# Patient Record
Sex: FEMALE | Race: WHITE | Employment: OTHER | URBAN - METROPOLITAN AREA
[De-identification: names, ages, dates, MRNs, and addresses within clinical notes are randomized per-mention and may not be internally consistent; named-entity substitution may affect disease eponyms.]

---

## 2018-04-12 ENCOUNTER — HOSPITAL ENCOUNTER (EMERGENCY)
Facility: CLINIC | Age: 83
Discharge: HOME | End: 2018-04-12
Attending: EMERGENCY MEDICINE
Payer: COMMERCIAL

## 2018-04-12 VITALS
TEMPERATURE: 97 F | DIASTOLIC BLOOD PRESSURE: 70 MMHG | RESPIRATION RATE: 20 BRPM | HEART RATE: 104 BPM | WEIGHT: 120 LBS | BODY MASS INDEX: 24.19 KG/M2 | SYSTOLIC BLOOD PRESSURE: 110 MMHG | HEIGHT: 59 IN | OXYGEN SATURATION: 95 %

## 2018-04-12 DIAGNOSIS — N76.0 VAGINITIS AND VULVOVAGINITIS: Primary | ICD-10-CM

## 2018-04-12 PROCEDURE — 99282 EMERGENCY DEPT VISIT SF MDM: CPT

## 2018-04-12 RX ORDER — CLOTRIMAZOLE AND BETAMETHASONE DIPROPIONATE 10; .64 MG/G; MG/G
CREAM TOPICAL
Qty: 1 G | Refills: 0 | Status: ON HOLD | OUTPATIENT
Start: 2018-04-12 | End: 2021-12-20

## 2018-04-12 ASSESSMENT — ENCOUNTER SYMPTOMS
EYES NEGATIVE: 1
GASTROINTESTINAL NEGATIVE: 1
RESPIRATORY NEGATIVE: 1

## 2018-04-12 ASSESSMENT — PAIN DESCRIPTION - PAIN TYPE
TYPE: ACUTE PAIN
TYPE: ACUTE PAIN

## 2018-04-12 ASSESSMENT — PAIN DESCRIPTION - FREQUENCY: FREQUENCY: CONTINUOUS

## 2018-04-12 ASSESSMENT — PAIN - FUNCTIONAL ASSESSMENT: PAIN_FUNCTIONAL_ASSESSMENT: 0-10

## 2018-04-12 ASSESSMENT — PAIN DESCRIPTION - DESCRIPTORS
DESCRIPTORS: BURNING;ITCHING
DESCRIPTORS: BURNING

## 2018-04-12 ASSESSMENT — PAIN DESCRIPTION - LOCATION
LOCATION: VAGINA;LABIA
LOCATION: VAGINA;LABIA

## 2018-04-12 ASSESSMENT — PAIN SCALES - GENERAL
PAINLEVEL_OUTOF10: 10
PAINLEVEL_OUTOF10: 10

## 2018-04-16 ENCOUNTER — OFFICE VISIT (OUTPATIENT)
Dept: OBGYN CLINIC | Age: 83
End: 2018-04-16
Payer: COMMERCIAL

## 2018-04-16 VITALS
HEIGHT: 59 IN | SYSTOLIC BLOOD PRESSURE: 116 MMHG | HEART RATE: 88 BPM | DIASTOLIC BLOOD PRESSURE: 72 MMHG | BODY MASS INDEX: 23.59 KG/M2 | WEIGHT: 117 LBS

## 2018-04-16 DIAGNOSIS — N89.8 VAGINAL ITCHING: Primary | ICD-10-CM

## 2018-04-16 PROBLEM — Z90.710 HISTORY OF HYSTERECTOMY: Status: ACTIVE | Noted: 2018-04-16

## 2018-04-16 PROBLEM — Z95.0 CARDIAC RESYNCHRONIZATION THERAPY PACEMAKER (CRT-P) IN PLACE: Status: ACTIVE | Noted: 2018-04-16

## 2018-04-16 PROCEDURE — 99201 PR OFFICE OUTPATIENT NEW 10 MINUTES: CPT | Performed by: OBSTETRICS & GYNECOLOGY

## 2018-04-16 RX ORDER — APIXABAN 2.5 MG/1
2.5 TABLET, FILM COATED ORAL 2 TIMES DAILY
Status: ON HOLD | COMMUNITY
Start: 2018-03-17 | End: 2021-12-20

## 2018-04-16 RX ORDER — SPIRONOLACTONE 25 MG/1
25 TABLET ORAL DAILY
COMMUNITY

## 2019-07-22 ENCOUNTER — HOSPITAL ENCOUNTER (OUTPATIENT)
Age: 84
Setting detail: SPECIMEN
Discharge: HOME | End: 2019-07-22
Payer: COMMERCIAL

## 2019-07-22 LAB
ANION GAP SERPL CALCULATED.3IONS-SCNC: 12 MMOL/L (ref 9–17)
BUN BLDV-MCNC: 14 MG/DL (ref 8–23)
BUN/CREAT BLD: ABNORMAL (ref 9–20)
CALCIUM SERPL-MCNC: 8.5 MG/DL (ref 8.6–10.4)
CHLORIDE BLD-SCNC: 102 MMOL/L (ref 98–107)
CO2: 31 MMOL/L (ref 20–31)
CREAT SERPL-MCNC: 0.94 MG/DL (ref 0.5–0.9)
GFR AFRICAN AMERICAN: >60 ML/MIN
GFR NON-AFRICAN AMERICAN: 57 ML/MIN
GFR SERPL CREATININE-BSD FRML MDRD: ABNORMAL ML/MIN/{1.73_M2}
GFR SERPL CREATININE-BSD FRML MDRD: ABNORMAL ML/MIN/{1.73_M2}
GLUCOSE BLD-MCNC: 83 MG/DL (ref 70–99)
HCT VFR BLD CALC: 36.1 % (ref 36.3–47.1)
HEMOGLOBIN: 11.1 G/DL (ref 11.9–15.1)
MCH RBC QN AUTO: 30.4 PG (ref 25.2–33.5)
MCHC RBC AUTO-ENTMCNC: 30.7 G/DL (ref 28.4–34.8)
MCV RBC AUTO: 98.9 FL (ref 82.6–102.9)
NRBC AUTOMATED: 0 PER 100 WBC
PDW BLD-RTO: 14.1 % (ref 11.8–14.4)
PLATELET # BLD: 299 K/UL (ref 138–453)
PMV BLD AUTO: 10.4 FL (ref 8.1–13.5)
POTASSIUM SERPL-SCNC: 4.1 MMOL/L (ref 3.7–5.3)
RBC # BLD: 3.65 M/UL (ref 3.95–5.11)
SODIUM BLD-SCNC: 145 MMOL/L (ref 135–144)
WBC # BLD: 8.1 K/UL (ref 3.5–11.3)

## 2019-07-22 PROCEDURE — P9603 ONE-WAY ALLOW PRORATED MILES: HCPCS

## 2019-07-22 PROCEDURE — 80048 BASIC METABOLIC PNL TOTAL CA: CPT

## 2019-07-22 PROCEDURE — 85027 COMPLETE CBC AUTOMATED: CPT

## 2019-07-22 PROCEDURE — 36415 COLL VENOUS BLD VENIPUNCTURE: CPT

## 2019-07-29 ENCOUNTER — HOSPITAL ENCOUNTER (OUTPATIENT)
Age: 84
Setting detail: SPECIMEN
Discharge: HOME | End: 2019-07-29
Payer: COMMERCIAL

## 2019-07-29 LAB
ANION GAP SERPL CALCULATED.3IONS-SCNC: 11 MMOL/L (ref 9–17)
BUN BLDV-MCNC: 23 MG/DL (ref 8–23)
BUN/CREAT BLD: ABNORMAL (ref 9–20)
CALCIUM SERPL-MCNC: 8.9 MG/DL (ref 8.6–10.4)
CHLORIDE BLD-SCNC: 103 MMOL/L (ref 98–107)
CO2: 31 MMOL/L (ref 20–31)
CREAT SERPL-MCNC: 1.09 MG/DL (ref 0.5–0.9)
GFR AFRICAN AMERICAN: 58 ML/MIN
GFR NON-AFRICAN AMERICAN: 48 ML/MIN
GFR SERPL CREATININE-BSD FRML MDRD: ABNORMAL ML/MIN/{1.73_M2}
GFR SERPL CREATININE-BSD FRML MDRD: ABNORMAL ML/MIN/{1.73_M2}
GLUCOSE BLD-MCNC: 86 MG/DL (ref 70–99)
HCT VFR BLD CALC: 35 % (ref 36.3–47.1)
HEMOGLOBIN: 10.8 G/DL (ref 11.9–15.1)
MCH RBC QN AUTO: 30.7 PG (ref 25.2–33.5)
MCHC RBC AUTO-ENTMCNC: 30.9 G/DL (ref 28.4–34.8)
MCV RBC AUTO: 99.4 FL (ref 82.6–102.9)
NRBC AUTOMATED: 0 PER 100 WBC
PDW BLD-RTO: 14.1 % (ref 11.8–14.4)
PLATELET # BLD: 378 K/UL (ref 138–453)
PMV BLD AUTO: 10.1 FL (ref 8.1–13.5)
POTASSIUM SERPL-SCNC: 5.2 MMOL/L (ref 3.7–5.3)
RBC # BLD: 3.52 M/UL (ref 3.95–5.11)
SODIUM BLD-SCNC: 145 MMOL/L (ref 135–144)
WBC # BLD: 8.9 K/UL (ref 3.5–11.3)

## 2019-07-29 PROCEDURE — 36415 COLL VENOUS BLD VENIPUNCTURE: CPT

## 2019-07-29 PROCEDURE — P9603 ONE-WAY ALLOW PRORATED MILES: HCPCS

## 2019-07-29 PROCEDURE — 85027 COMPLETE CBC AUTOMATED: CPT

## 2019-07-29 PROCEDURE — 80048 BASIC METABOLIC PNL TOTAL CA: CPT

## 2019-08-05 ENCOUNTER — HOSPITAL ENCOUNTER (OUTPATIENT)
Age: 84
Setting detail: SPECIMEN
Discharge: HOME | End: 2019-08-05
Payer: COMMERCIAL

## 2019-08-05 LAB
ANION GAP SERPL CALCULATED.3IONS-SCNC: 14 MMOL/L (ref 9–17)
BUN BLDV-MCNC: 27 MG/DL (ref 8–23)
BUN/CREAT BLD: ABNORMAL (ref 9–20)
CALCIUM SERPL-MCNC: 9 MG/DL (ref 8.6–10.4)
CHLORIDE BLD-SCNC: 101 MMOL/L (ref 98–107)
CO2: 28 MMOL/L (ref 20–31)
CREAT SERPL-MCNC: 1.03 MG/DL (ref 0.5–0.9)
GFR AFRICAN AMERICAN: >60 ML/MIN
GFR NON-AFRICAN AMERICAN: 51 ML/MIN
GFR SERPL CREATININE-BSD FRML MDRD: ABNORMAL ML/MIN/{1.73_M2}
GFR SERPL CREATININE-BSD FRML MDRD: ABNORMAL ML/MIN/{1.73_M2}
GLUCOSE BLD-MCNC: 70 MG/DL (ref 70–99)
HCT VFR BLD CALC: 39.3 % (ref 36.3–47.1)
HEMOGLOBIN: 11.9 G/DL (ref 11.9–15.1)
MCH RBC QN AUTO: 30 PG (ref 25.2–33.5)
MCHC RBC AUTO-ENTMCNC: 30.3 G/DL (ref 28.4–34.8)
MCV RBC AUTO: 99 FL (ref 82.6–102.9)
NRBC AUTOMATED: 0 PER 100 WBC
PDW BLD-RTO: 13.2 % (ref 11.8–14.4)
PLATELET # BLD: 482 K/UL (ref 138–453)
PMV BLD AUTO: 10.4 FL (ref 8.1–13.5)
POTASSIUM SERPL-SCNC: 3.9 MMOL/L (ref 3.7–5.3)
RBC # BLD: 3.97 M/UL (ref 3.95–5.11)
SODIUM BLD-SCNC: 143 MMOL/L (ref 135–144)
WBC # BLD: 7.9 K/UL (ref 3.5–11.3)

## 2019-08-05 PROCEDURE — P9603 ONE-WAY ALLOW PRORATED MILES: HCPCS

## 2019-08-05 PROCEDURE — 36415 COLL VENOUS BLD VENIPUNCTURE: CPT

## 2019-08-05 PROCEDURE — 80048 BASIC METABOLIC PNL TOTAL CA: CPT

## 2019-08-05 PROCEDURE — 85027 COMPLETE CBC AUTOMATED: CPT

## 2019-08-07 ENCOUNTER — HOSPITAL ENCOUNTER (OUTPATIENT)
Age: 84
Setting detail: SPECIMEN
Discharge: HOME | End: 2019-08-07
Payer: COMMERCIAL

## 2019-08-07 PROCEDURE — 87493 C DIFF AMPLIFIED PROBE: CPT

## 2019-08-08 LAB
C DIFFICILE TOXINS, PCR: NORMAL
SPECIMEN DESCRIPTION: NORMAL

## 2019-08-20 ENCOUNTER — APPOINTMENT (OUTPATIENT)
Dept: GENERAL RADIOLOGY | Facility: CLINIC | Age: 84
End: 2019-08-20
Payer: COMMERCIAL

## 2019-08-20 ENCOUNTER — HOSPITAL ENCOUNTER (EMERGENCY)
Facility: CLINIC | Age: 84
Discharge: HOME | End: 2019-08-20
Attending: EMERGENCY MEDICINE
Payer: COMMERCIAL

## 2019-08-20 VITALS
WEIGHT: 116 LBS | BODY MASS INDEX: 19.81 KG/M2 | RESPIRATION RATE: 15 BRPM | DIASTOLIC BLOOD PRESSURE: 51 MMHG | TEMPERATURE: 97.7 F | SYSTOLIC BLOOD PRESSURE: 95 MMHG | OXYGEN SATURATION: 94 % | HEIGHT: 64 IN | HEART RATE: 75 BPM

## 2019-08-20 DIAGNOSIS — M25.551 RIGHT HIP PAIN: Primary | ICD-10-CM

## 2019-08-20 PROCEDURE — 73552 X-RAY EXAM OF FEMUR 2/>: CPT

## 2019-08-20 PROCEDURE — 73502 X-RAY EXAM HIP UNI 2-3 VIEWS: CPT

## 2019-08-20 PROCEDURE — 99283 EMERGENCY DEPT VISIT LOW MDM: CPT

## 2019-08-20 RX ORDER — OXYCODONE HYDROCHLORIDE AND ACETAMINOPHEN 5; 325 MG/1; MG/1
1 TABLET ORAL EVERY 8 HOURS PRN
COMMUNITY

## 2019-08-20 RX ORDER — FUROSEMIDE 20 MG/1
20 TABLET ORAL 2 TIMES DAILY
Status: ON HOLD | COMMUNITY
End: 2021-12-20

## 2019-08-20 ASSESSMENT — PAIN DESCRIPTION - PAIN TYPE
TYPE: CHRONIC PAIN
TYPE: CHRONIC PAIN

## 2019-08-20 ASSESSMENT — PAIN DESCRIPTION - LOCATION
LOCATION: HIP;LEG
LOCATION: BACK;PELVIS;HIP

## 2019-08-20 ASSESSMENT — PAIN DESCRIPTION - ORIENTATION
ORIENTATION: RIGHT
ORIENTATION: RIGHT

## 2019-08-20 ASSESSMENT — PAIN DESCRIPTION - DESCRIPTORS
DESCRIPTORS: ACHING
DESCRIPTORS: ACHING;BURNING

## 2019-08-20 ASSESSMENT — PAIN SCALES - GENERAL
PAINLEVEL_OUTOF10: 9
PAINLEVEL_OUTOF10: 10

## 2019-08-20 ASSESSMENT — PAIN DESCRIPTION - FREQUENCY: FREQUENCY: CONTINUOUS

## 2019-08-20 NOTE — ED TRIAGE NOTES
PT fell 1 month ago , pt was seen at [facility] pt states they did xray and ultrasound . Pt states it still hurts and she would like an x ray. Pt c/o right hip pain and right leg pain .

## 2019-08-21 NOTE — ED PROVIDER NOTES
likely posttraumatic in etiology. There is mild osteoarthritis at the right knee joint. There is a stable sclerotic focus within the distal aspect of the right femoral metaphysis, most likely an enchondroma or bone infarct. No soft tissue abnormality or radiopaque foreign body is evident. 1. No acute radiographic abnormality of the right hip or osseous pelvis. However, if there is strong suspicion for an acute right hip fracture, consider further characterization with a follow-up right hip MRI. 2. No acute abnormality of the right femur. There is stable periosteal thickening involving the midshaft of the right femur, which likely reflects a chronic posttraumatic deformity. There is a stable sclerotic focus within the distal right femoral metaphysis, likely a benign enchondroma or bone infarct. Xr Hip 2-3 Vw W Pelvis Right    Result Date: 8/20/2019  EXAMINATION: ONE XRAY VIEW OF THE PELVIS AND TWO XRAY VIEWS RIGHT HIP; 4 XRAY VIEWS OF THE RIGHT FEMUR 8/20/2019 7:10 pm COMPARISON: 02/09/2008 HISTORY: ORDERING SYSTEM PROVIDED HISTORY: pain TECHNOLOGIST PROVIDED HISTORY: pain Reason for Exam: Pt. States she fell 1 month ago and c/o of right hip pain since fall. Acuity: Acute Type of Exam: Initial; ORDERING SYSTEM PROVIDED HISTORY: right TECHNOLOGIST PROVIDED HISTORY: right Reason for Exam: Pt. States she fell 1 month ago and c/o of right hip pain since fall. Acuity: Acute Type of Exam: Initial FINDINGS: There is a single frontal view of the pelvis as well as frontal and frog-leg lateral views of the right hip. There is no radiographic evidence of an acute fracture or dislocation of the right hip or osseous pelvis. There is mild symmetric bilateral hip osteoarthritis. The pelvic ring appears intact. Sacrum and SI joints are unremarkable. No destructive osseous lesion is seen. Enthesopathic changes are evident. Scattered phleboliths overlie the pelvis. Four views of the right femur were performed.
Minutes per session: Not on file    Stress: Not on file   Relationships    Social connections:     Talks on phone: Not on file     Gets together: Not on file     Attends [employer] service: Not on file     Active member of club or organization: Not on file     Attends meetings of clubs or organizations: Not on file     Relationship status: Not on file    Intimate partner violence:     Fear of current or ex partner: Not on file     Emotionally abused: Not on file     Physically abused: Not on file     Forced sexual activity: Not on file   Other Topics Concern    Not on file   Social History Narrative    Not on file       SCREENINGS             PHYSICAL EXAM    (up to 7 for level 4, 8 or more for level 5)     ED Triage Vitals   BP Temp Temp Source Pulse Resp SpO2 Height Weight   08/20/19 1857 08/20/19 1857 08/20/19 1857 08/20/19 1857 08/20/19 1857 08/20/19 1901 08/20/19 1857 08/20/19 1857   (!) 95/51 97.7 °F (36.5 °C) Oral 75 15 94 % 5' 4\" (1.626 m) 116 lb (52.6 kg)       Physical Exam   Constitutional: She is oriented to person, place, and time. She appears well-developed and well-nourished. HENT:   Mouth/Throat: Oropharynx is clear and moist.   Eyes: Conjunctivae are normal. Right eye exhibits no discharge. Left eye exhibits no discharge. Neck: No tracheal deviation present. Cardiovascular: Normal rate, regular rhythm, normal heart sounds and intact distal pulses. No murmur heard. Pulmonary/Chest: Effort normal and breath sounds normal. No respiratory distress. She has no wheezes. Abdominal: Soft. She exhibits no distension. There is no tenderness. Musculoskeletal: Normal range of motion. She exhibits no edema, tenderness or deformity. There are no range of motion deficits in the lower extremities, there is no point tenderness over the right hip or greater trochanter, no tenderness at the right knee or right ankle. There is no leg length discrepancies or edema of the lower extremities.   There

## 2019-09-16 ENCOUNTER — HOSPITAL ENCOUNTER (EMERGENCY)
Facility: CLINIC | Age: 84
Discharge: HOME | End: 2019-09-16
Attending: EMERGENCY MEDICINE
Payer: COMMERCIAL

## 2019-09-16 VITALS
DIASTOLIC BLOOD PRESSURE: 68 MMHG | HEART RATE: 86 BPM | SYSTOLIC BLOOD PRESSURE: 118 MMHG | WEIGHT: 118 LBS | RESPIRATION RATE: 16 BRPM | HEIGHT: 59 IN | TEMPERATURE: 97.9 F | BODY MASS INDEX: 23.79 KG/M2 | OXYGEN SATURATION: 94 %

## 2019-09-16 DIAGNOSIS — B02.9 HERPES ZOSTER WITHOUT COMPLICATION: Primary | ICD-10-CM

## 2019-09-16 PROCEDURE — 99282 EMERGENCY DEPT VISIT SF MDM: CPT

## 2019-09-16 PROCEDURE — 6370000000 HC RX 637 (ALT 250 FOR IP): Performed by: EMERGENCY MEDICINE

## 2019-09-16 RX ORDER — ACYCLOVIR 800 MG/1
800 TABLET ORAL
Qty: 35 TABLET | Refills: 0 | Status: SHIPPED | OUTPATIENT
Start: 2019-09-16 | End: 2019-09-23

## 2019-09-16 RX ORDER — METOPROLOL SUCCINATE 25 MG/1
25 TABLET, EXTENDED RELEASE ORAL DAILY
Status: ON HOLD | COMMUNITY
End: 2021-12-20

## 2019-09-16 RX ORDER — HYDROCODONE BITARTRATE AND ACETAMINOPHEN 5; 325 MG/1; MG/1
1 TABLET ORAL ONCE
Status: COMPLETED | OUTPATIENT
Start: 2019-09-16 | End: 2019-09-16

## 2019-09-16 RX ORDER — GABAPENTIN 300 MG/1
300 CAPSULE ORAL 3 TIMES DAILY
Qty: 30 CAPSULE | Refills: 0 | Status: ON HOLD | OUTPATIENT
Start: 2019-09-16 | End: 2021-10-04

## 2019-09-16 RX ADMIN — HYDROCODONE BITARTRATE AND ACETAMINOPHEN 1 TABLET: 5; 325 TABLET ORAL at 10:37

## 2019-09-16 ASSESSMENT — PAIN DESCRIPTION - ORIENTATION: ORIENTATION: RIGHT

## 2019-09-16 ASSESSMENT — PAIN DESCRIPTION - PAIN TYPE: TYPE: ACUTE PAIN

## 2019-09-16 ASSESSMENT — PAIN SCALES - GENERAL
PAINLEVEL_OUTOF10: 10
PAINLEVEL_OUTOF10: 10

## 2019-09-16 ASSESSMENT — PAIN DESCRIPTION - LOCATION: LOCATION: RIB CAGE

## 2021-10-03 ENCOUNTER — HOSPITAL ENCOUNTER (OUTPATIENT)
Age: 86
Setting detail: OBSERVATION
Discharge: HOME | End: 2021-10-05
Attending: EMERGENCY MEDICINE | Admitting: STUDENT IN AN ORGANIZED HEALTH CARE EDUCATION/TRAINING PROGRAM
Payer: COMMERCIAL

## 2021-10-03 ENCOUNTER — APPOINTMENT (OUTPATIENT)
Dept: CT IMAGING | Facility: CLINIC | Age: 86
End: 2021-10-03
Payer: COMMERCIAL

## 2021-10-03 DIAGNOSIS — N81.6 RECTOCELE, FEMALE: ICD-10-CM

## 2021-10-03 DIAGNOSIS — K59.02 CONSTIPATION DUE TO OUTLET DYSFUNCTION: Primary | ICD-10-CM

## 2021-10-03 PROBLEM — K56.41 FECAL IMPACTION (HCC): Status: ACTIVE | Noted: 2021-10-03

## 2021-10-03 LAB
ABSOLUTE EOS #: 0.1 K/UL (ref 0–0.4)
ABSOLUTE IMMATURE GRANULOCYTE: ABNORMAL K/UL (ref 0–0.3)
ABSOLUTE LYMPH #: 1.3 K/UL (ref 1–4.8)
ABSOLUTE MONO #: 0.6 K/UL (ref 0.1–1.2)
ALBUMIN SERPL-MCNC: 4.3 G/DL (ref 3.5–5.2)
ALBUMIN/GLOBULIN RATIO: 1.5 (ref 1–2.5)
ALP BLD-CCNC: 124 U/L (ref 35–104)
ALT SERPL-CCNC: 7 U/L (ref 5–33)
ANION GAP SERPL CALCULATED.3IONS-SCNC: 7 MMOL/L (ref 9–17)
AST SERPL-CCNC: 15 U/L
BASOPHILS # BLD: 0 % (ref 0–2)
BASOPHILS ABSOLUTE: 0 K/UL (ref 0–0.2)
BILIRUB SERPL-MCNC: 0.4 MG/DL (ref 0.3–1.2)
BUN BLDV-MCNC: 28 MG/DL (ref 8–23)
BUN/CREAT BLD: ABNORMAL (ref 9–20)
CALCIUM SERPL-MCNC: 9.4 MG/DL (ref 8.6–10.4)
CHLORIDE BLD-SCNC: 105 MMOL/L (ref 98–107)
CO2: 29 MMOL/L (ref 20–31)
CREAT SERPL-MCNC: 1 MG/DL (ref 0.5–0.9)
DIFFERENTIAL TYPE: ABNORMAL
EOSINOPHILS RELATIVE PERCENT: 1 % (ref 1–4)
GFR AFRICAN AMERICAN: >60 ML/MIN
GFR NON-AFRICAN AMERICAN: 53 ML/MIN
GFR SERPL CREATININE-BSD FRML MDRD: ABNORMAL ML/MIN/{1.73_M2}
GFR SERPL CREATININE-BSD FRML MDRD: ABNORMAL ML/MIN/{1.73_M2}
GLUCOSE BLD-MCNC: 115 MG/DL (ref 70–99)
HCT VFR BLD CALC: 41.8 % (ref 36–46)
HEMOGLOBIN: 14 G/DL (ref 12–16)
IMMATURE GRANULOCYTES: ABNORMAL %
LIPASE: 22 U/L (ref 13–60)
LYMPHOCYTES # BLD: 16 % (ref 24–44)
MCH RBC QN AUTO: 32 PG (ref 26–34)
MCHC RBC AUTO-ENTMCNC: 33.4 G/DL (ref 31–37)
MCV RBC AUTO: 95.8 FL (ref 80–100)
MONOCYTES # BLD: 7 % (ref 2–11)
NRBC AUTOMATED: ABNORMAL PER 100 WBC
PDW BLD-RTO: 13.3 % (ref 12.5–15.4)
PLATELET # BLD: 311 K/UL (ref 140–450)
PLATELET ESTIMATE: ABNORMAL
PMV BLD AUTO: 7.9 FL (ref 6–12)
POTASSIUM SERPL-SCNC: 5 MMOL/L (ref 3.7–5.3)
RBC # BLD: 4.36 M/UL (ref 4–5.2)
RBC # BLD: ABNORMAL 10*6/UL
SARS-COV-2, RAPID: NOT DETECTED
SEG NEUTROPHILS: 76 % (ref 36–66)
SEGMENTED NEUTROPHILS ABSOLUTE COUNT: 6 K/UL (ref 1.8–7.7)
SODIUM BLD-SCNC: 141 MMOL/L (ref 135–144)
SPECIMEN DESCRIPTION: NORMAL
TOTAL PROTEIN: 7.2 G/DL (ref 6.4–8.3)
WBC # BLD: 7.9 K/UL (ref 3.5–11)
WBC # BLD: ABNORMAL 10*3/UL

## 2021-10-03 PROCEDURE — 96374 THER/PROPH/DIAG INJ IV PUSH: CPT

## 2021-10-03 PROCEDURE — 6360000002 HC RX W HCPCS: Performed by: NURSE PRACTITIONER

## 2021-10-03 PROCEDURE — 83690 ASSAY OF LIPASE: CPT

## 2021-10-03 PROCEDURE — 36415 COLL VENOUS BLD VENIPUNCTURE: CPT

## 2021-10-03 PROCEDURE — 6360000002 HC RX W HCPCS: Performed by: EMERGENCY MEDICINE

## 2021-10-03 PROCEDURE — 2580000003 HC RX 258: Performed by: CLINICAL NURSE SPECIALIST

## 2021-10-03 PROCEDURE — 2580000003 HC RX 258: Performed by: EMERGENCY MEDICINE

## 2021-10-03 PROCEDURE — 6360000002 HC RX W HCPCS

## 2021-10-03 PROCEDURE — 93005 ELECTROCARDIOGRAM TRACING: CPT | Performed by: EMERGENCY MEDICINE

## 2021-10-03 PROCEDURE — 87635 SARS-COV-2 COVID-19 AMP PRB: CPT

## 2021-10-03 PROCEDURE — 96375 TX/PRO/DX INJ NEW DRUG ADDON: CPT

## 2021-10-03 PROCEDURE — 99283 EMERGENCY DEPT VISIT LOW MDM: CPT

## 2021-10-03 PROCEDURE — 99219 PR INITIAL OBSERVATION CARE/DAY 50 MINUTES: CPT | Performed by: NURSE PRACTITIONER

## 2021-10-03 PROCEDURE — G0378 HOSPITAL OBSERVATION PER HR: HCPCS

## 2021-10-03 PROCEDURE — 6370000000 HC RX 637 (ALT 250 FOR IP): Performed by: CLINICAL NURSE SPECIALIST

## 2021-10-03 PROCEDURE — 6360000004 HC RX CONTRAST MEDICATION: Performed by: EMERGENCY MEDICINE

## 2021-10-03 PROCEDURE — 85025 COMPLETE CBC W/AUTO DIFF WBC: CPT

## 2021-10-03 PROCEDURE — 80053 COMPREHEN METABOLIC PANEL: CPT

## 2021-10-03 PROCEDURE — 96376 TX/PRO/DX INJ SAME DRUG ADON: CPT

## 2021-10-03 PROCEDURE — 74177 CT ABD & PELVIS W/CONTRAST: CPT

## 2021-10-03 RX ORDER — MAGNESIUM SULFATE 1 G/100ML
1000 INJECTION INTRAVENOUS PRN
Status: DISCONTINUED | OUTPATIENT
Start: 2021-10-03 | End: 2021-10-05 | Stop reason: HOSPADM

## 2021-10-03 RX ORDER — POTASSIUM CHLORIDE 20 MEQ/1
40 TABLET, EXTENDED RELEASE ORAL PRN
Status: DISCONTINUED | OUTPATIENT
Start: 2021-10-03 | End: 2021-10-05 | Stop reason: HOSPADM

## 2021-10-03 RX ORDER — ONDANSETRON 2 MG/ML
4 INJECTION INTRAMUSCULAR; INTRAVENOUS ONCE
Status: COMPLETED | OUTPATIENT
Start: 2021-10-03 | End: 2021-10-03

## 2021-10-03 RX ORDER — SPIRONOLACTONE 25 MG/1
25 TABLET ORAL DAILY
Status: DISCONTINUED | OUTPATIENT
Start: 2021-10-04 | End: 2021-10-05 | Stop reason: HOSPADM

## 2021-10-03 RX ORDER — SODIUM CHLORIDE 9 MG/ML
25 INJECTION, SOLUTION INTRAVENOUS PRN
Status: DISCONTINUED | OUTPATIENT
Start: 2021-10-03 | End: 2021-10-05 | Stop reason: HOSPADM

## 2021-10-03 RX ORDER — NALOXONE HYDROCHLORIDE 1 MG/ML
INJECTION INTRAMUSCULAR; INTRAVENOUS; SUBCUTANEOUS
Status: COMPLETED
Start: 2021-10-03 | End: 2021-10-03

## 2021-10-03 RX ORDER — 0.9 % SODIUM CHLORIDE 0.9 %
70 INTRAVENOUS SOLUTION INTRAVENOUS ONCE
Status: COMPLETED | OUTPATIENT
Start: 2021-10-03 | End: 2021-10-03

## 2021-10-03 RX ORDER — SODIUM PHOSPHATE, DIBASIC AND SODIUM PHOSPHATE, MONOBASIC 7; 19 G/133ML; G/133ML
1 ENEMA RECTAL ONCE
Status: COMPLETED | OUTPATIENT
Start: 2021-10-03 | End: 2021-10-03

## 2021-10-03 RX ORDER — SODIUM CHLORIDE 9 MG/ML
1000 INJECTION, SOLUTION INTRAVENOUS CONTINUOUS
Status: DISCONTINUED | OUTPATIENT
Start: 2021-10-03 | End: 2021-10-04

## 2021-10-03 RX ORDER — ONDANSETRON 4 MG/1
4 TABLET, ORALLY DISINTEGRATING ORAL EVERY 8 HOURS PRN
Status: DISCONTINUED | OUTPATIENT
Start: 2021-10-03 | End: 2021-10-05 | Stop reason: HOSPADM

## 2021-10-03 RX ORDER — GABAPENTIN 300 MG/1
300 CAPSULE ORAL 3 TIMES DAILY
Status: DISCONTINUED | OUTPATIENT
Start: 2021-10-03 | End: 2021-10-04

## 2021-10-03 RX ORDER — SODIUM CHLORIDE 0.9 % (FLUSH) 0.9 %
10 SYRINGE (ML) INJECTION PRN
Status: DISCONTINUED | OUTPATIENT
Start: 2021-10-03 | End: 2021-10-05 | Stop reason: HOSPADM

## 2021-10-03 RX ORDER — ONDANSETRON 2 MG/ML
4 INJECTION INTRAMUSCULAR; INTRAVENOUS EVERY 6 HOURS PRN
Status: DISCONTINUED | OUTPATIENT
Start: 2021-10-03 | End: 2021-10-05 | Stop reason: HOSPADM

## 2021-10-03 RX ORDER — SODIUM CHLORIDE 0.9 % (FLUSH) 0.9 %
10 SYRINGE (ML) INJECTION PRN
Status: DISCONTINUED | OUTPATIENT
Start: 2021-10-03 | End: 2021-10-04 | Stop reason: SDUPTHER

## 2021-10-03 RX ORDER — DIPHENHYDRAMINE HYDROCHLORIDE 50 MG/ML
25 INJECTION INTRAMUSCULAR; INTRAVENOUS
Status: COMPLETED | OUTPATIENT
Start: 2021-10-03 | End: 2021-10-03

## 2021-10-03 RX ORDER — POTASSIUM CHLORIDE 7.45 MG/ML
10 INJECTION INTRAVENOUS PRN
Status: DISCONTINUED | OUTPATIENT
Start: 2021-10-03 | End: 2021-10-05 | Stop reason: HOSPADM

## 2021-10-03 RX ORDER — POLYETHYLENE GLYCOL 3350 17 G/17G
17 POWDER, FOR SOLUTION ORAL 2 TIMES DAILY
Status: DISCONTINUED | OUTPATIENT
Start: 2021-10-03 | End: 2021-10-05 | Stop reason: HOSPADM

## 2021-10-03 RX ORDER — OXYCODONE HYDROCHLORIDE AND ACETAMINOPHEN 5; 325 MG/1; MG/1
2 TABLET ORAL EVERY 4 HOURS PRN
Status: DISCONTINUED | OUTPATIENT
Start: 2021-10-03 | End: 2021-10-05 | Stop reason: HOSPADM

## 2021-10-03 RX ORDER — ACETAMINOPHEN 325 MG/1
650 TABLET ORAL EVERY 6 HOURS PRN
Status: DISCONTINUED | OUTPATIENT
Start: 2021-10-03 | End: 2021-10-05 | Stop reason: HOSPADM

## 2021-10-03 RX ORDER — FUROSEMIDE 20 MG/1
20 TABLET ORAL 2 TIMES DAILY
Status: DISCONTINUED | OUTPATIENT
Start: 2021-10-03 | End: 2021-10-05 | Stop reason: HOSPADM

## 2021-10-03 RX ORDER — SODIUM CHLORIDE 0.9 % (FLUSH) 0.9 %
5-40 SYRINGE (ML) INJECTION EVERY 12 HOURS SCHEDULED
Status: DISCONTINUED | OUTPATIENT
Start: 2021-10-03 | End: 2021-10-05 | Stop reason: HOSPADM

## 2021-10-03 RX ORDER — METOPROLOL SUCCINATE 25 MG/1
25 TABLET, EXTENDED RELEASE ORAL DAILY
Status: DISCONTINUED | OUTPATIENT
Start: 2021-10-04 | End: 2021-10-05 | Stop reason: HOSPADM

## 2021-10-03 RX ORDER — OXYCODONE HYDROCHLORIDE AND ACETAMINOPHEN 5; 325 MG/1; MG/1
1 TABLET ORAL EVERY 4 HOURS PRN
Status: DISCONTINUED | OUTPATIENT
Start: 2021-10-03 | End: 2021-10-05 | Stop reason: HOSPADM

## 2021-10-03 RX ORDER — ACETAMINOPHEN 650 MG/1
650 SUPPOSITORY RECTAL EVERY 6 HOURS PRN
Status: DISCONTINUED | OUTPATIENT
Start: 2021-10-03 | End: 2021-10-05 | Stop reason: HOSPADM

## 2021-10-03 RX ADMIN — DIPHENHYDRAMINE HYDROCHLORIDE 25 MG: 50 INJECTION, SOLUTION INTRAMUSCULAR; INTRAVENOUS at 23:44

## 2021-10-03 RX ADMIN — SODIUM CHLORIDE, PRESERVATIVE FREE 10 ML: 5 INJECTION INTRAVENOUS at 23:47

## 2021-10-03 RX ADMIN — SODIUM CHLORIDE 1000 ML: 9 INJECTION, SOLUTION INTRAVENOUS at 15:59

## 2021-10-03 RX ADMIN — SODIUM CHLORIDE 70 ML: 9 INJECTION, SOLUTION INTRAVENOUS at 16:30

## 2021-10-03 RX ADMIN — SODIUM PHOSPHATE, DIBASIC AND SODIUM PHOSPHATE, MONOBASIC 1 ENEMA: 7; 19 ENEMA RECTAL at 16:10

## 2021-10-03 RX ADMIN — NALOXONE HYDROCHLORIDE: 1 INJECTION PARENTERAL at 18:07

## 2021-10-03 RX ADMIN — ONDANSETRON 4 MG: 2 INJECTION INTRAMUSCULAR; INTRAVENOUS at 15:59

## 2021-10-03 RX ADMIN — HYDROMORPHONE HYDROCHLORIDE 0.5 MG: 1 INJECTION, SOLUTION INTRAMUSCULAR; INTRAVENOUS; SUBCUTANEOUS at 15:59

## 2021-10-03 RX ADMIN — POLYETHYLENE GLYCOL 3350 17 G: 17 POWDER, FOR SOLUTION ORAL at 23:44

## 2021-10-03 RX ADMIN — IOPAMIDOL 75 ML: 755 INJECTION, SOLUTION INTRAVENOUS at 16:30

## 2021-10-03 RX ADMIN — HYDROMORPHONE HYDROCHLORIDE 0.5 MG: 1 INJECTION, SOLUTION INTRAMUSCULAR; INTRAVENOUS; SUBCUTANEOUS at 17:09

## 2021-10-03 RX ADMIN — Medication 10 ML: at 16:31

## 2021-10-03 RX ADMIN — ONDANSETRON 4 MG: 2 INJECTION INTRAMUSCULAR; INTRAVENOUS at 17:10

## 2021-10-03 ASSESSMENT — PAIN DESCRIPTION - LOCATION
LOCATION: ABDOMEN

## 2021-10-03 ASSESSMENT — PAIN SCALES - GENERAL
PAINLEVEL_OUTOF10: 10
PAINLEVEL_OUTOF10: 10
PAINLEVEL_OUTOF10: 0
PAINLEVEL_OUTOF10: 0
PAINLEVEL_OUTOF10: 10
PAINLEVEL_OUTOF10: 0

## 2021-10-03 NOTE — ED NOTES
Writer to bedside and pt was very sleepy and difficult to arouse, with oxygen saturation of 76% after being given 0.5 diaudid x2. Narcan was given via verbal order from Dr Nicolasa Norman.    ot given narcan and pt awoken with ease and saturation went to 96%  Will continue to monitor     Genesis Hart RN  10/03/21 2046

## 2021-10-03 NOTE — ED PROVIDER NOTES
[facility]  [address] [facility]  Phone: [phone]  [place]      Pt Name: Jina Melton  MRN: [id number]  [place] 1935  Date of evaluation: 10/3/2021    CHIEF COMPLAINT       Chief Complaint   Patient presents with    Abdominal Pain       HISTORY OF PRESENT ILLNESS    Jina Melton is a 80 y.o. female who presents with lower abdominal pain. Patient states that she has had 2 weeks worth of lower abdominal pain and constipation she states that she has a history of rectocele diagnosed several years ago. She was told that she should not get constipated all she could have a serious issue and have to have surgery. Ever the patient has not taken any laxatives until a couple of days ago she started with some Metamucil but she states that she has had 2 or 3 large bowel movements. She is unable to determine whether there was blood in the stool as she has poor vision from her macular degeneration. She states that she has felt somewhat nauseous but has not had a lot of vomiting had some upper abdominal pain as well. REVIEW OF SYSTEMS     Constitutional: No fevers or chills   HEENT: No sore throat, rhinorrhea, or earache   Eyes: No blurry vision or double vision no drainage   Cardiovascular: No chest pain or tachycardia   Respiratory: No wheezing or shortness of breath no cough   Gastrointestinal: See above  : No hematuria or dysuria   Musculoskeletal: No swelling or pain   Skin: No rash   Neurological: No focal neurologic complaints, paresthesias, weakness, or headache   PAST MEDICAL HISTORY    has a past medical history of A-fib ([address] Atrial fibrillation ([address] CHF (congestive heart failure) ([address] COPD (chronic obstructive pulmonary disease) ([address] Diverticulosis, H/O blood clots, History of cardioversion, Liver cyst, Macular degeneration, Pneumonia, and Rectocele.     SURGICAL HISTORY      has a past surgical history that includes ablation of dysrhythmic focus; Pacemaker insertion; Femur Surgery (Right); Appendectomy; Hysterectomy; and Cataract removal (Bilateral). CURRENT MEDICATIONS       Previous Medications    CLOTRIMAZOLE-BETAMETHASONE (LOTRISONE) 1-0.05 % CREAM    Apply topically 2 times daily. DILTIAZEM (CARDIZEM CD) 300 MG EXTENDED RELEASE CAPSULE    Take 300 mg by mouth daily    ELIQUIS 2.5 MG TABS TABLET    Take 2.5 mg by mouth 2 times daily     FUROSEMIDE (LASIX) 20 MG TABLET    Take 20 mg by mouth 2 times daily    GABAPENTIN (NEURONTIN) 300 MG CAPSULE    Take 1 capsule by mouth 3 times daily for 10 days. Start with one cap once daily for 3 days, then one cap twice daily for 3 days, then three times daily. thereafter    METOPROLOL SUCCINATE (TOPROL XL) 25 MG EXTENDED RELEASE TABLET    Take 25 mg by mouth daily    OXYCODONE-ACETAMINOPHEN (PERCOCET) 5-325 MG PER TABLET    Take 1 tablet by mouth every 4 hours as needed for Pain. SPIRONOLACTONE (ALDACTONE) 25 MG TABLET    Take 25 mg by mouth       ALLERGIES     is allergic to keflex [cephalexin], demerol hcl [meperidine], and flagyl [metronidazole]. FAMILY HISTORY     has no family status information on file. Family history is unknown by patient. SOCIAL HISTORY      reports that she quit smoking about 14 years ago. Her smoking use included cigarettes. She has never used smokeless tobacco. She reports that she does not drink alcohol and does not use drugs.     PHYSICAL EXAM       ED Triage Vitals [10/03/21 1537]   BP Temp Temp Source Pulse Resp SpO2 Height Weight   (!) 123/55 98.6 °F (37 °C) Oral 72 14 96 % -- 122 lb (55.3 kg)     Constitutional: Alert, oriented x3, nontoxic, answering questions appropriately, acting properly for age, in no acute distress   HEENT: Extraocular muscles intact, mucus membranes moist, TMs clear bilaterally, no posterior pharyngeal erythema or exudates, Pupils equal, round, reactive to light,   Neck: Trachea midline   Cardiovascular: Regular rhythm and rate no murmurs   Respiratory: Clear to auscultation bilaterally no wheezes, rhonchi, rales, no respiratory distress no tachypnea no retractions no hypoxia  Gastrointestinal: Soft, mild lower quadrant tenderness with distention in the lower quadrants. Active bowel sounds are noted. Some guarding noted to palpation  Musculoskeletal: No extremity pain or swelling   Neurologic: Moving all 4 extremities without difficulty there are no gross focal neurologic deficits   Skin: Warm and dry     DIFFERENTIAL DIAGNOSIS/ MDM:     Possible diverticulitis or recurrence of her diet of her rectocele we will get a CAT scan with IV contrast  At 3:50 PM the nurse states that the patient went to the bathroom but is only past passing small amounts of stool or hard nonbloody. The need an enema to help clean her out. At 4:22 PM the patient was not able to hold the enema clear effluent returned with no bowel movement.   DIAGNOSTIC RESULTS     EKG: All EKG's are interpreted by the Emergency Department Physician who either signs or Co-signs this chart in the absence of a cardiologist.        Not indicated unless otherwise documented above    LABS:  Results for orders placed or performed during the hospital encounter of 10/03/21   CBC Auto Differential   Result Value Ref Range    WBC 7.9 3.5 - 11.0 k/uL    RBC 4.36 4.0 - 5.2 m/uL    Hemoglobin 14.0 12.0 - 16.0 g/dL    Hematocrit 41.8 36 - 46 %    MCV 95.8 80 - 100 fL    MCH 32.0 26 - 34 pg    MCHC 33.4 31 - 37 g/dL    RDW 13.3 12.5 - 15.4 %    Platelets 050 056 - 962 k/uL    MPV 7.9 6.0 - 12.0 fL    NRBC Automated NOT REPORTED per 100 WBC    Differential Type NOT REPORTED     Seg Neutrophils 76 (H) 36 - 66 %    Lymphocytes 16 (L) 24 - 44 %    Monocytes 7 2 - 11 %    Eosinophils % 1 1 - 4 %    Basophils 0 0 - 2 %    Immature Granulocytes NOT REPORTED 0 %    Segs Absolute 6.00 1.8 - 7.7 k/uL    Absolute Lymph # 1.30 1.0 - 4.8 k/uL    Absolute Mono # 0.60 0.1 - 1.2 k/uL    Absolute Eos # 0.10 0.0 - 0.4 k/uL    Basophils Absolute 0.00 0.0 - 0.2 k/uL    Absolute Immature Granulocyte NOT REPORTED 0.00 - 0.30 k/uL    WBC Morphology NOT REPORTED     RBC Morphology NOT REPORTED     Platelet Estimate NOT REPORTED    Comprehensive Metabolic Panel w/ Reflex to MG   Result Value Ref Range    Glucose 115 (H) 70 - 99 mg/dL    BUN 28 (H) 8 - 23 mg/dL    CREATININE 1.00 (H) 0.50 - 0.90 mg/dL    Bun/Cre Ratio NOT REPORTED 9 - 20    Calcium 9.4 8.6 - 10.4 mg/dL    Sodium 141 135 - 144 mmol/L    Potassium 5.0 3.7 - 5.3 mmol/L    Chloride 105 98 - 107 mmol/L    CO2 29 20 - 31 mmol/L    Anion Gap 7 (L) 9 - 17 mmol/L    Alkaline Phosphatase 124 (H) 35 - 104 U/L    ALT 7 5 - 33 U/L    AST 15 <32 U/L    Total Bilirubin 0.40 0.3 - 1.2 mg/dL    Total Protein 7.2 6.4 - 8.3 g/dL    Albumin 4.3 3.5 - 5.2 g/dL    Albumin/Globulin Ratio 1.5 1.0 - 2.5    GFR Non-African American 53 (L) >60 mL/min    GFR African American >60 >60 mL/min    GFR Comment          GFR Staging NOT REPORTED    Lipase   Result Value Ref Range    Lipase 22 13 - 60 U/L       Not indicated unless otherwise documented above    RADIOLOGY:   I reviewed the radiologist interpretations:    CT ABDOMEN PELVIS W IV CONTRAST Additional Contrast? None   Final Result   No acute disease noted             Not indicated unless otherwise documented above    EMERGENCY DEPARTMENT COURSE:     The patient was given the following medications:  Orders Placed This Encounter   Medications    0.9 % sodium chloride infusion    HYDROmorphone (DILAUDID) injection 0.5 mg    ondansetron (ZOFRAN) injection 4 mg    fleet rectal enema 1 enema    sodium chloride flush 0.9 % injection 10 mL    0.9 % sodium chloride bolus    iopamidol (ISOVUE-370) 76 % injection 75 mL    HYDROmorphone (DILAUDID) injection 0.5 mg    ondansetron (ZOFRAN) injection 4 mg    naloxone (NARCAN) 2 MG/2ML injection     [place]: cabinet override    naloxone (NARCAN) 2 mg in sodium chloride 0.9 % 500 mL

## 2021-10-04 PROBLEM — F41.1 GAD (GENERALIZED ANXIETY DISORDER): Status: ACTIVE | Noted: 2021-10-04

## 2021-10-04 PROBLEM — K59.00 CONSTIPATION: Status: ACTIVE | Noted: 2021-10-04

## 2021-10-04 PROBLEM — N18.32 STAGE 3B CHRONIC KIDNEY DISEASE (HCC): Status: ACTIVE | Noted: 2021-10-04

## 2021-10-04 LAB
ANION GAP SERPL CALCULATED.3IONS-SCNC: 11 MMOL/L (ref 9–17)
BUN BLDV-MCNC: 22 MG/DL (ref 8–23)
BUN/CREAT BLD: 32 (ref 9–20)
CALCIUM SERPL-MCNC: 7.7 MG/DL (ref 8.6–10.4)
CHLORIDE BLD-SCNC: 110 MMOL/L (ref 98–107)
CO2: 23 MMOL/L (ref 20–31)
CREAT SERPL-MCNC: 0.68 MG/DL (ref 0.5–0.9)
EKG ATRIAL RATE: 97 BPM
EKG Q-T INTERVAL: 462 MS
EKG QRS DURATION: 140 MS
EKG QTC CALCULATION (BAZETT): 532 MS
EKG R AXIS: -49 DEGREES
EKG T AXIS: -4 DEGREES
EKG VENTRICULAR RATE: 80 BPM
GFR AFRICAN AMERICAN: >60 ML/MIN
GFR NON-AFRICAN AMERICAN: >60 ML/MIN
GFR SERPL CREATININE-BSD FRML MDRD: ABNORMAL ML/MIN/{1.73_M2}
GFR SERPL CREATININE-BSD FRML MDRD: ABNORMAL ML/MIN/{1.73_M2}
GLUCOSE BLD-MCNC: 93 MG/DL (ref 70–99)
HCT VFR BLD CALC: 34.1 % (ref 36.3–47.1)
HEMOGLOBIN: 11.2 G/DL (ref 11.9–15.1)
INR BLD: 1.2
MCH RBC QN AUTO: 31.4 PG (ref 25.2–33.5)
MCHC RBC AUTO-ENTMCNC: 32.8 G/DL (ref 28.4–34.8)
MCV RBC AUTO: 95.5 FL (ref 82.6–102.9)
NRBC AUTOMATED: 0 PER 100 WBC
PDW BLD-RTO: 12.5 % (ref 11.8–14.4)
PLATELET # BLD: 258 K/UL (ref 138–453)
PMV BLD AUTO: 9.9 FL (ref 8.1–13.5)
POTASSIUM SERPL-SCNC: 4.1 MMOL/L (ref 3.7–5.3)
PROTHROMBIN TIME: 15.4 SEC (ref 11.5–14.2)
RBC # BLD: 3.57 M/UL (ref 3.95–5.11)
SODIUM BLD-SCNC: 144 MMOL/L (ref 135–144)
WBC # BLD: 8.2 K/UL (ref 3.5–11.3)

## 2021-10-04 PROCEDURE — 94760 N-INVAS EAR/PLS OXIMETRY 1: CPT

## 2021-10-04 PROCEDURE — 85610 PROTHROMBIN TIME: CPT

## 2021-10-04 PROCEDURE — 97116 GAIT TRAINING THERAPY: CPT

## 2021-10-04 PROCEDURE — 2700000000 HC OXYGEN THERAPY PER DAY

## 2021-10-04 PROCEDURE — 97163 PT EVAL HIGH COMPLEX 45 MIN: CPT

## 2021-10-04 PROCEDURE — G0378 HOSPITAL OBSERVATION PER HR: HCPCS

## 2021-10-04 PROCEDURE — 94640 AIRWAY INHALATION TREATMENT: CPT

## 2021-10-04 PROCEDURE — 85027 COMPLETE CBC AUTOMATED: CPT

## 2021-10-04 PROCEDURE — 80048 BASIC METABOLIC PNL TOTAL CA: CPT

## 2021-10-04 PROCEDURE — 2580000003 HC RX 258: Performed by: INTERNAL MEDICINE

## 2021-10-04 PROCEDURE — 6370000000 HC RX 637 (ALT 250 FOR IP): Performed by: CLINICAL NURSE SPECIALIST

## 2021-10-04 PROCEDURE — 36415 COLL VENOUS BLD VENIPUNCTURE: CPT

## 2021-10-04 PROCEDURE — 6370000000 HC RX 637 (ALT 250 FOR IP): Performed by: NURSE PRACTITIONER

## 2021-10-04 PROCEDURE — 97166 OT EVAL MOD COMPLEX 45 MIN: CPT

## 2021-10-04 PROCEDURE — 2580000003 HC RX 258: Performed by: CLINICAL NURSE SPECIALIST

## 2021-10-04 PROCEDURE — 97535 SELF CARE MNGMENT TRAINING: CPT

## 2021-10-04 PROCEDURE — 6360000002 HC RX W HCPCS: Performed by: CLINICAL NURSE SPECIALIST

## 2021-10-04 PROCEDURE — 6360000002 HC RX W HCPCS: Performed by: NURSE PRACTITIONER

## 2021-10-04 PROCEDURE — 99220 PR INITIAL OBSERVATION CARE/DAY 70 MINUTES: CPT | Performed by: INTERNAL MEDICINE

## 2021-10-04 PROCEDURE — 96376 TX/PRO/DX INJ SAME DRUG ADON: CPT

## 2021-10-04 RX ORDER — ALBUTEROL SULFATE 2.5 MG/3ML
2.5 SOLUTION RESPIRATORY (INHALATION)
Status: DISCONTINUED | OUTPATIENT
Start: 2021-10-04 | End: 2021-10-05 | Stop reason: HOSPADM

## 2021-10-04 RX ORDER — GUAIFENESIN 600 MG/1
600 TABLET, EXTENDED RELEASE ORAL 2 TIMES DAILY
Status: DISCONTINUED | OUTPATIENT
Start: 2021-10-04 | End: 2021-10-05 | Stop reason: HOSPADM

## 2021-10-04 RX ORDER — SODIUM CHLORIDE 9 MG/ML
1000 INJECTION, SOLUTION INTRAVENOUS CONTINUOUS
Status: DISCONTINUED | OUTPATIENT
Start: 2021-10-04 | End: 2021-10-05 | Stop reason: HOSPADM

## 2021-10-04 RX ORDER — ALBUTEROL SULFATE 2.5 MG/3ML
2.5 SOLUTION RESPIRATORY (INHALATION) 3 TIMES DAILY
Status: DISCONTINUED | OUTPATIENT
Start: 2021-10-05 | End: 2021-10-05 | Stop reason: HOSPADM

## 2021-10-04 RX ADMIN — SODIUM CHLORIDE 1000 ML: 9 INJECTION, SOLUTION INTRAVENOUS at 11:13

## 2021-10-04 RX ADMIN — POLYETHYLENE GLYCOL 3350 17 G: 17 POWDER, FOR SOLUTION ORAL at 21:25

## 2021-10-04 RX ADMIN — GUAIFENESIN 600 MG: 600 TABLET ORAL at 22:39

## 2021-10-04 RX ADMIN — APIXABAN 2.5 MG: 2.5 TABLET, FILM COATED ORAL at 21:25

## 2021-10-04 RX ADMIN — APIXABAN 2.5 MG: 2.5 TABLET, FILM COATED ORAL at 11:13

## 2021-10-04 RX ADMIN — POLYETHYLENE GLYCOL 3350 17 G: 17 POWDER, FOR SOLUTION ORAL at 11:13

## 2021-10-04 RX ADMIN — ONDANSETRON 4 MG: 2 INJECTION INTRAMUSCULAR; INTRAVENOUS at 08:07

## 2021-10-04 RX ADMIN — SODIUM CHLORIDE 1000 ML: 9 INJECTION, SOLUTION INTRAVENOUS at 07:52

## 2021-10-04 RX ADMIN — ALBUTEROL SULFATE 2.5 MG: 2.5 SOLUTION RESPIRATORY (INHALATION) at 22:59

## 2021-10-04 RX ADMIN — SODIUM CHLORIDE, PRESERVATIVE FREE 10 ML: 5 INJECTION INTRAVENOUS at 07:59

## 2021-10-04 RX ADMIN — SODIUM CHLORIDE 1000 ML: 9 INJECTION, SOLUTION INTRAVENOUS at 16:00

## 2021-10-04 RX ADMIN — POLYETHYLENE GLYCOL 3350 17 G: 17 POWDER, FOR SOLUTION ORAL at 09:49

## 2021-10-04 SDOH — ECONOMIC STABILITY: FOOD INSECURITY: WITHIN THE PAST 12 MONTHS, THE FOOD YOU BOUGHT JUST DIDN'T LAST AND YOU DIDN'T HAVE MONEY TO GET MORE.: NEVER TRUE

## 2021-10-04 SDOH — HEALTH STABILITY: PHYSICAL HEALTH: ON AVERAGE, HOW MANY DAYS PER WEEK DO YOU ENGAGE IN MODERATE TO STRENUOUS EXERCISE (LIKE A BRISK WALK)?: 0 DAYS

## 2021-10-04 SDOH — ECONOMIC STABILITY: TRANSPORTATION INSECURITY
IN THE PAST 12 MONTHS, HAS THE LACK OF TRANSPORTATION KEPT YOU FROM MEDICAL APPOINTMENTS OR FROM GETTING MEDICATIONS?: NO

## 2021-10-04 SDOH — HEALTH STABILITY: PHYSICAL HEALTH: ON AVERAGE, HOW MANY MINUTES DO YOU ENGAGE IN EXERCISE AT THIS LEVEL?: 0 MIN

## 2021-10-04 SDOH — ECONOMIC STABILITY: FOOD INSECURITY: WITHIN THE PAST 12 MONTHS, YOU WORRIED THAT YOUR FOOD WOULD RUN OUT BEFORE YOU GOT MONEY TO BUY MORE.: NEVER TRUE

## 2021-10-04 SDOH — ECONOMIC STABILITY: INCOME INSECURITY: IN THE LAST 12 MONTHS, WAS THERE A TIME WHEN YOU WERE NOT ABLE TO PAY THE MORTGAGE OR RENT ON TIME?: NO

## 2021-10-04 SDOH — ECONOMIC STABILITY: TRANSPORTATION INSECURITY
IN THE PAST 12 MONTHS, HAS LACK OF TRANSPORTATION KEPT YOU FROM MEETINGS, WORK, OR FROM GETTING THINGS NEEDED FOR DAILY LIVING?: NO

## 2021-10-04 SDOH — ECONOMIC STABILITY: HOUSING INSECURITY
IN THE LAST 12 MONTHS, WAS THERE A TIME WHEN YOU DID NOT HAVE A STEADY PLACE TO SLEEP OR SLEPT IN A SHELTER (INCLUDING NOW)?: NO

## 2021-10-04 ASSESSMENT — PATIENT HEALTH QUESTIONNAIRE - PHQ9
1. LITTLE INTEREST OR PLEASURE IN DOING THINGS: NOT AT ALL
SUM OF ALL RESPONSES TO PHQ9 QUESTIONS 1 & 2: 0
2. FEELING DOWN, DEPRESSED OR HOPELESS: NOT AT ALL
SUM OF ALL RESPONSES TO PHQ9 QUESTIONS 1 & 2: 0
1. LITTLE INTEREST OR PLEASURE IN DOING THINGS: NOT AT ALL
SUM OF ALL RESPONSES TO PHQ9 QUESTIONS 1 & 2: 0
DEPRESSION UNABLE TO ASSESS: YES
2. FEELING DOWN, DEPRESSED OR HOPELESS: NOT AT ALL
1. LITTLE INTEREST OR PLEASURE IN DOING THINGS: NOT AT ALL
1. LITTLE INTEREST OR PLEASURE IN DOING THINGS: NOT AT ALL
DEPRESSION UNABLE TO ASSESS: YES
SUM OF ALL RESPONSES TO PHQ9 QUESTIONS 1 & 2: 0

## 2021-10-04 ASSESSMENT — ENCOUNTER SYMPTOMS
COUGH: 0
SHORTNESS OF BREATH: 0
SORE THROAT: 0
NAUSEA: 1
VOMITING: 0
BACK PAIN: 0
CONSTIPATION: 1
COLOR CHANGE: 0
ABDOMINAL PAIN: 1

## 2021-10-04 ASSESSMENT — LIFESTYLE VARIABLES: HOW OFTEN DO YOU HAVE A DRINK CONTAINING ALCOHOL: NEVER

## 2021-10-04 ASSESSMENT — PAIN SCALES - GENERAL
PAINLEVEL_OUTOF10: 0

## 2021-10-04 ASSESSMENT — SOCIAL DETERMINANTS OF HEALTH (SDOH)
HOW OFTEN DO YOU ATTEND CHURCH OR RELIGIOUS SERVICES?: NEVER
WITHIN THE LAST YEAR, HAVE YOU BEEN HUMILIATED OR EMOTIONALLY ABUSED IN OTHER WAYS BY YOUR PARTNER OR EX-PARTNER?: NO
HOW OFTEN DO YOU GET TOGETHER WITH FRIENDS OR RELATIVES?: NEVER
IN A TYPICAL WEEK, HOW MANY TIMES DO YOU TALK ON THE PHONE WITH FAMILY, FRIENDS, OR NEIGHBORS?: NEVER
WITHIN THE LAST YEAR, HAVE YOU BEEN KICKED, HIT, SLAPPED, OR OTHERWISE PHYSICALLY HURT BY YOUR PARTNER OR EX-PARTNER?: NO
WITHIN THE LAST YEAR, HAVE TO BEEN RAPED OR FORCED TO HAVE ANY KIND OF SEXUAL ACTIVITY BY YOUR PARTNER OR EX-PARTNER?: NO
WITHIN THE LAST YEAR, HAVE YOU BEEN AFRAID OF YOUR PARTNER OR EX-PARTNER?: NO
HOW OFTEN DO YOU ATTENT MEETINGS OF THE CLUB OR ORGANIZATION YOU BELONG TO?: NEVER
HOW HARD IS IT FOR YOU TO PAY FOR THE VERY BASICS LIKE FOOD, HOUSING, MEDICAL CARE, AND HEATING?: NOT VERY HARD
DO YOU BELONG TO ANY CLUBS OR ORGANIZATIONS SUCH AS CHURCH GROUPS UNIONS, FRATERNAL OR ATHLETIC GROUPS, OR SCHOOL GROUPS?: YES

## 2021-10-04 ASSESSMENT — PAIN DESCRIPTION - LOCATION
LOCATION: ABDOMEN
LOCATION: ABDOMEN

## 2021-10-04 NOTE — PROGRESS NOTES
Occupational Therapy   Occupational Therapy Initial Assessment  Date: 10/4/2021   Patient Name: Ravi Herandez  MRN: [id number]     : 1935    Date of Service: 10/4/2021    Discharge Recommendations:  Patient would benefit from continued therapy after discharge      Ravi Hernadez is a 80 y.o. female who presents with lower abdominal pain. Patient states that she has had 2 weeks worth of lower abdominal pain and constipation she states that she has a history of rectocele diagnosed several years ago. She was told that she should not get constipated all she could have a serious issue and have to have surgery. Ever the patient has not taken any laxatives until a couple of days ago she started with some Metamucil but she states that she has had 2 or 3 large bowel movements. She is unable to determine whether there was blood in the stool as she has poor vision from her macular degeneration. She states that she has felt somewhat nauseous but has not had a lot of vomiting had some upper abdominal pain as well. RN reports patient is medically stable for therapy treatment this date. Chart reviewed prior to treatment and patient is agreeable for therapy. All lines intact and patient positioned comfortably at end of treatment. All patient needs addressed prior to ending therapy session. Assessment   Performance deficits / Impairments: Decreased functional mobility ; Decreased ADL status; Decreased strength;Decreased safe awareness;Decreased endurance;Decreased balance  Assessment: .Skilled OT is indicated to increase overall ROM, strength, balance, act masha as well as I/safety awareness in function to return home with assist as needed. Prognosis: Good  Decision Making: Medium Complexity  OT Education: OT Role;Plan of Care;Home Exercise Program;Precautions; ADL Adaptive Strategies;Transfer Training;Energy Conservation;Equipment; Family Education  Patient Education: benefits of OOB activity, fall prevention, pursed lip breathing  REQUIRES OT FOLLOW UP: Yes  Activity Tolerance  Activity Tolerance: Patient limited by fatigue;Patient Tolerated treatment well  Safety Devices  Safety Devices in place: Yes  Type of devices: Call light within reach;Nurse notified;Gait belt;Patient at risk for falls; Left in chair;Chair alarm in place           Patient Diagnosis(es): The primary encounter diagnosis was Constipation due to outlet dysfunction. A diagnosis of Rectocele, female was also pertinent to this visit. has a past medical history of A-fib ([facility] [facility] Atrial fibrillation ([facility] CHF (congestive heart failure) ([facility] COPD (chronic obstructive pulmonary disease) ([facility] Diverticulosis, TIMOTEO (generalized anxiety disorder), H/O blood clots, History of cardioversion, Liver cyst, Macular degeneration, Pneumonia, Rectocele, and Stage 3b chronic kidney disease ([facility] [address] has a past surgical history that includes ablation of dysrhythmic focus; Pacemaker insertion; Femur Surgery (Right); Appendectomy; Hysterectomy; and Cataract removal (Bilateral).            Restrictions  Restrictions/Precautions  Restrictions/Precautions: Fall Risk, Up as Tolerated, General Precautions  Position Activity Restriction  Other position/activity restrictions: bed alarm/chair alarm    Subjective   General  Chart Reviewed: Yes  Patient assessed for rehabilitation services?: Yes  Family / Caregiver Present: No  Patient Currently in Pain: Denies  Vital Signs  Patient Currently in Pain: Denies  Social/Functional History  Social/Functional History  Lives With: Alone (daughter comes and goes (appears to be there most of the time))  Type of Home: House  Home Layout: One level  Home Access: Stairs to enter with rails  Entrance Stairs - Number of Steps: 2-3  Entrance Stairs - Rails: Both  Bathroom Shower/Tub: Tub/Shower unit  Bathroom Toilet: Standard  Home Equipment: Cane, Rolling walker, Oxygen (2L O2 at night and prn during day)  ADL Assistance: Independent (dtr. will occasionally stand by while pt. showers)  Homemaking Responsibilities: No (dtr. does cooking and cleaning)  Ambulation Assistance: Independent (furniture walks)  Transfer Assistance: Independent  Active : No  Occupation: Retired  Type of occupation: [employer] work  Leisure & Hobbies: TV  Additional Comments: dtr. gets groceries;  reports no falls       Objective        Orientation  Overall Orientation Status: Within Functional Limits  Observation/Palpation  Posture: Fair  Observation: 4L O2, sats 97% at rest.  Balance  Sitting Balance: Supervision  Standing Balance: Minimal assistance  Standing Balance  Time: ~2-3 min at sink  Functional Mobility  Functional - Mobility Device:  (trialed walker and no AD)  Activity: To/from bathroom  Assist Level: Minimal assistance  Functional Mobility Comments: Pt completed functional mob in room to/from bathroom for toileting and grooming; pt trialed with RW first and pt not following through on safety instructions well to stay within device, herminia with turns and approaching toilet or sink. Pt then trialed without and is somewhat unsteady. Pt is also limited by poor vision in this unfamiliar setting. Pt's O2 during mob dec to 2L and sats 94% following mobility. Pt ed on pacing, breathing techs, and benefits of OOB activity in order to prevent sedentary complications  Toilet Transfers  Toilet - Technique: Ambulating  Equipment Used: Standard toilet  Toilet Transfer: Minimal assistance  Toilet Transfers Comments: cues for safety with transfers  ADL  Feeding: Independent  Grooming: Contact guard assistance (to stand at sink following toileting)  UE Bathing: Minimal assistance  LE Bathing: Minimal assistance  UE Dressing: Minimal assistance  LE Dressing: Minimal assistance  Toileting: Minimal assistance  Additional Comments: pt is limited by dec. activity tolerance, dec.  safety awareness, and dec. balance at times  Tone RUE  RUE Tone: Normotonic  Tone LUE  LUE Tone: Normotonic  Coordination  Movements Are Fluid And Coordinated: Yes     Bed mobility  Supine to Sit: Contact guard assistance  Comment: up to chair  Transfers  Sit to stand: Contact guard assistance;Minimal assistance  Stand to sit: Minimal assistance;Contact guard assistance     Cognition  Overall Cognitive Status: Exceptions  Safety Judgement: Decreased awareness of need for safety;Decreased awareness of need for assistance  Problem Solving: Assistance required to generate solutions;Assistance required to implement solutions;Decreased awareness of errors;Assistance required to correct errors made  Insights: Decreased awareness of deficits  Initiation: Requires cues for some  Perception  Overall Perceptual Status: WFL     Sensation  Overall Sensation Status: WFL        LUE AROM (degrees)  LUE AROM : WFL  RUE AROM (degrees)  RUE AROM : WFL  LUE Strength  Gross LUE Strength: WFL  LUE Strength Comment: ISABEL UE's 4-/5  RUE Strength  Gross RUE Strength: WFL                   Plan   Plan  Times per week: 4-5x/week, 1-2x/day  Current Treatment Recommendations: Strengthening, Balance Training, Functional Mobility Training, Endurance Training, Safety Education & Training, Self-Care / ADL, Patient/Caregiver Education & Training, Equipment Evaluation, Education, & procurement, Positioning          -PAC Inpatient Daily Activity Raw Score: 19 (10/04/21 [facility])  AM-PAC Inpatient ADL T-Scale Score : 40.22 (10/04/21 [facility])  ADL Inpatient CMS 0-100% Score: 42.8 (10/04/21 [facility])  ADL Inpatient CMS G-Code Modifier : CK (10/04/21 [facility])    Goals  Short term goals  Time Frame for Short term goals: by discharge, pt will  Short term goal 1: demo SBA with ADL transfer with good safety and AD/DME as needed  Short term goal 2: demo SBA with functional mob in room with good safety/pacing for ADL completion, AD as approp  Short term goal 3: demo SBA with toileting routine with good safety  Short term goal 4: demo I with UB ADLs and SBA with LB ADLs with DME as needed and good safety/pacing  Short term goal 5: demo and verb good understanding of fall prevention techs, EC/WS techs, equip needs, and B UE HEP  Patient Goals   Patient goals : to go home       Therapy Time   Individual Concurrent Group Co-treatment   Time In 0857         Time Out 0944         Minutes 47           treatment min: [address] [place], [place]

## 2021-10-04 NOTE — PROGRESS NOTES
HPI:    Patient ID: George Villalobos is a 45year old female. HPI    Patient presented for follow up for seizures. She is doing fine and denies any breakthrough seizure activity. No further eye twitching or buzzing sensation in the ears.  Still [facility]  Office: [address], DO, Odalys Doty, DO, Janie Faith, DO, Jessica Travis Blood, DO, Nasreen Dimas MD, Portia Guzman MD, Richar Correa MD, Ronnie Leigh MD, Chirag Betancourt MD, Jose Fan MD, Chaz Friedman MD, Giselle Nichols, DO, Torie Mcnally, DO, Jaleel Kilgore MD,  Raad Bowman DO, Jen Sarabia MD, Sesar Dye MD, Brenden Palmer MD, Dony Pugh MD, , Patsy Ramirez MD, Jessica Graham MD, Josie Fernandez MD, Deshawn Tay, Spike Sweet, CNP, Thai Paniagua, CNP, Gabriela Hayes, CNS, Jeannine Pa, CNP, Sophia Humphrey, CNP, Opal Bolaños, CNP, Efrem Mcgraw, CNP, Andry Simmons, CNP, Tammy Rodrigues PADionnaC, Rosie Nielson, [facility], Arcelia Horn, CNP, Ramona Parham, CNP, Wil Cheung, CNP, Keegan De La Rosa, CNP, Mana Fajardo, CNP, Stephan Dwyer, CNP, Dm Fournier, CNP, Temo [facility]    Progress Note    10/4/2021    11:08 AM    Name:   Rita Serrano  MRN:     [id number]     Acct:      [de-identified]   Room:   2025/2025-01   Day:  0  Admit Date:  10/3/2021  3:36 PM    PCP:   Welton Burkitt, MD  Code Status:  DNR-CCA    Subjective:     C/C:   Chief Complaint   Patient presents with    Abdominal Pain     Interval History Status: not changed. Patient was seen and evaluated at bedside this morning. Patient reports feeling about the same. Still complains of some abdominal pain. Brief History:     70-year-old female with history of rectocele and constipation admitted with abdominal pain.     Review of Systems:     Constitutional:  negative for chills, fevers, sweats  Respiratory:  negative for cough, dyspnea on exertion, shortness of breath, wheezing  Cardiovascular:  negative for chest pain, chest pressure/discomfort, lower extremity edema, palpitations  Gastrointestinal:  negative for abdominal pain, constipation, diarrhea, nausea, vomiting  Neurological:  negative for dizziness, Types: Cigarettes  Smokeless tobacco: Never Used                      Comment: quit 7/2016  Alcohol use: Yes           1.2 oz/week     Standard drinks or equivalent: 2 per week     Comment: occasional           Review of Systems   Constitutional: Negative. headache    Medications: Allergies: Allergies   Allergen Reactions    Gabapentin Other (See Comments)     Other reaction(s): hallucinating/jerking    Keflex [Cephalexin] Anaphylaxis, Hives and Swelling    Tegretol [Carbamazepine] Anaphylaxis    Adhesive Tape Rash    Demerol Hcl [Meperidine] Nausea And Vomiting    Flagyl [Metronidazole] Nausea And Vomiting       Current Meds:   Scheduled Meds:    dilTIAZem  300 mg Oral Daily    apixaban  2.5 mg Oral BID    [Held by provider] furosemide  20 mg Oral BID    metoprolol succinate  25 mg Oral Daily    [Held by provider] spironolactone  25 mg Oral Daily    sodium chloride flush  5-40 mL IntraVENous 2 times per day    polyethylene glycol  17 g Oral BID     Continuous Infusions:    sodium chloride 1,000 mL (10/04/21 0752)    sodium chloride       PRN Meds: sodium chloride flush, sodium chloride, potassium chloride **OR** potassium alternative oral replacement **OR** potassium chloride, magnesium sulfate, ondansetron **OR** ondansetron, acetaminophen **OR** acetaminophen, oxyCODONE-acetaminophen **OR** oxyCODONE-acetaminophen    Data:     Past Medical History:   has a past medical history of A-fib ([address] Atrial fibrillation ([address] CHF (congestive heart failure) ([address] COPD (chronic obstructive pulmonary disease) ([address] Diverticulosis, TIMOTEO (generalized anxiety disorder), H/O blood clots, History of cardioversion, Liver cyst, Macular degeneration, Pneumonia, Rectocele, and Stage 3b chronic kidney disease ([address] Social History:   reports that she quit smoking about 14 years ago. Her smoking use included cigarettes. She has never used smokeless tobacco. She reports that she does not drink alcohol and does not use drugs.      Family History:   Family History   Family history unknown: Yes       Vitals:  BP (!) 97/43   Pulse 94   Temp 98.2 °F (36.8 °C) (Oral)   Resp 18   Ht 4' 11\" (1.499 m)   Wt 122 lb (55.3 kg)   SpO2 99%   BMI 24.64 kg/m²   Temp (24hrs), Av.9 °F (36.6 °C), Min:97.7 °F (36.5 °C), Max:98.6 °F (37 °C)    No results for input(s): POCGLU in the last 72 hours. I/O (24Hr):     Intake/Output Summary (Last 24 hours) at 10/4/2021 1108  Last data filed at 10/4/2021 0732  Gross per 24 hour   Intake 350 ml   Output 200 ml   Net 150 ml       Labs:  Hematology:  Recent Labs     10/03/21  1552 10/04/21  0600   WBC 7.9 8.2   RBC 4.36 3.57*   HGB 14.0 11.2*   HCT 41.8 34.1*   MCV 95.8 95.5   MCH 32.0 31.4   MCHC 33.4 32.8   RDW 13.3 12.5    258   MPV 7.9 9.9   INR  --  1.2     Chemistry:  Recent Labs     10/03/21  1552 10/04/21  0600    144   K 5.0 4.1    110*   CO2 29 23   GLUCOSE 115* 93   BUN 28* 22   CREATININE 1.00* 0.68   ANIONGAP 7* 11   LABGLOM 53* >60   GFRAA >60 >60   CALCIUM 9.4 7.7*     Recent Labs     10/03/21  1552   PROT 7.2   LABALBU 4.3   AST 15   ALT 7   ALKPHOS 124*   BILITOT 0.40   LIPASE 22     ABG:No results found for: POCPH, PHART, PH, POCPCO2, HXM9UQM, PCO2, POCPO2, PO2ART, PO2, POCHCO3, NKS9ZLD, HCO3, NBEA, PBEA, BEART, BE, THGBART, THB, QAY2AWE, KUWM4GPN, W9ZHUTMU, O2SAT, FIO2  Lab Results   Component Value Date/Time    SPECIAL NOT REPORTED 10/11/2016 05:46 PM     Lab Results   Component Value Date/Time    CULTURE NO SIGNIFICANT GROWTH 10/11/2016 05:46 PM    CULTURE  10/11/2016 05:46 PM     Performed at [address], [address] [place] [address] [phone]       Radiology:  CT ABDOMEN PELVIS W IV CONTRAST Additional Contrast? None    Result Date: 10/3/2021  No acute disease noted       Physical Examination:        General appearance:  alert, cooperative and no distress  Mental Status:  oriented to person, place and time and normal affect  Lungs:  clear to auscultation bilaterally, normal effort  Heart:  regular rate and rhythm, no murmur  Abdomen:  soft, nontender, nondistended, normal bowel sounds, no masses, hepatomegaly, splenomegaly  Extremities:  no edema, redness, tenderness in the comprehension. Pupils equally round and reactive to light. 3+ brisk bilaterally. EOMs intact. Visual fields are full. Face is symmetrical. Tongue is midline. Uvula and palate elevate symmetrically. Shrug shoulders normally bilaterally.  The rest of the cran calves  Skin:  no gross lesions, rashes, induration    Assessment:        Hospital Problems         Last Modified POA    * (Principal) Rectocele 10/4/2021 Yes    COPD without exacerbation ([facility] 10/4/2021 Yes    CHF (congestive heart failure) ([facility] 10/4/2021 Yes    Fecal impaction ([facility] [facility] 10/3/2021 Yes    Constipation 10/4/2021 Yes    Stage 3b chronic kidney disease ([facility] 10/4/2021 Yes    TIMOTEO (generalized anxiety disorder) 10/4/2021 Yes          Plan:        Continue Eliquis, Cardizem, metoprolol.  -Continue IV fluids    Juancho Rosas MD  10/4/2021  11:08 AM

## 2021-10-04 NOTE — ED NOTES
[facility] contacted for transport, eta 2200. Pt is resting with eyes closed, respirations even and non labored.        Thai Collazo RN  10/03/21 2004

## 2021-10-04 NOTE — ED NOTES
Pt assisted to bedside commode, urinated small amt. Assisted back in bed, socks and warm blankets provided.        Nel Butterfield RN  10/03/21 2048

## 2021-10-04 NOTE — CARE COORDINATION
Case Management Initial Discharge Plan  [place]             Met with:patient to discuss discharge plans. Information verified: address, contacts, phone number, , insurance Yes  PCP: Amy Moise MD  Date of last visit:     Insurance Provider: [employer]    Discharge Planning    Living Arrangements:  Children (daughter)  Support Systems:  [id number] has 1 stories  2 stairs to climb to get into front door, 0 stairs to climb to reach second floor  Location of bedroom/bathroom in home  - main floor    Patient able to perform ADL's:Independent    Current Services (outpatient & in home) none  DME equipment: walker, cane, shower chair, nebulizer, O2 2L HS and prn  DME provider: Unsure    Pharmacy: [facility] and [address] Needed:  Home Care    Patient agreeable to home care: Yes  Freedom of choice provided:  yes    Prior SNF/Rehab Placement and Facility: [facility] to SNF/Rehab: No  [place] of choice provided: n/a   Evaluation: n/a    Expected Discharge date:     Patient expects to be discharged to:   home  Follow Up Appointment: Best Day/ Time: Monday AM    Transportation provider: daughter  Transportation arrangements needed for discharge: No    Readmission Risk              Risk of Unplanned Readmission:  0             Does patient have a readmission risk score greater than 14?: No  If yes, follow-up appointment must be made within 7 days of discharge. Goal of Care:       Discharge Plan: Met with patient at bedside. Lives with daughter and is independent. Has walker and cane if needed. Has home O2 and nebulizer for history of COPD. Follows with Dr. Urmila Park for Afib and CHF. Pt is on Eliquis. Admitted for abd pain and constipation. Has history of rectocele and was told she is not a surgical candidate in the past.  Pt is DNRCC-A. Is legally blind from macular degeneration.   Agreeable to [facility] for Asif Owens and referral called to [facility]. KUSHAL initiated. The Plan for Transition of Care is related to the following treatment goals: SN, PT/OT    The Patient  was provided with a choice of provider and agrees   with the discharge plan. [x] Yes [] No    Freedom of choice list was provided with basic dialogue that supports the patient's individualized plan of care/goals, treatment preferences and shares the quality data associated with the providers.  [x] Yes [] No        Electronically signed by Irving Roberts RN on 10/4/21 at 3:24 PM EDT

## 2021-10-04 NOTE — FLOWSHEET NOTE
Patient appears to be sleeping but responds to writer's knock. Patient is approachable but drowsy. Patient reports support from children but her son has COVID-23 and her daughter is coping with her own health issues. Patient states that she is doing OK but requests a prayer. Patient reports jose and a [employer] background. Writer offers a prayer and patient joins in the prayer. Writer prays for healing, peace, and rest along with a prayer for patient's family members. Patient appears to be coping adequately. Spiritual Care will follow as needed.        10/04/21 4840   Encounter Summary   Services provided to: Patient   Referral/Consult From: [facility]   Continue Visiting   (10/4/21)   Complexity of Encounter Moderate   Length of Encounter 15 minutes   Spiritual Assessment Completed Yes   Routine   Type Initial   Assessment Approachable   Intervention Active listening;Explored feelings, thoughts, concerns;Explored coping resources;Nurtured hope;Prayer   Outcome Expressed gratitude

## 2021-10-04 NOTE — PROGRESS NOTES
Physical Therapy    Facility/Department: [facility]  Initial Assessment    NAME: Veronica Ellison  : 1935  MRN: [id number]    Date of Service: 10/4/2021    Discharge Recommendations:    Due to recent hospitalization and medical condition, pt would benefit from additional therapy at time of discharge to ensure safety. Please refer to the AM-PAC score for current functional status. Veronica Ellison is a 80 y.o. female who presents with lower abdominal pain. Patient states that she has had 2 weeks worth of lower abdominal pain and constipation she states that she has a history of rectocele diagnosed several years ago. She was told that she should not get constipated all she could have a serious issue and have to have surgery. Ever the patient has not taken any laxatives until a couple of days ago she started with some Metamucil but she states that she has had 2 or 3 large bowel movements. She is unable to determine whether there was blood in the stool as she has poor vision from her macular degeneration. She states that she has felt somewhat nauseous but has not had a lot of vomiting had some upper abdominal pain as well. Assessment   Body structures, Functions, Activity limitations: Decreased functional mobility ; Decreased safe awareness;Decreased balance;Decreased endurance;Decreased cognition  Assessment: Pt. needs encouragement to participate. She is currently focused on her bowels. Appears to need AD for gait. Will attempt to find approp. AD for discharge.   Specific instructions for Next Treatment: AD trial-  RW vs. st. cane  Prognosis: Excellent  Decision Making: Medium Complexity  PT Education: Goals;PT Role;Plan of Care;General Safety;Transfer Training  Patient Education: walker safety, impt. of OOB/ up to chair  Barriers to Learning: questionable cognition  REQUIRES PT FOLLOW UP: Yes  Activity Tolerance  Activity Tolerance: Patient limited by endurance       Patient Diagnosis(es): The primary encounter diagnosis was Constipation due to outlet dysfunction. A diagnosis of Rectocele, female was also pertinent to this visit. has a past medical history of A-fib ([facility] Atrial fibrillation ([facility] CHF (congestive heart failure) ([facility] COPD (chronic obstructive pulmonary disease) ([facility] Diverticulosis, TIMOTEO (generalized anxiety disorder), H/O blood clots, History of cardioversion, Liver cyst, Macular degeneration, Pneumonia, Rectocele, and Stage 3b chronic kidney disease ([facility] has a past surgical history that includes ablation of dysrhythmic focus; Pacemaker insertion; Femur Surgery (Right); Appendectomy; Hysterectomy; and Cataract removal (Bilateral).     Restrictions  Restrictions/Precautions  Restrictions/Precautions: Fall Risk, Up as Tolerated, General Precautions  Position Activity Restriction  Other position/activity restrictions: bed alarm/chair alarm  Vision/Hearing  Vision: Impaired (macular degeneration- unable to see to read; objects are blurry)  Hearing: Within functional limits     Subjective  General  Chart Reviewed: Yes  Patient assessed for rehabilitation services?: Yes  Additional Pertinent Hx: CHF, COPD, DNRCC-A  Family / Caregiver Present: No  Follows Commands: Within Functional Limits  Pain Screening  Patient Currently in Pain: Denies          Orientation  Orientation  Overall Orientation Status: Within Normal Limits  Social/Functional History  Social/Functional History  Lives With: Alone (daughter comes and goes (appears to be there most of the time))  Type of Home: House  Home Layout: One level  Home Access: Stairs to enter with rails  Entrance Stairs - Number of Steps: 2-3  Entrance Stairs - Rails: Both  Bathroom Shower/Tub: Tub/Shower unit  Bathroom Toilet: Standard  Home Equipment: Cane, Rolling walker, Oxygen (2L O2 at night and prn during day)  ADL Assistance: Independent (dtr. will occasionally stand by while pt. showers)  Homemaking Responsibilities: No (dtr. does cooking and cleaning)  Ambulation Assistance: Independent (furniture walks)  Transfer Assistance: Independent  Active : No  Occupation: Retired  Type of occupation: , [address] work  Leisure & Hobbies: TV  Additional Comments: dtr. gets groceries;  reports no falls  Cognition   Cognition  Overall Cognitive Status: Exceptions  Safety Judgement: Decreased awareness of need for safety;Decreased awareness of need for assistance  Problem Solving: Assistance required to generate solutions;Assistance required to implement solutions;Decreased awareness of errors;Assistance required to correct errors made  Insights: Decreased awareness of deficits  Initiation: Requires cues for some    Objective     Observation/Palpation  Posture: Fair  Observation: 4L O2, sats 97% at rest.    AROM RLE (degrees)  RLE AROM: WFL  AROM LLE (degrees)  LLE AROM : WFL  Strength RLE  Strength RLE: WFL  Strength LLE  Strength LLE: WFL  Strength Other  Other: * See OT eval for UE ROM/MMT      Tone RLE  RLE Tone: Normotonic  Tone LLE  LLE Tone: Normotonic  Sensation  Overall Sensation Status: WFL  Bed mobility  Supine to Sit: Contact guard assistance  Comment: up to chair with chair alarm  Transfers  Sit to Stand: Contact guard assistance  Stand to sit: Contact guard assistance  Ambulation  Ambulation?: Yes  Ambulation 1  Device: No Device;Rolling Walker  Assistance: Contact guard assistance;Minimal assistance  Quality of Gait: Had pt. amb. with/without RW.   RW appeared to be in pt's way and she was not following commands to use properly. Took RW from pt. and had her walk with no device. Pt. was CG>min. with decreased step length and trunk rotation. Amb. pt  on 2L O2 with SpO2 94% after gait. (Pt. used 2L O2 at home. Reported to RN.)  Distance: 100ft.   Comments: up to chair with chair alarm;  needed encouragement to sit in chair     Balance  Posture: Fair  Sitting - Static: Good  Sitting - Dynamic: Good  Standing - Static: Fair;+ (no device)  Exercises  Comments: instruction for LE AROM while in chair. Pt. with good demo of LAQ/ AP and hip AROM. Will need review and encouragement. Plan   Plan  Times per week: 1-2x/day; 5-6days/wk  Specific instructions for Next Treatment: AD trial-  RW vs. st. cane  Current Treatment Recommendations: ROM, Transfer Training, Strengthening, Endurance Training, Balance Training, Gait Training, Functional Mobility Training, Safety Education & Training, Patient/Caregiver Education & Training  Safety Devices  Type of devices: All fall risk precautions in place, Gait belt, Call light within reach, Patient at risk for falls, Nurse notified, Left in chair, Chair alarm in place    G-Code       OutComes Score                                                  AM-PAC Score  AM-PAC Inpatient Mobility Raw Score : 18 (10/04/21 1402)  AM-PAC Inpatient T-Scale Score : 43.63 (10/04/21 1402)  Mobility Inpatient CMS 0-100% Score: 46.58 (10/04/21 1402)  Mobility Inpatient CMS G-Code Modifier : CK (10/04/21 1402)          Goals  Short term goals  Time Frame for Short term goals: 12 visits:  Short term goal 1: Pt. to be indep. with bed mob. Short term goal 2: Pt. to be indep with transfers with approp. AD  Short term goal 3: Pt. to be SBA for gait with approp. AD, 50ft (household distance) with approp. O2.   (pt. with macular degeneration so will need visual assist while here)  Short term goal 4: Pt. to tolerate 25+ min. of PT for ther ex/ gait/ balance training  Short term goal 5: Pt. to safely negotiate 2-3 steps to prepare for entering her home at discharge. Patient Goals   Patient goals : d/c home       Therapy Time   Individual Concurrent Group Co-treatment   Time In 0826         Time Out 0915         Minutes 49           Treatment time:  39min.           Kaushik Wong, PT

## 2021-10-04 NOTE — H&P
[facility]  Office: [address], DO, Bethany Din, DO, Kana Alvaradoon, DO, Geeta Decker Blood, DO, Hebert Echavarria MD, Bruce Nair MD, Italia Cote MD, Josie Gaona MD, Chase Ortiz MD, Rojas Guadarrama MD, Rl Sutton MD, Alonso Jaquez, DO, Vito Doshi, DO, Laurence Sandhu MD,  Amando Cotto, DO, Ayad Li MD, Jessie Grande MD, Nicki Alcantar MD, Gabo Choudhary MD, , Feliciano Ga MD, Eric Majano MD, Shanta Hammonds MD, Gavi Hemphill, [facility], [facility], CNP, Marissa Myrick, CNP, Debra Major, CNS, Elena Carmichael, CNP, Adelia Alpers, CNP, Bradley Modi, CNP, Renata Vargas, CNP, Eleanor Aguilera, CNP, Lamar Bedoya PA-C, Dagmar Hyatt, [facility], Horace Campos, CNP, Gulshan Hooper, CNP, Zbigniew Vivas, CNP, Jacquelyn Hoskins, CNP, Bubba June, CNP, Jazmine Flores, CNP, Bar Hays, CNP, Pura Mcdonough, [facility]    HISTORY AND PHYSICAL EXAMINATION            Date:   10/4/2021  Patient name:  Ramona Ramos  Date of admission:  10/3/2021  3:36 PM  MRN:   [id number]  Account:  [de-identified]  YOB: 1935  PCP:    Nikki Heimlich, MD  Room:   1004/1004-02  Code Status:    DNR-CCA    Chief Complaint:     Chief Complaint   Patient presents with    Abdominal Pain       History Obtained From:     patient, electronic medical record    History of Present Illness:     Ramona Ramos is a 80 y.o. Non- / non  female who presents with Abdominal Pain   and is admitted to the hospital for the management of Rectocele. 66-year-old female transferred here from [place] emergency room for rectocele and constipation. Patient was diagnosed with a rectocele several years ago. She states that the gastroenterologist told her that she is not a surgical candidate but it is important to make sure that she never gets constipated with the situation. Does take Percocet for chronic pain.   Has not taken any laxatives until Thursday and she did have a bowel movement last night but feels as if she is not fully emptied her bowels. She has had diffuse lower abdominal pain which is worse in the right lower quadrant for the past week. She does not currently follow with a GI and does not remember the name of the physician who did her previous scope. She had some mild nausea but no vomiting with his abdominal pain. No back pain, urinary symptoms or fevers. CT abdomen with contrast performed emergency room today did not have any acute disease noted. For incidental findings of 28 mm simple left renal cortical cyst.  Bowel showed increased stool. Fat-containing umbilical hernia    Past Medical History:     Past Medical History:   Diagnosis Date    A-fib [facility])     Atrial fibrillation (HCC)     CHF (congestive heart failure) (HCC)     COPD (chronic obstructive pulmonary disease) (HCC)     Diverticulosis     TIMOTEO (generalized anxiety disorder) 10/4/2021    H/O blood clots     right groin and leg.  History of cardioversion     Liver cyst     Macular degeneration     Pneumonia     Rectocele     Stage 3b chronic kidney disease ([facility] [address] 10/4/2021        Past Surgical History:     Past Surgical History:   Procedure Laterality Date    ABLATION OF DYSRHYTHMIC FOCUS      APPENDECTOMY      CATARACT REMOVAL Bilateral     FEMUR SURGERY Right     francois in femur    HYSTERECTOMY      PACEMAKER INSERTION          Medications Prior to Admission:     Prior to Admission medications    Medication Sig Start Date End Date Taking?  Authorizing Provider   metoprolol succinate (TOPROL XL) 25 MG extended release tablet Take 25 mg by mouth daily   Yes Historical Provider, MD   ELIQUIS 2.5 MG TABS tablet Take 2.5 mg by mouth 2 times daily  3/17/18  Yes Historical Provider, MD   diltiazem (CARDIZEM CD) 300 MG extended release capsule Take 300 mg by mouth daily   Yes Historical Provider, MD   gabapentin (NEURONTIN) 300 MG capsule Take 1 capsule by mouth 3 times daily for 10 days. Start with one cap once daily for 3 days, then one cap twice daily for 3 days, then three times daily. thereafter 9/16/19 9/26/19  Iker Moore MD   furosemide (LASIX) 20 MG tablet Take 20 mg by mouth 2 times daily    Historical Provider, MD   oxyCODONE-acetaminophen (PERCOCET) 5-325 MG per tablet Take 1 tablet by mouth every 4 hours as needed for Pain. Historical Provider, MD   spironolactone (ALDACTONE) 25 MG tablet Take 25 mg by mouth    Historical Provider, MD   clotrimazole-betamethasone (LOTRISONE) 1-0.05 % cream Apply topically 2 times daily. 4/12/18   Yaa Villavicencio MD        Allergies:     Keflex [cephalexin], Demerol hcl [meperidine], and Flagyl [metronidazole]    Social History:     Tobacco:    reports that she quit smoking about 14 years ago. Her smoking use included cigarettes. She has never used smokeless tobacco.  Alcohol:      reports no history of alcohol use. Drug Use:  reports no history of drug use. Family History:     Family History   Family history unknown: Yes       Review of Systems:     Positive and Negative as described in HPI. Review of Systems   Constitutional: Negative for activity change and fever. HENT: Negative for congestion and sore throat. Respiratory: Negative for cough and shortness of breath. Cardiovascular: Negative for chest pain and palpitations. Gastrointestinal: Positive for abdominal pain, constipation and nausea. Negative for vomiting. Genitourinary: Negative for dysuria, flank pain, frequency and hematuria. Musculoskeletal: Negative for back pain and myalgias. Skin: Negative for color change and rash. Neurological: Negative for dizziness and headaches. Psychiatric/Behavioral: Negative for confusion and decreased concentration.        Physical Exam:   BP (!) 104/57   Pulse 95   Temp 97.7 °F (36.5 °C)   Resp 20   Ht 4' 11\" (1.499 m)   Wt 122 lb (55.3 kg)   SpO2 92%   BMI 24.64 kg/m²   Temp (24hrs), Av.9 °F (36.6 °C), Min:97.7 °F (36.5 °C), Max:98.6 °F (37 °C)    No results for input(s): POCGLU in the last 72 hours. No intake or output data in the 24 hours ending 10/04/21 0438    Physical Exam  Constitutional:       Appearance: Normal appearance. HENT:      Right Ear: External ear normal.      Left Ear: External ear normal.   Eyes:      General: No scleral icterus. Conjunctiva/sclera: Conjunctivae normal.   Cardiovascular:      Rate and Rhythm: Normal rate and regular rhythm. Pulmonary:      Effort: Pulmonary effort is normal.      Breath sounds: Normal breath sounds. Abdominal:      General: Bowel sounds are normal.      Palpations: Abdomen is soft. Tenderness: There is abdominal tenderness. There is no guarding. Musculoskeletal:         General: No tenderness. Right lower leg: No edema. Left lower leg: No edema. Skin:     General: Skin is warm and dry. Coloration: Skin is not jaundiced. Neurological:      General: No focal deficit present. Mental Status: She is alert and oriented to person, place, and time.          Investigations:      Laboratory Testing:  Recent Results (from the past 24 hour(s))   CBC Auto Differential    Collection Time: 10/03/21  3:52 PM   Result Value Ref Range    WBC 7.9 3.5 - 11.0 k/uL    RBC 4.36 4.0 - 5.2 m/uL    Hemoglobin 14.0 12.0 - 16.0 g/dL    Hematocrit 41.8 36 - 46 %    MCV 95.8 80 - 100 fL    MCH 32.0 26 - 34 pg    MCHC 33.4 31 - 37 g/dL    RDW 13.3 12.5 - 15.4 %    Platelets 121 477 - 940 k/uL    MPV 7.9 6.0 - 12.0 fL    NRBC Automated NOT REPORTED per 100 WBC    Differential Type NOT REPORTED     Seg Neutrophils 76 (H) 36 - 66 %    Lymphocytes 16 (L) 24 - 44 %    Monocytes 7 2 - 11 %    Eosinophils % 1 1 - 4 %    Basophils 0 0 - 2 %    Immature Granulocytes NOT REPORTED 0 %    Segs Absolute 6.00 1.8 - 7.7 k/uL    Absolute Lymph # 1.30 1.0 - 4.8 k/uL    Absolute Mono # 0.60 0.1 - 1.2 k/uL    Absolute Eos # 0.10 0.0 - 0.4 k/uL Basophils Absolute 0.00 0.0 - 0.2 k/uL    Absolute Immature Granulocyte NOT REPORTED 0.00 - 0.30 k/uL    WBC Morphology NOT REPORTED     RBC Morphology NOT REPORTED     Platelet Estimate NOT REPORTED    Comprehensive Metabolic Panel w/ Reflex to MG    Collection Time: 10/03/21  3:52 PM   Result Value Ref Range    Glucose 115 (H) 70 - 99 mg/dL    BUN 28 (H) 8 - 23 mg/dL    CREATININE 1.00 (H) 0.50 - 0.90 mg/dL    Bun/Cre Ratio NOT REPORTED 9 - 20    Calcium 9.4 8.6 - 10.4 mg/dL    Sodium 141 135 - 144 mmol/L    Potassium 5.0 3.7 - 5.3 mmol/L    Chloride 105 98 - 107 mmol/L    CO2 29 20 - 31 mmol/L    Anion Gap 7 (L) 9 - 17 mmol/L    Alkaline Phosphatase 124 (H) 35 - 104 U/L    ALT 7 5 - 33 U/L    AST 15 <32 U/L    Total Bilirubin 0.40 0.3 - 1.2 mg/dL    Total Protein 7.2 6.4 - 8.3 g/dL    Albumin 4.3 3.5 - 5.2 g/dL    Albumin/Globulin Ratio 1.5 1.0 - 2.5    GFR Non-African American 53 (L) >60 mL/min    GFR African American >60 >60 mL/min    GFR Comment          GFR Staging NOT REPORTED    Lipase    Collection Time: 10/03/21  3:52 PM   Result Value Ref Range    Lipase 22 13 - 60 U/L   EKG 12 Lead    Collection Time: 10/03/21  5:45 PM   Result Value Ref Range    Ventricular Rate 80 BPM    Atrial Rate 97 BPM    QRS Duration 140 ms    Q-T Interval 462 ms    QTc Calculation (Bazett) 532 ms    R Axis -49 degrees    T Axis -4 degrees   COVID-19, Rapid    Collection Time: 10/03/21  6:41 PM    Specimen: Nasopharyngeal Swab   Result Value Ref Range    Specimen Description . NASOPHARYNGEAL SWAB     SARS-CoV-2, Rapid Not Detected Not Detected       Imaging/Diagnostics:  CT ABDOMEN PELVIS W IV CONTRAST Additional Contrast? None    Result Date: 10/3/2021  No acute disease noted       Assessment :      Hospital Problems         Last Modified POA    * (Principal) Rectocele 10/4/2021 Yes    COPD without exacerbation ([facility] [address] 10/4/2021 Yes    CHF (congestive heart failure) ([facility] [address] 10/4/2021 Yes    [facility] 10/3/2021 Yes Constipation 10/4/2021 Yes    Stage 3b chronic kidney disease ([facility] [address] 10/4/2021 Yes    TIMOTEO (generalized anxiety disorder) 10/4/2021 Yes          Plan:     Patient status observation in the  Progressive Unit/Step down    1. Observation admission  2. IV hydration  3. Monitor and control pain  4. Soapsuds enema  5. Monitor labs  6. Continue home medications  7. Monitor and control blood pressure  8. Avoid nephrotoxic agents  9. PT/OT eval  10.  GI and DVT prophylaxis      Consultations:   None      DAYAN MORAES - CNP  10/4/2021  4:38 AM    Copy sent to Dr. Avila Brumfield MD

## 2021-10-04 NOTE — FLOWSHEET NOTE
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 Pt iv was pulled up when up to the bedside commode , new iv started, pt refusing a.m. labs, pt demanding all test results now, support provided. Pt daughter called and aware of pt room change to 2025 from 1004 and the need for pt home meds, pt arpit tubbs called at [phone]. Pt daughter stating she has no breaks on her car. Support provided.

## 2021-10-04 NOTE — PLAN OF CARE
Problem: Skin Integrity:  Goal: Will show no infection signs and symptoms  Description: Will show no infection signs and symptoms  10/4/2021 1418 by Oracio Shaffer RN  Outcome: Ongoing  10/4/2021 0614 by Surendra Bernabe RN  Outcome: Ongoing  10/4/2021 0614 by Surendra Bernabe RN  Outcome: Ongoing  Goal: Absence of new skin breakdown  Description: Absence of new skin breakdown  10/4/2021 1418 by Oracio Shaffer RN  Outcome: Ongoing  10/4/2021 0614 by Surendra Bernabe RN  Outcome: Ongoing  10/4/2021 0614 by Surendra Bernabe RN  Outcome: Ongoing     Problem: Falls - Risk of:  Goal: Will remain free from falls  Description: Will remain free from falls  10/4/2021 1418 by Oracio Shaffer RN  Outcome: Ongoing  10/4/2021 0614 by Surendra Bernabe RN  Outcome: Ongoing  10/4/2021 0614 by Surendra Bernabe RN  Outcome: Ongoing  Goal: Absence of physical injury  Description: Absence of physical injury  10/4/2021 1418 by Oracio Shaffer RN  Outcome: Ongoing  10/4/2021 0614 by Surendra Bernabe RN  Outcome: Ongoing  10/4/2021 0614 by Surendra Bernabe RN  Outcome: Ongoing     Problem: Breathing Pattern - Ineffective:  Goal: Ability to achieve and maintain a regular respiratory rate will improve  Description: Ability to achieve and maintain a regular respiratory rate will improve  Outcome: Ongoing

## 2021-10-05 VITALS
OXYGEN SATURATION: 95 % | RESPIRATION RATE: 18 BRPM | SYSTOLIC BLOOD PRESSURE: 122 MMHG | DIASTOLIC BLOOD PRESSURE: 51 MMHG | HEIGHT: 59 IN | WEIGHT: 130.3 LBS | HEART RATE: 74 BPM | BODY MASS INDEX: 26.27 KG/M2 | TEMPERATURE: 98.8 F

## 2021-10-05 PROCEDURE — 97530 THERAPEUTIC ACTIVITIES: CPT

## 2021-10-05 PROCEDURE — 6370000000 HC RX 637 (ALT 250 FOR IP): Performed by: NURSE PRACTITIONER

## 2021-10-05 PROCEDURE — 94640 AIRWAY INHALATION TREATMENT: CPT

## 2021-10-05 PROCEDURE — G0378 HOSPITAL OBSERVATION PER HR: HCPCS

## 2021-10-05 PROCEDURE — 6360000002 HC RX W HCPCS: Performed by: CLINICAL NURSE SPECIALIST

## 2021-10-05 PROCEDURE — 96376 TX/PRO/DX INJ SAME DRUG ADON: CPT

## 2021-10-05 PROCEDURE — 94761 N-INVAS EAR/PLS OXIMETRY MLT: CPT

## 2021-10-05 PROCEDURE — 97116 GAIT TRAINING THERAPY: CPT

## 2021-10-05 PROCEDURE — 6360000002 HC RX W HCPCS: Performed by: NURSE PRACTITIONER

## 2021-10-05 PROCEDURE — 2500000003 HC RX 250 WO HCPCS: Performed by: NURSE PRACTITIONER

## 2021-10-05 PROCEDURE — 96375 TX/PRO/DX INJ NEW DRUG ADDON: CPT

## 2021-10-05 PROCEDURE — 99217 PR OBSERVATION CARE DISCHARGE MANAGEMENT: CPT | Performed by: STUDENT IN AN ORGANIZED HEALTH CARE EDUCATION/TRAINING PROGRAM

## 2021-10-05 PROCEDURE — 6370000000 HC RX 637 (ALT 250 FOR IP): Performed by: CLINICAL NURSE SPECIALIST

## 2021-10-05 PROCEDURE — 2700000000 HC OXYGEN THERAPY PER DAY

## 2021-10-05 PROCEDURE — 2580000003 HC RX 258: Performed by: CLINICAL NURSE SPECIALIST

## 2021-10-05 RX ORDER — METOPROLOL TARTRATE 5 MG/5ML
5 INJECTION INTRAVENOUS ONCE
Status: COMPLETED | OUTPATIENT
Start: 2021-10-05 | End: 2021-10-05

## 2021-10-05 RX ORDER — DIPHENHYDRAMINE HYDROCHLORIDE 50 MG/ML
25 INJECTION INTRAMUSCULAR; INTRAVENOUS ONCE
Status: COMPLETED | OUTPATIENT
Start: 2021-10-05 | End: 2021-10-05

## 2021-10-05 RX ORDER — POLYETHYLENE GLYCOL 3350 17 G/17G
17 POWDER, FOR SOLUTION ORAL 2 TIMES DAILY PRN
Qty: 60 EACH | Refills: 0 | Status: SHIPPED | OUTPATIENT
Start: 2021-10-05 | End: 2021-11-04

## 2021-10-05 RX ORDER — DILTIAZEM HYDROCHLORIDE 5 MG/ML
5 INJECTION INTRAVENOUS ONCE
Status: COMPLETED | OUTPATIENT
Start: 2021-10-05 | End: 2021-10-05

## 2021-10-05 RX ADMIN — GUAIFENESIN 600 MG: 600 TABLET ORAL at 08:56

## 2021-10-05 RX ADMIN — DILTIAZEM HYDROCHLORIDE 5 MG: 5 INJECTION INTRAVENOUS at 02:59

## 2021-10-05 RX ADMIN — APIXABAN 2.5 MG: 2.5 TABLET, FILM COATED ORAL at 08:56

## 2021-10-05 RX ADMIN — DILTIAZEM HYDROCHLORIDE 300 MG: 180 CAPSULE, COATED, EXTENDED RELEASE ORAL at 08:56

## 2021-10-05 RX ADMIN — POLYETHYLENE GLYCOL 3350 17 G: 17 POWDER, FOR SOLUTION ORAL at 08:56

## 2021-10-05 RX ADMIN — OXYCODONE AND ACETAMINOPHEN 2 TABLET: 5; 325 TABLET ORAL at 13:16

## 2021-10-05 RX ADMIN — METOPROLOL TARTRATE 5 MG: 5 INJECTION INTRAVENOUS at 01:46

## 2021-10-05 RX ADMIN — METOPROLOL SUCCINATE 25 MG: 25 TABLET, EXTENDED RELEASE ORAL at 08:56

## 2021-10-05 RX ADMIN — SODIUM CHLORIDE, PRESERVATIVE FREE 10 ML: 5 INJECTION INTRAVENOUS at 08:56

## 2021-10-05 RX ADMIN — ONDANSETRON 4 MG: 2 INJECTION INTRAMUSCULAR; INTRAVENOUS at 08:56

## 2021-10-05 RX ADMIN — DIPHENHYDRAMINE HYDROCHLORIDE 25 MG: 50 INJECTION, SOLUTION INTRAMUSCULAR; INTRAVENOUS at 00:20

## 2021-10-05 RX ADMIN — ALBUTEROL SULFATE 2.5 MG: 2.5 SOLUTION RESPIRATORY (INHALATION) at 09:54

## 2021-10-05 RX ADMIN — ALBUTEROL SULFATE 2.5 MG: 2.5 SOLUTION RESPIRATORY (INHALATION) at 14:55

## 2021-10-05 ASSESSMENT — PAIN DESCRIPTION - PROGRESSION: CLINICAL_PROGRESSION: GRADUALLY WORSENING

## 2021-10-05 ASSESSMENT — PAIN SCALES - GENERAL
PAINLEVEL_OUTOF10: 8
PAINLEVEL_OUTOF10: 0
PAINLEVEL_OUTOF10: 4

## 2021-10-05 ASSESSMENT — PAIN DESCRIPTION - ONSET: ONSET: ON-GOING

## 2021-10-05 ASSESSMENT — PAIN - FUNCTIONAL ASSESSMENT: PAIN_FUNCTIONAL_ASSESSMENT: ACTIVITIES ARE NOT PREVENTED

## 2021-10-05 ASSESSMENT — PAIN DESCRIPTION - PAIN TYPE: TYPE: ACUTE PAIN;CHRONIC PAIN

## 2021-10-05 ASSESSMENT — PAIN DESCRIPTION - LOCATION: LOCATION: ABDOMEN

## 2021-10-05 ASSESSMENT — PAIN DESCRIPTION - DESCRIPTORS: DESCRIPTORS: DISCOMFORT

## 2021-10-05 ASSESSMENT — PAIN DESCRIPTION - FREQUENCY: FREQUENCY: INTERMITTENT

## 2021-10-05 NOTE — PLAN OF CARE
Problem: Skin Integrity:  Goal: Will show no infection signs and symptoms  Description: Will show no infection signs and symptoms  10/5/2021 0229 by Zack Reese RN  Outcome: Ongoing     Problem: Skin Integrity:  Goal: Absence of new skin breakdown  Description: Absence of new skin breakdown  10/5/2021 0229 by Zack Reese RN  Outcome: Ongoing     Problem: Falls - Risk of:  Goal: Will remain free from falls  Description: Will remain free from falls  10/5/2021 0229 by Zack Reese RN  Outcome: Ongoing     Problem: Falls - Risk of:  Goal: Absence of physical injury  Description: Absence of physical injury  10/5/2021 0229 by Zack Reese RN  Outcome: Ongoing

## 2021-10-05 NOTE — RT PROTOCOL NOTE
RT Inhaler-Nebulizer Bronchodilator Protocol Note    There is a bronchodilator order in the chart from a provider indicating to follow the RT Bronchodilator Protocol and there is an Initiate RT Inhaler-Nebulizer Bronchodilator Protocol order as well (see protocol at bottom of note). CXR Findings:  No results found. The findings from the last RT Protocol Assessment were as follows:   History Pulmonary Disease: Chronic pulmonary disease  Respiratory Pattern: Dyspnea on exertion or RR 21-25 bpm  Breath Sounds: Slightly diminished and/or crackles  Cough: Strong, productive  Indication for Bronchodilator Therapy: On home bronchodilators  Bronchodilator Assessment Score: 7    Aerosolized bronchodilator medication orders have been revised according to the RT Inhaler-Nebulizer Bronchodilator Protocol below. Respiratory Therapist to perform RT Therapy Protocol Assessment initially then follow the protocol. Repeat RT Therapy Protocol Assessment PRN for score 0-3 or on second treatment, BID, and PRN for scores above 3. No Indications - adjust the frequency to every 6 hours PRN wheezing or bronchospasm, if no treatments needed after 48 hours then discontinue using Per Protocol order mode. If indication present, adjust the RT bronchodilator orders based on the Bronchodilator Assessment Score as indicated below. Use Inhaler orders unless patient has one or more of the following: on home nebulizer, not able to hold breath for 10 seconds, is not alert and oriented, cannot activate and use MDI correctly, or respiratory rate 25 breaths per minute or more, then use the equivalent nebulizer order(s) with same Frequency and PRN reasons based on the score. If a patient is on this medication at home then do not decrease Frequency below that used at home.     0-3 - enter or revise RT bronchodilator order(s) to equivalent RT Bronchodilator order with Frequency of every 4 hours PRN for wheezing or increased work of breathing using Per Protocol order mode. 4-6 - enter or revise RT Bronchodilator order(s) to two equivalent RT bronchodilator orders with one order with BID Frequency and one order with Frequency of every 4 hours PRN wheezing or increased work of breathing using Per Protocol order mode. 7-10 - enter or revise RT Bronchodilator order(s) to two equivalent RT bronchodilator orders with one order with TID Frequency and one order with Frequency of every 4 hours PRN wheezing or increased work of breathing using Per Protocol order mode. 11-13 - enter or revise RT Bronchodilator order(s) to one equivalent RT bronchodilator order with QID Frequency and an Albuterol order with Frequency of every 4 hours PRN wheezing or increased work of breathing using Per Protocol order mode. Greater than 13 - enter or revise RT Bronchodilator order(s) to one equivalent RT bronchodilator order with every 4 hours Frequency and an Albuterol order with Frequency of every 2 hours PRN wheezing or increased work of breathing using Per Protocol order mode. RT to enter RT Home Evaluation for COPD & MDI Assessment order using Per Protocol order mode.   Pt takes tx at home TID PRN    Electronically signed by Layla Enrique RN on 10/4/2021 at 11:09 PM

## 2021-10-05 NOTE — PLAN OF CARE
Problem: Skin Integrity:  Goal: Will show no infection signs and symptoms  Description: Will show no infection signs and symptoms  10/5/2021 1153 by Jen Mayer RN  Outcome: Ongoing  Note: Skin assessment performed. Patient turns self PRN. Pressure ulcer prevention being practiced. Problem: Falls - Risk of:  Goal: Will remain free from falls  Description: Will remain free from falls  10/5/2021 1153 by Jen Mayer RN  Outcome: Ongoing  Note: Patient is a fall risk during this admission. Fall risk assessment was performed. Patient is absent of falls. Bed is in the lowest position. Wheels on the bed are locked. Call light and bed side table are within reach. Clutter is removed. Patient was educated to call out when needing assistance or wanting to get out of bed. Patient offered toileting assistance during rounding. Hourly rounds have been performed.        Problem: Breathing Pattern - Ineffective:  Goal: Ability to achieve and maintain a regular respiratory rate will improve  Description: Ability to achieve and maintain a regular respiratory rate will improve  10/5/2021 1153 by Jen Mayer RN  Outcome: Ongoing

## 2021-10-05 NOTE — PROGRESS NOTES
Occupational Therapy    DATE: 10/5/2021    NAME: Jose Manuel Bartlett  MRN: [id number]   : 1935    Patient not seen this date for Occupational Therapy due to:      [] Cancel by RN or physician due to:    [] Hemodialysis    [] Critical Lab Value Level     [] Blood transfusion in progress    [] Acute or unstable cardiovascular status   _MAP < 55 or more than >115  _HR < 40 or > 130    [] Acute or unstable pulmonary status   -FiO2 > 60%   _RR < 5 or >40    _O2 sats < 85%    [] Strict Bedrest    [] Off Unit for surgery or procedure    [] Off Unit for testing       [] Pending imaging to R/O fracture    [x] Refusal by Patient Pt edu on benefits of therapy and still refused. Verbalized \"I am leaving at 4 today anyway. \"     [] Other      [] PT being discontinued at this time. Patient independent. No further needs. [] PT being discontinued at this time as the patient has been transferred to hospice care. No further needs.       Joon 9

## 2021-10-05 NOTE — DISCHARGE SUMMARY
[facility]  Office: [address], DO, Falguni Perkins, DO, Jenice Duane, DO, Yoandy [place] [facility], DO, Denise Hand MD, Alejandro Lopez MD, Katelyn Chen MD, Brijesh Davey MD, Nathaly Sauceda MD, Ad White MD, Florinda Carr MD, Estefany Cronin, DO, Marcia Peralta, DO, Maureen Melendez MD,  Ashly Aguilar DO, Vinod Harman MD, Xavi Barraza MD, Rafia Lamb MD, Kumar Roberson MD, , Amairani Ortiz MD, Shruthi Giordano MD, Davie Pollard MD, Mena Sepulveda [facility], [facility], CNP, Srinath Lindo, CNP, Tanda Lesches, CNS, Adrian Gonzalez, CNP, Meenakshi Bolton, CNP, Amanda Parker, CNP, Lei Chen, CNP, Delbert Aase, CNP, Rod Rodriguez PA-C, Faiza Brar, [facility], Holli Joseph, CNP, Nickolas Garland, CNP, Luly Mayfield, CNP, Germaine Price, CNP, Kurtis Riley, CNP, Wesley Jernigan, [facility], [place], [facility], Ирина Coker, [facility]    Discharge Summary     Patient ID: Ravi Hernadez  :  1935   MRN: [id number]     ACCOUNT:  [de-identified]   Patient's PCP: Tonya Bernabe MD  Admit Date: 10/3/2021   Discharge Date: 10/5/2021    Length of Stay: 0  Code Status:  Prior  Admitting Physician: Maureen Melendez MD  Discharge Physician: Maureen Melendez MD     Active Discharge Diagnoses:     Hospital Problem Lists:  Principal Problem:    Rectocele  Active Problems:    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  *      Admission Condition:  stable     Discharged Condition: stable    Hospital Stay:     Hospital Course:  Upper sorbian Font is a 80 y.o. female who was admitted for the management of  Rectocele , presented to ER with Abdominal Pain    80year old female with past medical hsitory of rectocele, afib on xarelto, COPD, CHF presents with abdominal pain found to be constipated improved with PO medications. Patient to be discharged home. Patient discharged with Miralax      Significant therapeutic interventions: see above    Significant Diagnostic Studies:   Labs / Micro:  CBC:   Lab Results   Component Value Date    WBC 8.2 10/04/2021    RBC 3.57 10/04/2021    HGB 11.2 10/04/2021    HCT 34.1 10/04/2021    MCV 95.5 10/04/2021    MCH 31.4 10/04/2021    MCHC 32.8 10/04/2021    RDW 12.5 10/04/2021     10/04/2021     BMP:    Lab Results   Component Value Date    GLUCOSE 93 10/04/2021     10/04/2021    K 4.1 10/04/2021     10/04/2021    CO2 23 10/04/2021    ANIONGAP 11 10/04/2021    BUN 22 10/04/2021    CREATININE 0.68 10/04/2021    BUNCRER 32 10/04/2021    CALCIUM 7.7 10/04/2021    LABGLOM >60 10/04/2021    GFRAA >60 10/04/2021    GFR      10/04/2021    GFR NOT REPORTED 10/04/2021        Radiology:  CT ABDOMEN PELVIS W IV CONTRAST Additional Contrast? None    Result Date: 10/3/2021  No acute disease noted       Consultations:    Consults:     Final Specialist Recommendations/Findings:   None      The patient was seen and examined on day of discharge and this discharge summary is in conjunction with any daily progress note from day of discharge.     Discharge plan:     Disposition: Home    Physician Follow Up:     Saturnino Bustillo MD  [address] [place] [address]  [phone]    Schedule an appointment as soon as possible for a visit in 1 week  call office to schedule a follow up appointment in one week        Requiring Further Evaluation/Follow Up POST HOSPITALIZATION/Incidental Findings: follow up PCP follow up gastroenterology    Diet: advance diet as tolerated    Activity: As tolerated    Instructions to Patient: please taek your medications as prescribed please follow up with your PCP and gastroenterologist    Discharge Medications: Medication List      START taking these medications    polyethylene glycol 17 g packet  Commonly known as: GLYCOLAX  Take 17 g by mouth 2 times daily as needed for Constipation        CONTINUE taking these medications    Cardizem  MG extended release capsule  Generic drug: dilTIAZem     clotrimazole-betamethasone 1-0.05 % cream  Commonly known as: Lotrisone  Apply topically 2 times daily. Eliquis 2.5 MG Tabs tablet  Generic drug: apixaban     furosemide 20 MG tablet  Commonly known as: LASIX     metoprolol succinate 25 MG extended release tablet  Commonly known as: TOPROL XL     oxyCODONE-acetaminophen 5-325 MG per tablet  Commonly known as: PERCOCET     spironolactone 25 MG tablet  Commonly known as: ALDACTONE        STOP taking these medications    gabapentin 300 MG capsule  Commonly known as: Neurontin           Where to Get Your Medications      These medications were sent to [facility], 497  [phone] - f [phone]  [address] [place] [id number]    Phone: [phone]   · polyethylene glycol 17 g packet         No discharge procedures on file. Time Spent on discharge is  20 mins in patient examination, evaluation, counseling as well as medication reconciliation, prescriptions for required medications, discharge plan and follow up. Electronically signed by   Sonido Valentine MD  10/5/2021  6:52 PM      Thank you Dr. Donald Shi MD for the opportunity to be involved in this patient's care.

## 2021-10-05 NOTE — PROGRESS NOTES
Physical Therapy  Facility/Department: [facility]  Daily Treatment Note  NAME: Jean Marie Chin  : 1935  MRN: [id number]    Date of Service: 10/5/2021    Discharge Recommendations:  Continue to assess pending progress        Assessment   Body structures, Functions, Activity limitations: Decreased functional mobility ; Decreased safe awareness;Decreased balance;Decreased endurance;Decreased cognition  Assessment: Pt. needs encouragement to participate. She is currently focused on her bowels. Appears to need AD for gait. Will attempt to find approp. AD for discharge. Activity Tolerance  Activity Tolerance: Patient Tolerated treatment well     Patient Diagnosis(es): The primary encounter diagnosis was Constipation due to outlet dysfunction. A diagnosis of Rectocele, female was also pertinent to this visit. has a past medical history of A-fib ([address] Atrial fibrillation ([address] CHF (congestive heart failure) ([address] COPD (chronic obstructive pulmonary disease) ([address] Diverticulosis, TIMOTEO (generalized anxiety disorder), H/O blood clots, History of cardioversion, Liver cyst, Macular degeneration, Pneumonia, Rectocele, and Stage 3b chronic kidney disease ([address] has a past surgical history that includes ablation of dysrhythmic focus; Pacemaker insertion; Femur Surgery (Right); Appendectomy; Hysterectomy; and Cataract removal (Bilateral). Restrictions  Restrictions/Precautions  Restrictions/Precautions: Fall Risk, Up as Tolerated, General Precautions  Position Activity Restriction  Other position/activity restrictions: bed alarm/chair alarm  Subjective   General  Chart Reviewed:  Yes  Additional Pertinent Hx: CHF, COPD, DNRCC-A  Pain Screening  Patient Currently in Pain: Denies  Pain Assessment  Pain Assessment: 0-10  Vital Signs  Patient Currently in Pain: Denies       Orientation     Cognition      Objective   Bed mobility  Scooting: Stand by assistance  Transfers  Sit to Stand: Contact guard assistance  Stand to sit: Contact guard assistance  Stand Pivot Transfers: Contact guard assistance  Ambulation  Ambulation?: Yes  Ambulation 1  Surface: level tile  Device: No Device;Rolling Walker  Assistance: Contact guard assistance;Minimal assistance  Quality of Gait: gait slow steady  Gait Deviations: Decreased step length;Decreased step height  Distance: 110 ft     Balance  Posture: Fair  Sitting - Static: Good  Sitting - Dynamic: Good  Standing - Static: Fair;+            Comment: assisted pt to bathroom for toileting              G-Code     OutComes Score                                                     AM-PAC Score  AM-PAC Inpatient Mobility Raw Score : 16 (10/05/21 1328)  AM-PAC Inpatient T-Scale Score : 40.78 (10/05/21 1328)  Mobility Inpatient CMS 0-100% Score: 54.16 (10/05/21 1328)  Mobility Inpatient CMS G-Code Modifier : CK (10/05/21 1328)          Goals  Short term goals  Time Frame for Short term goals: 12 visits:  Short term goal 1: Pt. to be indep. with bed mob. Short term goal 2: Pt. to be indep with transfers with approp. AD  Short term goal 3: Pt. to be SBA for gait with approp. AD, 50ft (household distance) with approp. O2.   (pt. with macular degeneration so will need visual assist while here)  Short term goal 4: Pt. to tolerate 25+ min. of PT for ther ex/ gait/ balance training  Short term goal 5: Pt. to safely negotiate 2-3 steps to prepare for entering her home at discharge. Patient Goals   Patient goals : d/c home    Plan    Plan  Times per week: 1-2x/day; 5-6days/wk  Specific instructions for Next Treatment: AD trial-  RW vs. st. cane  Current Treatment Recommendations: ROM, Transfer Training, Strengthening, Endurance Training, Balance Training, Gait Training, Functional Mobility Training, Safety Education & Training, Patient/Caregiver Education & Training  Safety Devices  Type of devices:  All fall risk precautions in place, Gait belt, Call light within reach, Patient at risk for falls, Nurse notified, Left in chair, Chair alarm in place   Returned to see pt from 1435 to 1450 for assist to bathroom and back to bed CGA no device amb.  15 ft x 2  Therapy Time   Individual Concurrent Group Co-treatment   Time In  2370         Time Out  1128         Minutes  23                 ZACH GUAN, PTA

## 2021-10-05 NOTE — DISCHARGE INSTR - ACTIVITY
Take medications as prescribed  Drink plenty of fluids and eat a high fiber diet to help prevent constipation

## 2021-10-05 NOTE — PROGRESS NOTES
CLINICAL PHARMACY NOTE: MEDS TO BEDS    Total # of Prescriptions Filled: 0   The following medications were delivered to the patient:        Additional Documentation:  PT DECLINED POLYETHYLENE GLYCOL POWDER

## 2021-10-05 NOTE — PROGRESS NOTES
[facility]  Office: [address], DO, Mir Milton, DO, Silvia Fabi, DO, Ann Boone Blood, DO, Otis Gomez MD, Becky Murray MD, Juan Diego Patton MD, Ang Ga MD, Mona Fay MD, Cadence Jean-Baptiste MD, Easton Huntley MD, Josey Davies, DO, Roland Pedraza DO, Yolanda Saleem MD,  Floridalma Membreno DO, Jian Benites MD, Lance Lo MD, Mily Brooks MD, Janay Craft MD, , Alda Miranda MD, Gladis Arellano MD, Starla Keyes MD, Radha Lane, [facility], [facility], CNP, Sangeeta Nelson, CNP, Blanca Gomes, CNS, Angi Cortez, CNP, Anjum Byrd, CNP, Ana Howard, CNP, Rita De Leon, CNP, Juan Monroy, CNP, Rob Newton, PA-C, Cleone Denver, [facility], Gabbie Lopez, CNP, Casey Clark, CNP, Jennifer Dial, CNP, Rob [facility], [facility], Carlos Canela, [facility], Tonya Butts, [facility], Ubaldo Noel, CNP, Huberan Barrios, [facility]    Progress Note    10/5/2021    9:10 AM    Name:   Cliff Spicer  MRN:     [id number]     Acct:      [de-identified]   Room:   2025/2025-01   Day:  0  Admit Date:  10/3/2021  3:36 PM    PCP:   Maulik Morrison MD  Code Status:  DNR-CCA    Subjective:     C/C:   Chief Complaint   Patient presents with    Abdominal Pain     Interval History Status: significantly improved. Patient seen and examined. Abdominal pain resolving, had 7 bowel movements so far, Patient complains of abdominal pain but no tenderness to palpation. Nasal cannula not on patient. Brief History:     80year old female with past medical hsitory of rectocele, afib on xarelto, COPD, CHF presents with abdominal pain found to be constipated improved with PO medications. Patient to be discharged home.      Review of Systems:     Constitutional:  negative for chills, fevers, sweats  Respiratory:  negative for cough, dyspnea on exertion, shortness of breath, wheezing  Cardiovascular:  negative for chest pain, chest pressure/discomfort, lower extremity edema, palpitations  Gastrointestinal:  negative for abdominal pain, constipation, diarrhea, nausea, vomiting  Neurological:  negative for dizziness, headache    Medications: Allergies: Allergies   Allergen Reactions    Gabapentin Other (See Comments)     Other reaction(s): hallucinating/jerking    Keflex [Cephalexin] Anaphylaxis, Hives and Swelling    Tegretol [Carbamazepine] Anaphylaxis    Adhesive Tape Rash    Demerol Hcl [Meperidine] Nausea And Vomiting    Flagyl [Metronidazole] Nausea And Vomiting       Current Meds:   Scheduled Meds:    guaiFENesin  600 mg Oral BID    albuterol  2.5 mg Nebulization TID    dilTIAZem  300 mg Oral Daily    apixaban  2.5 mg Oral BID    [Held by provider] furosemide  20 mg Oral BID    metoprolol succinate  25 mg Oral Daily    [Held by provider] spironolactone  25 mg Oral Daily    sodium chloride flush  5-40 mL IntraVENous 2 times per day    polyethylene glycol  17 g Oral BID     Continuous Infusions:    sodium chloride Stopped (10/05/21 0008)    sodium chloride       PRN Meds: albuterol, sodium chloride flush, sodium chloride, potassium chloride **OR** potassium alternative oral replacement **OR** potassium chloride, magnesium sulfate, ondansetron **OR** ondansetron, acetaminophen **OR** acetaminophen, oxyCODONE-acetaminophen **OR** oxyCODONE-acetaminophen    Data:     Past Medical History:   has a past medical history of A-fib ([facility] Atrial fibrillation ([facility] CHF (congestive heart failure) ([facility] COPD (chronic obstructive pulmonary disease) ([facility] Diverticulosis, TIMOTEO (generalized anxiety disorder), H/O blood clots, History of cardioversion, Liver cyst, Macular degeneration, Pneumonia, Rectocele, and Stage 3b chronic kidney disease ([facility] Social History:   reports that she quit smoking about 14 years ago. Her smoking use included cigarettes.  She has never used smokeless tobacco. She reports that she does not drink alcohol and does not use drugs. Family History:   Family History   Family history unknown: Yes       Vitals:  BP (!) 122/51   Pulse 74   Temp 98.8 °F (37.1 °C) (Oral)   Resp 18   Ht 4' 11\" (1.499 m)   Wt 130 lb 4.8 oz (59.1 kg)   SpO2 96%   BMI 26.32 kg/m²   Temp (24hrs), Av.3 °F (36.8 °C), Min:98.1 °F (36.7 °C), Max:98.8 °F (37.1 °C)    No results for input(s): POCGLU in the last 72 hours. I/O (24Hr):     Intake/Output Summary (Last 24 hours) at 10/5/2021 0910  Last data filed at 10/5/2021 0703  Gross per 24 hour   Intake 953.84 ml   Output 1700 ml   Net -746.16 ml       Labs:  Hematology:  Recent Labs     10/03/21  1552 10/04/21  0600   WBC 7.9 8.2   RBC 4.36 3.57*   HGB 14.0 11.2*   HCT 41.8 34.1*   MCV 95.8 95.5   MCH 32.0 31.4   MCHC 33.4 32.8   RDW 13.3 12.5    258   MPV 7.9 9.9   INR  --  1.2     Chemistry:  Recent Labs     10/03/21  1552 10/04/21  0600    144   K 5.0 4.1    110*   CO2 29 23   GLUCOSE 115* 93   BUN 28* 22   CREATININE 1.00* 0.68   ANIONGAP 7* 11   LABGLOM 53* >60   GFRAA >60 >60   CALCIUM 9.4 7.7*     Recent Labs     10/03/21  1552   PROT 7.2   LABALBU 4.3   AST 15   ALT 7   ALKPHOS 124*   BILITOT 0.40   LIPASE 22     ABG:No results found for: POCPH, PHART, PH, POCPCO2, MZA0BQP, PCO2, POCPO2, PO2ART, PO2, POCHCO3, BGE2TJS, HCO3, NBEA, PBEA, BEART, BE, THGBART, THB, UIU5ZKV, ASDT6EOV, V5IAMRUJ, O2SAT, FIO2  Lab Results   Component Value Date/Time    SPECIAL NOT REPORTED 10/11/2016 05:46 PM     Lab Results   Component Value Date/Time    CULTURE NO SIGNIFICANT GROWTH 10/11/2016 05:46 PM    CULTURE  10/11/2016 05:46 PM     Performed at [address] [phone]       Radiology:  CT ABDOMEN PELVIS W IV CONTRAST Additional Contrast? None    Result Date: 10/3/2021  No acute disease noted       Physical Examination:        General appearance:  alert, cooperative and no distress  Mental Status:  oriented to person, place and time and normal affect  Lungs:  Decreased in bases, no wheezing  Heart:  regular rate and rhythm, no murmur  Abdomen:  soft, nontender, nondistended, normal bowel sounds, no masses  Extremities:  no edema, redness, tenderness in the calves  Skin:  no gross lesions, rashes, induration    Assessment:        Hospital Problems         Last Modified POA    * (Principal) Rectocele 10/4/2021 Yes    COPD without exacerbation ([facility] 10/4/2021 Yes    CHF (congestive heart failure) ([facility] 10/4/2021 Yes    Fecal impaction ([facility] 10/3/2021 Yes    Constipation 10/4/2021 Yes    Stage 3b chronic kidney disease ([facility] 10/4/2021 Yes    TIMOTEO (generalized anxiety disorder) 10/4/2021 Yes          Plan:        1. Po intake improving  2. Patinet tolerating PO diet  3. Having multiple bowel movments. 4. Continue nikki emedications  5.  Plan to discharge home      Bernarda Morales MD  10/5/2021  9:10 AM

## 2021-10-06 ENCOUNTER — CARE COORDINATION (OUTPATIENT)
Dept: CASE MANAGEMENT | Age: 86
End: 2021-10-06

## 2021-10-06 NOTE — CARE COORDINATION
[address] Transitions Initial Follow Up Call    Call within 2 business days of discharge: Yes    Patient: Santiago Gregory Patient : 1935   MRN: [id number]  Reason for Admission: Constipation  Discharge Date: 10/5/21 RARS: No data recorded    Last Discharge  [place] [id number]       Complaint Diagnosis Description Type Department Provider    10/3/21 Abdominal Pain Constipation due to outlet dysfunction . .. ED to Hosp-Admission (Discharged) (TRANSFER) JESUS Ramos MD; Carlo Safer Po. .. Spoke with: Patient    Facility: [facility]    Non-face-to-face services provided:  Scheduled appointment with PCP-patient will schedule f/u with PCP  Obtained and reviewed discharge summary and/or continuity of care documents     Patient states she is feeling better today. She says the abdominal pain is now gone, bowels are moving and have been since coming home. She had quite a few bm's yesterday and a couple since coming home. She said they are starting to return to normal and no longer feels constipated. She has not gotten the Glycolax yet, expressed she should have on hand in case it was needed. She will have someone pick this up for her. She denies any needs or concerns, states she will call and schedule own follow up with PCP. Transitions of Care Initial Call    Was this an external facility discharge? No     Challenges to be reviewed by the provider   Additional needs identified to be addressed with provider: No  none             Method of communication with provider : none      Advance Care Planning:   Does patient have an Advance Directive: not on file. Was this a readmission? No  Patient stated reason for admission: constipated  Patients top risk factors for readmission: medical condition-constipation, rectocele    Care Transition Nurse (CTN) contacted the patient by telephone to perform post hospital discharge assessment. Verified name and  with patient as identifiers.  Provided introduction to self, and explanation of the CTN role. CTN reviewed discharge instructions, medical action plan and red flags with patient who verbalized understanding. Patient given an opportunity to ask questions and does not have any further questions or concerns at this time. Were discharge instructions available to patient? Yes. Reviewed appropriate site of care based on symptoms and resources available to patient including: PCP. The patient agrees to contact the PCP office for questions related to their healthcare. Medication reconciliation was performed with patient, who verbalizes understanding of administration of home medications. Advised obtaining a 90-day supply of all daily and as-needed medications. Covid Risk Education     Educated patient about risk for severe COVID-19 due to risk factors according to CDC guidelines. CTN reviewed discharge instructions, medical action plan and red flag symptoms with the patient who verbalized understanding. Discussed COVID vaccination status: Yes. Education provided on COVID-19 vaccination as appropriate. Discussed exposure protocols and quarantine with CDC Guidelines. Patient was given an opportunity to verbalize any questions and concerns and agrees to contact CTN or health care provider for questions related to their healthcare. Reviewed and educated patient on any new and changed medications related to discharge diagnosis. Was patient discharged with a pulse oximeter? No        CTN provided contact information. No further follow-up call indicated based on severity of symptoms and risk factors.   Plan for next call: Patient independent, no needs identified, will schedule own f/u           Care Transitions 24 Hour Call    Schedule Follow Up Appointment with PCP: Completed  Do you have any ongoing symptoms?: No  Do you have a copy of your discharge instructions?: Yes  Do you have all of your prescriptions and are they filled?: No  Have you been contacted by a [address]?: No  Have you scheduled your follow up appointment?: No  Were you discharged with any Home Care or Post Acute Services: No  Care Transitions Interventions         Follow Up  No future appointments.     Dagmar Zamora RN

## 2021-12-20 ENCOUNTER — APPOINTMENT (OUTPATIENT)
Dept: GENERAL RADIOLOGY | Facility: CLINIC | Age: 86
DRG: 292 | End: 2021-12-20
Payer: COMMERCIAL

## 2021-12-20 ENCOUNTER — HOSPITAL ENCOUNTER (INPATIENT)
Age: 86
LOS: 3 days | Discharge: HOME | DRG: 292 | End: 2021-12-23
Attending: EMERGENCY MEDICINE | Admitting: INTERNAL MEDICINE
Payer: COMMERCIAL

## 2021-12-20 DIAGNOSIS — R06.03 ACUTE RESPIRATORY DISTRESS: ICD-10-CM

## 2021-12-20 DIAGNOSIS — I50.9 CONGESTIVE HEART FAILURE, UNSPECIFIED HF CHRONICITY, UNSPECIFIED HEART FAILURE TYPE (HCC): Primary | ICD-10-CM

## 2021-12-20 LAB
ABSOLUTE EOS #: 0 K/UL (ref 0–0.4)
ABSOLUTE IMMATURE GRANULOCYTE: ABNORMAL K/UL (ref 0–0.3)
ABSOLUTE LYMPH #: 1.4 K/UL (ref 1–4.8)
ABSOLUTE MONO #: 1 K/UL (ref 0.1–1.2)
ANION GAP SERPL CALCULATED.3IONS-SCNC: 13 MMOL/L (ref 9–17)
BASOPHILS # BLD: 0 % (ref 0–2)
BASOPHILS ABSOLUTE: 0.1 K/UL (ref 0–0.2)
BNP INTERPRETATION: ABNORMAL
BUN BLDV-MCNC: 32 MG/DL (ref 8–23)
BUN/CREAT BLD: ABNORMAL (ref 9–20)
CALCIUM SERPL-MCNC: 9.8 MG/DL (ref 8.6–10.4)
CHLORIDE BLD-SCNC: 103 MMOL/L (ref 98–107)
CO2: 24 MMOL/L (ref 20–31)
CREAT SERPL-MCNC: 1 MG/DL (ref 0.5–0.9)
DIFFERENTIAL TYPE: ABNORMAL
EKG ATRIAL RATE: 60 BPM
EKG P-R INTERVAL: 168 MS
EKG Q-T INTERVAL: 404 MS
EKG QRS DURATION: 128 MS
EKG QTC CALCULATION (BAZETT): 541 MS
EKG R AXIS: -63 DEGREES
EKG T AXIS: -24 DEGREES
EKG VENTRICULAR RATE: 108 BPM
EOSINOPHILS RELATIVE PERCENT: 0 % (ref 1–4)
GFR AFRICAN AMERICAN: >60 ML/MIN
GFR NON-AFRICAN AMERICAN: 53 ML/MIN
GFR SERPL CREATININE-BSD FRML MDRD: ABNORMAL ML/MIN/{1.73_M2}
GFR SERPL CREATININE-BSD FRML MDRD: ABNORMAL ML/MIN/{1.73_M2}
GLUCOSE BLD-MCNC: 173 MG/DL (ref 70–99)
HCT VFR BLD CALC: 44 % (ref 36–46)
HCT VFR BLD CALC: 45.5 % (ref 36–46)
HEMOGLOBIN: 14.9 G/DL (ref 12–16)
HEMOGLOBIN: 15 G/DL (ref 12–16)
IMMATURE GRANULOCYTES: ABNORMAL %
INR BLD: 1
LACTIC ACID, SEPSIS WHOLE BLOOD: NORMAL MMOL/L (ref 0.5–1.9)
LACTIC ACID, SEPSIS: 1 MMOL/L (ref 0.5–1.9)
LYMPHOCYTES # BLD: 8 % (ref 24–44)
MCH RBC QN AUTO: 31.5 PG (ref 26–34)
MCH RBC QN AUTO: 32.1 PG (ref 26–34)
MCHC RBC AUTO-ENTMCNC: 32.9 G/DL (ref 31–37)
MCHC RBC AUTO-ENTMCNC: 33.8 G/DL (ref 31–37)
MCV RBC AUTO: 95.2 FL (ref 80–100)
MCV RBC AUTO: 95.7 FL (ref 80–100)
MONOCYTES # BLD: 6 % (ref 2–11)
NRBC AUTOMATED: ABNORMAL PER 100 WBC
NRBC AUTOMATED: ABNORMAL PER 100 WBC
PDW BLD-RTO: 13.8 % (ref 12.5–15.4)
PDW BLD-RTO: 14.1 % (ref 12.5–15.4)
PLATELET # BLD: 202 K/UL (ref 140–450)
PLATELET # BLD: 298 K/UL (ref 140–450)
PLATELET ESTIMATE: ABNORMAL
PMV BLD AUTO: 8.1 FL (ref 6–12)
PMV BLD AUTO: 8.3 FL (ref 6–12)
POTASSIUM SERPL-SCNC: 4.2 MMOL/L (ref 3.7–5.3)
PRO-BNP: 1296 PG/ML
PROTHROMBIN TIME: 10.1 SEC (ref 9.4–12.6)
RBC # BLD: 4.63 M/UL (ref 4–5.2)
RBC # BLD: 4.75 M/UL (ref 4–5.2)
RBC # BLD: ABNORMAL 10*6/UL
SARS-COV-2, RAPID: NOT DETECTED
SEG NEUTROPHILS: 86 % (ref 36–66)
SEGMENTED NEUTROPHILS ABSOLUTE COUNT: 15.3 K/UL (ref 1.8–7.7)
SODIUM BLD-SCNC: 140 MMOL/L (ref 135–144)
SPECIMEN DESCRIPTION: NORMAL
TROPONIN INTERP: NORMAL
TROPONIN T: NORMAL NG/ML
TROPONIN, HIGH SENSITIVITY: 11 NG/L (ref 0–14)
WBC # BLD: 17.9 K/UL (ref 3.5–11)
WBC # BLD: 22 K/UL (ref 3.5–11)
WBC # BLD: ABNORMAL 10*3/UL

## 2021-12-20 PROCEDURE — 99222 1ST HOSP IP/OBS MODERATE 55: CPT | Performed by: INTERNAL MEDICINE

## 2021-12-20 PROCEDURE — 84484 ASSAY OF TROPONIN QUANT: CPT

## 2021-12-20 PROCEDURE — 2700000000 HC OXYGEN THERAPY PER DAY

## 2021-12-20 PROCEDURE — 6360000002 HC RX W HCPCS: Performed by: INTERNAL MEDICINE

## 2021-12-20 PROCEDURE — 85025 COMPLETE CBC W/AUTO DIFF WBC: CPT

## 2021-12-20 PROCEDURE — 1200000000 HC SEMI PRIVATE

## 2021-12-20 PROCEDURE — 6360000002 HC RX W HCPCS: Performed by: NURSE PRACTITIONER

## 2021-12-20 PROCEDURE — 94761 N-INVAS EAR/PLS OXIMETRY MLT: CPT

## 2021-12-20 PROCEDURE — 36415 COLL VENOUS BLD VENIPUNCTURE: CPT

## 2021-12-20 PROCEDURE — 6370000000 HC RX 637 (ALT 250 FOR IP): Performed by: NURSE PRACTITIONER

## 2021-12-20 PROCEDURE — 6370000000 HC RX 637 (ALT 250 FOR IP): Performed by: INTERNAL MEDICINE

## 2021-12-20 PROCEDURE — 83605 ASSAY OF LACTIC ACID: CPT

## 2021-12-20 PROCEDURE — 6360000002 HC RX W HCPCS: Performed by: EMERGENCY MEDICINE

## 2021-12-20 PROCEDURE — 85027 COMPLETE CBC AUTOMATED: CPT

## 2021-12-20 PROCEDURE — 2580000003 HC RX 258: Performed by: INTERNAL MEDICINE

## 2021-12-20 PROCEDURE — 96374 THER/PROPH/DIAG INJ IV PUSH: CPT

## 2021-12-20 PROCEDURE — 71045 X-RAY EXAM CHEST 1 VIEW: CPT

## 2021-12-20 PROCEDURE — 99285 EMERGENCY DEPT VISIT HI MDM: CPT

## 2021-12-20 PROCEDURE — 80048 BASIC METABOLIC PNL TOTAL CA: CPT

## 2021-12-20 PROCEDURE — 87635 SARS-COV-2 COVID-19 AMP PRB: CPT

## 2021-12-20 PROCEDURE — 85610 PROTHROMBIN TIME: CPT

## 2021-12-20 PROCEDURE — 87040 BLOOD CULTURE FOR BACTERIA: CPT

## 2021-12-20 PROCEDURE — 93005 ELECTROCARDIOGRAM TRACING: CPT | Performed by: EMERGENCY MEDICINE

## 2021-12-20 PROCEDURE — 83880 ASSAY OF NATRIURETIC PEPTIDE: CPT

## 2021-12-20 RX ORDER — IPRATROPIUM BROMIDE AND ALBUTEROL SULFATE 2.5; .5 MG/3ML; MG/3ML
1 SOLUTION RESPIRATORY (INHALATION) EVERY 4 HOURS PRN
Status: DISCONTINUED | OUTPATIENT
Start: 2021-12-20 | End: 2021-12-23 | Stop reason: HOSPADM

## 2021-12-20 RX ORDER — SODIUM CHLORIDE 0.9 % (FLUSH) 0.9 %
5-40 SYRINGE (ML) INJECTION EVERY 12 HOURS SCHEDULED
Status: DISCONTINUED | OUTPATIENT
Start: 2021-12-20 | End: 2021-12-23 | Stop reason: HOSPADM

## 2021-12-20 RX ORDER — DIAZEPAM 5 MG/1
5 TABLET ORAL 3 TIMES DAILY PRN
COMMUNITY

## 2021-12-20 RX ORDER — DIAZEPAM 5 MG/1
5 TABLET ORAL EVERY 12 HOURS PRN
Status: DISCONTINUED | OUTPATIENT
Start: 2021-12-20 | End: 2021-12-23 | Stop reason: HOSPADM

## 2021-12-20 RX ORDER — ZOLPIDEM TARTRATE 10 MG/1
5-10 TABLET ORAL NIGHTLY PRN
Status: ON HOLD | COMMUNITY
End: 2021-12-23 | Stop reason: HOSPADM

## 2021-12-20 RX ORDER — DICYCLOMINE HYDROCHLORIDE 10 MG/1
10 CAPSULE ORAL EVERY 8 HOURS
Status: ON HOLD | COMMUNITY
End: 2021-12-23 | Stop reason: HOSPADM

## 2021-12-20 RX ORDER — ONDANSETRON 4 MG/1
4 TABLET, ORALLY DISINTEGRATING ORAL EVERY 8 HOURS PRN
Status: DISCONTINUED | OUTPATIENT
Start: 2021-12-20 | End: 2021-12-23 | Stop reason: HOSPADM

## 2021-12-20 RX ORDER — ACETAMINOPHEN 325 MG/1
650 TABLET ORAL EVERY 6 HOURS PRN
Status: DISCONTINUED | OUTPATIENT
Start: 2021-12-20 | End: 2021-12-23 | Stop reason: HOSPADM

## 2021-12-20 RX ORDER — POLYVINYL ALCOHOL 14 MG/ML
1 SOLUTION/ DROPS OPHTHALMIC PRN
Status: DISCONTINUED | OUTPATIENT
Start: 2021-12-20 | End: 2021-12-23 | Stop reason: HOSPADM

## 2021-12-20 RX ORDER — SPIRONOLACTONE 25 MG/1
25 TABLET ORAL DAILY
Status: DISCONTINUED | OUTPATIENT
Start: 2021-12-20 | End: 2021-12-23 | Stop reason: HOSPADM

## 2021-12-20 RX ORDER — ONDANSETRON 2 MG/ML
4 INJECTION INTRAMUSCULAR; INTRAVENOUS EVERY 6 HOURS PRN
Status: DISCONTINUED | OUTPATIENT
Start: 2021-12-20 | End: 2021-12-23 | Stop reason: HOSPADM

## 2021-12-20 RX ORDER — POLYETHYLENE GLYCOL 3350 17 G/17G
17 POWDER, FOR SOLUTION ORAL DAILY PRN
Status: DISCONTINUED | OUTPATIENT
Start: 2021-12-20 | End: 2021-12-23 | Stop reason: HOSPADM

## 2021-12-20 RX ORDER — METOPROLOL SUCCINATE 25 MG/1
25 TABLET, EXTENDED RELEASE ORAL DAILY
Status: DISCONTINUED | OUTPATIENT
Start: 2021-12-20 | End: 2021-12-23 | Stop reason: HOSPADM

## 2021-12-20 RX ORDER — SODIUM CHLORIDE 0.9 % (FLUSH) 0.9 %
10 SYRINGE (ML) INJECTION PRN
Status: DISCONTINUED | OUTPATIENT
Start: 2021-12-20 | End: 2021-12-23 | Stop reason: HOSPADM

## 2021-12-20 RX ORDER — LISINOPRIL 5 MG/1
5 TABLET ORAL DAILY
Status: DISCONTINUED | OUTPATIENT
Start: 2021-12-21 | End: 2021-12-23 | Stop reason: HOSPADM

## 2021-12-20 RX ORDER — BISACODYL 10 MG
10 SUPPOSITORY, RECTAL RECTAL DAILY PRN
Status: DISCONTINUED | OUTPATIENT
Start: 2021-12-20 | End: 2021-12-23 | Stop reason: HOSPADM

## 2021-12-20 RX ORDER — POLYETHYLENE GLYCOL 3350 17 G/17G
17 POWDER, FOR SOLUTION ORAL DAILY
COMMUNITY

## 2021-12-20 RX ORDER — FUROSEMIDE 10 MG/ML
20 INJECTION INTRAMUSCULAR; INTRAVENOUS ONCE
Status: COMPLETED | OUTPATIENT
Start: 2021-12-20 | End: 2021-12-20

## 2021-12-20 RX ORDER — CLOTRIMAZOLE AND BETAMETHASONE DIPROPIONATE 10; .5 MG/ML; MG/ML
LOTION TOPICAL 2 TIMES DAILY
COMMUNITY

## 2021-12-20 RX ORDER — FUROSEMIDE 10 MG/ML
40 INJECTION INTRAMUSCULAR; INTRAVENOUS 2 TIMES DAILY
Status: DISCONTINUED | OUTPATIENT
Start: 2021-12-20 | End: 2021-12-21

## 2021-12-20 RX ORDER — BISACODYL 10 MG
10 SUPPOSITORY, RECTAL RECTAL DAILY PRN
COMMUNITY

## 2021-12-20 RX ORDER — ACETAMINOPHEN 650 MG/1
650 SUPPOSITORY RECTAL EVERY 6 HOURS PRN
Status: DISCONTINUED | OUTPATIENT
Start: 2021-12-20 | End: 2021-12-23 | Stop reason: HOSPADM

## 2021-12-20 RX ORDER — FUROSEMIDE 20 MG/1
20 TABLET ORAL DAILY PRN
Status: ON HOLD | COMMUNITY
End: 2021-12-23 | Stop reason: HOSPADM

## 2021-12-20 RX ORDER — SODIUM CHLORIDE 9 MG/ML
25 INJECTION, SOLUTION INTRAVENOUS PRN
Status: DISCONTINUED | OUTPATIENT
Start: 2021-12-20 | End: 2021-12-23 | Stop reason: HOSPADM

## 2021-12-20 RX ADMIN — DIAZEPAM 5 MG: 5 TABLET ORAL at 21:22

## 2021-12-20 RX ADMIN — APIXABAN 2.5 MG: 2.5 TABLET, FILM COATED ORAL at 20:45

## 2021-12-20 RX ADMIN — ENOXAPARIN SODIUM 40 MG: 60 INJECTION SUBCUTANEOUS at 09:14

## 2021-12-20 RX ADMIN — POLYETHYLENE GLYCOL 3350 17 G: 17 POWDER, FOR SOLUTION ORAL at 15:56

## 2021-12-20 RX ADMIN — FUROSEMIDE 40 MG: 10 INJECTION, SOLUTION INTRAMUSCULAR; INTRAVENOUS at 17:55

## 2021-12-20 RX ADMIN — DILTIAZEM HYDROCHLORIDE 300 MG: 180 CAPSULE, COATED, EXTENDED RELEASE ORAL at 13:18

## 2021-12-20 RX ADMIN — SPIRONOLACTONE 25 MG: 25 TABLET, FILM COATED ORAL at 13:43

## 2021-12-20 RX ADMIN — SODIUM CHLORIDE, PRESERVATIVE FREE 10 ML: 5 INJECTION INTRAVENOUS at 20:45

## 2021-12-20 RX ADMIN — APIXABAN 2.5 MG: 2.5 TABLET, FILM COATED ORAL at 13:18

## 2021-12-20 RX ADMIN — BISACODYL 10 MG: 10 SUPPOSITORY RECTAL at 20:45

## 2021-12-20 RX ADMIN — FUROSEMIDE 20 MG: 10 INJECTION, SOLUTION INTRAMUSCULAR; INTRAVENOUS at 05:27

## 2021-12-20 ASSESSMENT — ENCOUNTER SYMPTOMS
CONSTIPATION: 1
BLOOD IN STOOL: 0
VOMITING: 0
SHORTNESS OF BREATH: 1
ABDOMINAL DISTENTION: 0
COUGH: 0
GASTROINTESTINAL NEGATIVE: 1
NAUSEA: 0
ALLERGIC/IMMUNOLOGIC NEGATIVE: 1
WHEEZING: 0
PHOTOPHOBIA: 0
EYES NEGATIVE: 1

## 2021-12-20 ASSESSMENT — PAIN DESCRIPTION - PAIN TYPE
TYPE: ACUTE PAIN
TYPE: CHRONIC PAIN

## 2021-12-20 ASSESSMENT — PAIN SCALES - GENERAL
PAINLEVEL_OUTOF10: 0
PAINLEVEL_OUTOF10: 7

## 2021-12-20 ASSESSMENT — PAIN DESCRIPTION - LOCATION
LOCATION: ABDOMEN
LOCATION: OTHER (COMMENT)

## 2021-12-20 ASSESSMENT — PAIN DESCRIPTION - ORIENTATION: ORIENTATION: MID

## 2021-12-20 ASSESSMENT — PAIN DESCRIPTION - FREQUENCY: FREQUENCY: CONTINUOUS

## 2021-12-20 ASSESSMENT — PAIN DESCRIPTION - PROGRESSION: CLINICAL_PROGRESSION: NOT CHANGED

## 2021-12-20 ASSESSMENT — PAIN DESCRIPTION - DESCRIPTORS: DESCRIPTORS: ACHING

## 2021-12-20 ASSESSMENT — PAIN DESCRIPTION - ONSET: ONSET: ON-GOING

## 2021-12-20 ASSESSMENT — PAIN - FUNCTIONAL ASSESSMENT: PAIN_FUNCTIONAL_ASSESSMENT: ACTIVITIES ARE NOT PREVENTED

## 2021-12-20 NOTE — ED PROVIDER NOTES
eMERGENCY dEPARTMENT eNCOUnter      Pt Name: Jean Marie Chin  MRN: [id number]  [place] 1935  Date of evaluation: 12/20/2021      CHIEF COMPLAINT       Chief Complaint   Patient presents with    Shortness of Breath    Atrial Fibrillation          HISTORY OF PRESENT ILLNESS    Jean Marie Chin is a 80 y.o. female who presents emergency department as a private walk-in with complaints of shortness of breath and a rapid irregular heartbeat. Patient does have a history of congestive heart failure and atrial fibrillation. She also has a COPD patient and smoked up until recently. She has had a history of a cardioversion in the past.  She denies chest pain at this point in time. She denies nausea vomiting or diaphoresis. REVIEW OF SYSTEMS       Review of Systems   Constitutional: Negative. HENT: Negative. Eyes: Negative. Respiratory: Positive for shortness of breath. Cardiovascular: Positive for palpitations. Gastrointestinal: Negative. Endocrine: Negative. Genitourinary: Negative. Musculoskeletal: Negative. Skin: Negative. Allergic/Immunologic: Negative. Neurological: Negative. Hematological: Negative. Psychiatric/Behavioral: Negative. PAST MEDICAL HISTORY    has a past medical history of A-fib ([address] Atrial fibrillation ([address] CHF (congestive heart failure) ([address] COPD (chronic obstructive pulmonary disease) ([address] Diverticulosis, TIMOTEO (generalized anxiety disorder), H/O blood clots, History of cardioversion, Liver cyst, Macular degeneration, Pneumonia, Rectocele, and Stage 3b chronic kidney disease ([address] SURGICAL HISTORY      has a past surgical history that includes ablation of dysrhythmic focus; Pacemaker insertion; Femur Surgery (Right); Appendectomy; Hysterectomy; and Cataract removal (Bilateral). CURRENT MEDICATIONS       Previous Medications    CLOTRIMAZOLE-BETAMETHASONE (LOTRISONE) 1-0.05 % CREAM    Apply topically 2 times daily.     DILTIAZEM (CARDIZEM CD) 300 MG EXTENDED RELEASE CAPSULE    Take 300 mg by mouth daily    ELIQUIS 2.5 MG TABS TABLET    Take 2.5 mg by mouth 2 times daily     FUROSEMIDE (LASIX) 20 MG TABLET    Take 20 mg by mouth 2 times daily    METOPROLOL SUCCINATE (TOPROL XL) 25 MG EXTENDED RELEASE TABLET    Take 25 mg by mouth daily    OXYCODONE-ACETAMINOPHEN (PERCOCET) 5-325 MG PER TABLET    Take 1 tablet by mouth every 4 hours as needed for Pain. SPIRONOLACTONE (ALDACTONE) 25 MG TABLET    Take 25 mg by mouth       ALLERGIES     is allergic to gabapentin, keflex [cephalexin], tegretol [carbamazepine], adhesive tape, demerol hcl [meperidine], and flagyl [metronidazole]. FAMILY HISTORY     has no family status information on file. Family history is unknown by patient. SOCIAL HISTORY      reports that she quit smoking about 15 years ago. Her smoking use included cigarettes. She has never used smokeless tobacco. She reports that she does not drink alcohol and does not use drugs. PHYSICAL EXAM     INITIAL VITALS:  height is 4' 11\" (1.499 m) and weight is 54.4 kg (120 lb). Her oral temperature is 97.6 °F (36.4 °C). Her blood pressure is 117/63 and her pulse is 107. Her respiration is 22 and oxygen saturation is 94%. Constitutional: Alert, oriented x3, nontoxic, afebrile, answering questions appropriately, acting properly for age, in moderate acute distress  HEENT: Extraocular muscles intact, mucus membranes moist, TMs clear bilaterally, no posterior pharyngeal erythema or exudates, Pupils equal, round, reactive to light,   Neck: Trachea midline, Supple without lymphadenopathy, no posterior midline neck tenderness to palpation  Cardiovascular: Irregularly irregular rhythm and rate no S3, S4, or murmurs  Respiratory: Clear to auscultation bilaterally no wheezes, rhonchi, rales, no respiratory distress. Some subcostal retractions present  Gastrointestinal: Soft, nontender, nondistended, positive bowel sounds.   No rebound, rigidity, or guarding. Musculoskeletal: No extremity pain or swelling  Neurologic: Moving all 4 extremities without difficulty there are no gross focal neurologic deficits  Skin: Warm and dry      DIFFERENTIAL DIAGNOSIS/ MDM:     COPD exacerbation/atrial fibrillation. We will also test the patient for coronavirus. DIAGNOSTIC RESULTS     EKG: All EKG's are interpreted by the Emergency Department Physician who either signs or Co-signs this chart in the absence of a cardiologist.    EKG shows a sinus tachycardia at about 108 beats a minute with multiple PACs noted TX interval 0.168, QRS duration 0.128, QTc of 551 ms biphasic T waves noted out laterally no ST elevation noted. Not indicated unless otherwise documented above    LABS:  Results for orders placed or performed during the hospital encounter of 12/20/21   COVID-19, Rapid    Specimen: Nasopharyngeal Swab   Result Value Ref Range    Specimen Description . NASOPHARYNGEAL SWAB     SARS-CoV-2, Rapid Not Detected Not Detected   CBC Auto Differential   Result Value Ref Range    WBC 17.9 (H) 3.5 - 11.0 k/uL    RBC 4.75 4.0 - 5.2 m/uL    Hemoglobin 15.0 12.0 - 16.0 g/dL    Hematocrit 45.5 36 - 46 %    MCV 95.7 80 - 100 fL    MCH 31.5 26 - 34 pg    MCHC 32.9 31 - 37 g/dL    RDW 14.1 12.5 - 15.4 %    Platelets 366 457 - 268 k/uL    MPV 8.1 6.0 - 12.0 fL    NRBC Automated NOT REPORTED per 100 WBC    Differential Type NOT REPORTED     Seg Neutrophils 86 (H) 36 - 66 %    Lymphocytes 8 (L) 24 - 44 %    Monocytes 6 2 - 11 %    Eosinophils % 0 (L) 1 - 4 %    Basophils 0 0 - 2 %    Immature Granulocytes NOT REPORTED 0 %    Segs Absolute 15.30 (H) 1.8 - 7.7 k/uL    Absolute Lymph # 1.40 1.0 - 4.8 k/uL    Absolute Mono # 1.00 0.1 - 1.2 k/uL    Absolute Eos # 0.00 0.0 - 0.4 k/uL    Basophils Absolute 0.10 0.0 - 0.2 k/uL    Absolute Immature Granulocyte NOT REPORTED 0.00 - 0.30 k/uL    WBC Morphology NOT REPORTED     RBC Morphology NOT REPORTED     Platelet Estimate NOT REPORTED    Basic Metabolic Panel   Result Value Ref Range    Glucose 173 (H) 70 - 99 mg/dL    BUN 32 (H) 8 - 23 mg/dL    CREATININE 1.00 (H) 0.50 - 0.90 mg/dL    Bun/Cre Ratio NOT REPORTED 9 - 20    Calcium 9.8 8.6 - 10.4 mg/dL    Sodium 140 135 - 144 mmol/L    Potassium 4.2 3.7 - 5.3 mmol/L    Chloride 103 98 - 107 mmol/L    CO2 24 20 - 31 mmol/L    Anion Gap 13 9 - 17 mmol/L    GFR Non-African American 53 (L) >60 mL/min    GFR African American >60 >60 mL/min    GFR Comment          GFR Staging NOT REPORTED    Troponin   Result Value Ref Range    Troponin, High Sensitivity 11 0 - 14 ng/L    Troponin T NOT REPORTED <0.03 ng/mL    Troponin Interp NOT REPORTED    Brain Natriuretic Peptide   Result Value Ref Range    Pro-BNP 1,296 (H) <300 pg/mL    BNP Interpretation NOT REPORTED    Protime-INR   Result Value Ref Range    Protime 10.1 9.4 - 12.6 sec    INR 1.0        Not indicated unless otherwise documented above    RADIOLOGY:   I reviewed the radiologist interpretations:  XR CHEST PORTABLE   Final Result   Mild cardiomegaly and pulmonary vascular congestion new since previous. Changes of COPD. Not indicated unless otherwise documented above    EMERGENCY DEPARTMENT COURSE:     The patient was given the following medications:  Orders Placed This Encounter   Medications    furosemide (LASIX) injection 20 mg        Vitals:    Vitals:    12/20/21 0411 12/20/21 0510   BP: 124/82 117/63   Pulse: 116 107   Resp: 26 22   Temp: 97.6 °F (36.4 °C)    TempSrc: Oral    SpO2: 90% 94%   Weight: 54.4 kg (120 lb)    Height: 4' 11\" (1.499 m)      -------------------------  /63   Pulse 107   Temp 97.6 °F (36.4 °C) (Oral)   Resp 22   Ht 4' 11\" (1.499 m)   Wt 54.4 kg (120 lb)   SpO2 94%   BMI 24.24 kg/m²         I have reviewed the disposition diagnosis with the patient and or their family/guardian. I have answered their questions and given discharge instructions.  They voiced understanding of these instructions and did not have any further questions or complaints. CRITICAL CARE:    None    CONSULTS:    None    PROCEDURES:    None      OARRS Report if indicated             FINAL IMPRESSION      1. Congestive heart failure, unspecified HF chronicity, unspecified heart failure type ([facility] [address] [facility]    2. Acute respiratory distress          DISPOSITION/PLAN   DISPOSITION Decision To Admit    I have reviewed the disposition diagnosis with the patient and or their family/guardian. I have answered their questions and given discharge instructions. They voiced understanding of these instructions and did not have any further questions or complaints. Reevaluation: Given the patient's respiratory status and need for oxygen we are admitting her to the hospital with a diagnosis of CHF exacerbation hypoxemia and cardiac arrhythmia. I talked to [facility] for the admission. PATIENT REFERRED TO:  No follow-up provider specified.     DISCHARGE MEDICATIONS:  New Prescriptions    No medications on file       (Please note that portions of this note were completed with a voice recognition program.  Efforts were made to edit the dictations but occasionally words are mis-transcribed.)    Licha Murcia MD,  Attending Emergency Physician            Licha Murcia MD  12/20/21 6868

## 2021-12-20 NOTE — H&P
[facility]  Office: [address], DO, Dominique Lam, DO, Shundean Juan Diego, DO, Alida Sabeli Blood, DO, Darryl Pena MD, Perez Damon MD, Tammi Aguilar MD, Ramya Mccarthy MD, Maria Teresa Anderson MD, Braulio Correa MD, Horace Rodriguez MD, Clarke Ramirez, DO, Zainab Fisher, DO, Bernarda Morales MD,  Meenakshi Friday, DO, Diane Daniels MD, Arcelia Corbin MD, Yuliet Mendoza MD, Ayanna Ahn MD, Zuleima Shi MD, Donovan Segundo MD, Adeline Goldmann, MD, Adwoa Carpio, [facility], [facility], [facility], Ash Nurse, CNP, Pedro Moise, CNS, Myriam Arora, CNP, Valeria Ford, CNP, Nicolasa Rodriguez, CNP, Lorie Whelan, CNP, Pura Camara, CNP, Fabian Alston PA-C, Neeta Mccann, DNP, Levi Carcamo, DNP, Nehemiah Frank, CNP, July Troy, CNP, Cathryn Squibb, CNP, Roseamry Garcia, CNP, Nain Schroeder, CNP, [facility], [facility]         IN-PATIENT SERVICE  History and Physical  [facility] - INPATIENT          Name: Dolores Shelley  MRN: [id number]     Acct: [de-identified]  Room: 2008/2008-02    Admit Date: 12/20/2021  PCP: Esha Cole MD    Chief Complaint:  Chief Complaint   Patient presents with    Shortness of Breath    Atrial Fibrillation        History of Present Illness:  Dolores Shelley is a 80 y.o.  female who presents with Shortness of Breath and Atrial Fibrillation    Patient was admitted thru ER with:  \" Dolores Shelley is a 80 y.o. female who presents emergency department as a private walk-in with complaints of shortness of breath and a rapid irregular heartbeat. Patient does have a history of congestive heart failure and atrial fibrillation. She also has a COPD patient and smoked up until recently. She has had a history of a cardioversion in the past.  She denies chest pain at this point in time. She denies nausea vomiting or diaphoresis.  \"     The patient reports that she is responding to treatment  She is less short of breath  No chest pain    Initial database has included:  WBC22.0 High k/uL RBC4.63m/uL polyethylene glycol (GLYCOLAX) 17 g packet Take 17 g by mouth daily   Yes Historical Provider, MD   bisacodyl (DULCOLAX) 10 MG suppository Place 10 mg rectally daily as needed for Constipation   Yes Historical Provider, MD   clotrimazole-betamethasone (LOTRISONE) 1-0.05 % lotion Apply topically 2 times daily   Yes Historical Provider, MD   oxyCODONE-acetaminophen (PERCOCET) 5-325 MG per tablet Take 1 tablet by mouth every 8 hours as needed for Pain. Yes Historical Provider, MD   spironolactone (ALDACTONE) 25 MG tablet Take 25 mg by mouth daily    Yes Historical Provider, MD   dilTIAZem (CARDIZEM CD) 180 MG extended release capsule Take 180 mg by mouth daily    Yes Historical Provider, MD         Allergies:   Gabapentin, Keflex [cephalexin], Tegretol [carbamazepine], Adhesive tape, Demerol hcl [meperidine], and Flagyl [metronidazole]    Social Hx:  Tobacco:    reports that she quit smoking about 15 years ago. Her smoking use included cigarettes. She has never used smokeless tobacco.  Alcohol:      reports no history of alcohol use. Drug Use:  reports no history of drug use. Family Hx; Family History   Family history unknown: Yes       ROS:  Review of Systems   Constitutional: Positive for activity change (Diminished) and fatigue (Decreased exercise capacity). Negative for appetite change, chills, diaphoresis and fever. HENT: Negative for congestion and nosebleeds. Eyes: Negative for photophobia and visual disturbance. Respiratory: Positive for shortness of breath (Dyspnea on exertion). Negative for cough and wheezing. Cardiovascular: Positive for palpitations (\"Irregular heartbeat\"). Negative for chest pain. Gastrointestinal: Positive for constipation (She is requesting a suppository). Negative for abdominal distention, blood in stool, nausea and vomiting. Genitourinary: Negative for flank pain and hematuria. Musculoskeletal: Negative for arthralgias and myalgias.    Skin: Negative for rash % 0 (L) 1 - 4 %    Basophils 0 0 - 2 %    Immature Granulocytes NOT REPORTED 0 %    Segs Absolute 15.30 (H) 1.8 - 7.7 k/uL    Absolute Lymph # 1.40 1.0 - 4.8 k/uL    Absolute Mono # 1.00 0.1 - 1.2 k/uL    Absolute Eos # 0.00 0.0 - 0.4 k/uL    Basophils Absolute 0.10 0.0 - 0.2 k/uL    Absolute Immature Granulocyte NOT REPORTED 0.00 - 0.30 k/uL    WBC Morphology NOT REPORTED     RBC Morphology NOT REPORTED     Platelet Estimate NOT REPORTED    Basic Metabolic Panel    Collection Time: 12/20/21  4:26 AM   Result Value Ref Range    Glucose 173 (H) 70 - 99 mg/dL    BUN 32 (H) 8 - 23 mg/dL    CREATININE 1.00 (H) 0.50 - 0.90 mg/dL    Bun/Cre Ratio NOT REPORTED 9 - 20    Calcium 9.8 8.6 - 10.4 mg/dL    Sodium 140 135 - 144 mmol/L    Potassium 4.2 3.7 - 5.3 mmol/L    Chloride 103 98 - 107 mmol/L    CO2 24 20 - 31 mmol/L    Anion Gap 13 9 - 17 mmol/L    GFR Non-African American 53 (L) >60 mL/min    GFR African American >60 >60 mL/min    GFR Comment          GFR Staging NOT REPORTED    Troponin    Collection Time: 12/20/21  4:26 AM   Result Value Ref Range    Troponin, High Sensitivity 11 0 - 14 ng/L    Troponin T NOT REPORTED <0.03 ng/mL    Troponin Interp NOT REPORTED    Brain Natriuretic Peptide    Collection Time: 12/20/21  4:26 AM   Result Value Ref Range    Pro-BNP 1,296 (H) <300 pg/mL    BNP Interpretation NOT REPORTED    Protime-INR    Collection Time: 12/20/21  4:26 AM   Result Value Ref Range    Protime 10.1 9.4 - 12.6 sec    INR 1.0    COVID-19, Rapid    Collection Time: 12/20/21  4:28 AM    Specimen: Nasopharyngeal Swab   Result Value Ref Range    Specimen Description . NASOPHARYNGEAL SWAB     SARS-CoV-2, Rapid Not Detected Not Detected   EKG 12 Lead    Collection Time: 12/20/21  4:35 AM   Result Value Ref Range    Ventricular Rate 108 BPM    Atrial Rate 60 BPM    P-R Interval 168 ms    QRS Duration 128 ms    Q-T Interval 404 ms    QTc Calculation (Bazett) 541 ms    R Axis -63 degrees    T Axis -24 degrees Culture, Blood 1    Collection Time: 12/20/21  4:36 AM    Specimen: Blood   Result Value Ref Range    Specimen Description . BLOOD     Special Requests 20CC LEFT FOREARM     Culture NO GROWTH <24 HRS    Culture, Blood 2    Collection Time: 12/20/21  9:00 AM    Specimen: Blood   Result Value Ref Range    Specimen Description . BLOOD     Special Requests RIGHT ANTECUBE 20CC     Culture NO GROWTH <24 HRS    CBC    Collection Time: 12/20/21  9:06 AM   Result Value Ref Range    WBC 22.0 (H) 3.5 - 11.0 k/uL    RBC 4.63 4.0 - 5.2 m/uL    Hemoglobin 14.9 12.0 - 16.0 g/dL    Hematocrit 44.0 36 - 46 %    MCV 95.2 80 - 100 fL    MCH 32.1 26 - 34 pg    MCHC 33.8 31 - 37 g/dL    RDW 13.8 12.5 - 15.4 %    Platelets 337 506 - 808 k/uL    MPV 8.3 6.0 - 12.0 fL    NRBC Automated NOT REPORTED per 100 WBC   Lactate, Sepsis    Collection Time: 12/20/21  9:06 AM   Result Value Ref Range    Lactic Acid, Sepsis 1.0 0.5 - 1.9 mmol/L    Lactic Acid, Sepsis, Whole Blood NOT REPORTED 0.5 - 1.9 mmol/L       Imaging/Diagnostics:  XR CHEST PORTABLE    Result Date: 12/20/2021  Mild cardiomegaly and pulmonary vascular congestion new since previous. Changes of COPD.      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 [Z95.0]     COPD without exacerbation ([facility] [address] [J44.9]     Acute on chronic diastolic congestive heart failure (HCC) [I50.33]     H/O blood clots [Z86.718]        Plan:  Admit  Optimize cardio-pulmonary function  Respiratory Therapy and Bronchodilators prn  Oxygen  Rate control  Continue Eliquis  Laxatives as needed for constipation  Anxiolytics  DVT prophylaxis      Patient is admitted as inpatient status because of co-morbidities listed above, severity of signs and symptoms as outlined, requirement for current medical therapies and most importantly because of direct risk to patient if care not provided in a hospital setting. Expected length of stay > 48 hours.      Consultations:   IP CONSULT TO DIETITIAN  IP CONSULT TO HEART FAILURE NURSE/COORDINATOR    Electronically signed by Nadia Metcalf DO on 12/20/2021 at 6:56 PM

## 2021-12-20 NOTE — ED TRIAGE NOTES
Pt to ED from home with c/o shortness of breath and atrial fibrillation. Onset of symptoms x9 hours ago. Also reports not being able to take inhaler or use prescribed   oxygen (2L) due to displacement. Pt reports calling 911 several times tonight for symptoms. Denies chest pain, abd pain, diarrhea, or vomiting. Physician at the bedside. Labs drawn and sent. Covid specimen collected and sent.

## 2021-12-20 NOTE — PROGRESS NOTES
Transitions of Care Pharmacy Service   Medication Review    The patient's list of current home medications has been reviewed and updated. Source(s) of information: patient, [place], Care Everywhere, OARRS      PROVIDER ACTION REQUESTED  Medications that need to be addressed by a physician/nurse practitioner:    Medication Action Requested       Diltiazem CD 300mg,  Miralax daily prn   Home doses are different --  please adjust as appropriate:     Diltiazem CD 180mg daily   Miralax powder daily      Meds added to list need physician review (Zolpidem, Diazepam, etc)           Please feel free to call me with any questions about this encounter. Thank you. Kurtis Patrick, [facility]   Transitions of Care Pharmacy Service  Phone:  [phone]  Fax: [phone]      Electronically signed by Kurtis Patrick [facility] on 12/20/2021 at 5:12 PM           Medications Prior to Admission:   apixaban (ELIQUIS) 2.5 MG TABS tablet, Take 2.5 mg by mouth 2 times daily   furosemide (LASIX) 20 MG tablet, Take 20 mg by mouth daily as needed  metoprolol tartrate (LOPRESSOR) 25 MG tablet, Take 12.5 mg by mouth 2 times daily 1/2 tab (=12.5mg) BID  dicyclomine (BENTYL) 10 MG capsule, Take 10 mg by mouth every 8 hours  zolpidem (AMBIEN) 10 MG tablet, Take 5-10 mg by mouth nightly as needed for Sleep.  diazePAM (VALIUM) 5 MG tablet, Take 5 mg by mouth 3 times daily as needed for Anxiety. polyethylene glycol (GLYCOLAX) 17 g packet, Take 17 g by mouth daily  bisacodyl (DULCOLAX) 10 MG suppository, Place 10 mg rectally daily as needed for Constipation  clotrimazole-betamethasone (LOTRISONE) 1-0.05 % lotion, Apply topically 2 times daily  oxyCODONE-acetaminophen (PERCOCET) 5-325 MG per tablet, Take 1 tablet by mouth every 8 hours as needed for Pain.    spironolactone (ALDACTONE) 25 MG tablet, Take 25 mg by mouth daily   dilTIAZem (CARDIZEM CD) 180 MG extended release capsule, Take 180 mg by mouth daily

## 2021-12-20 NOTE — PROGRESS NOTES
Occupational [address]  Occupational Therapy Not Seen Note    Patient not available for Occupational Therapy due to:    [] Testing:    [] Hemodialysis    [] Cancelled by RN:    []Refusal by Patient:    [] Surgery:     [] Intubation:     [] Pain Medication:    [] Sedation:     [] Spine Precautions :    [] Medical Instability:    [x] Other: 12/20: (CX) Writer attempted to see pt this date, pt refusing to participate in therapy eval saying \"Well I am not walking. \"  Cheikh Valdez returned later this afternoon after PCT said pt was agreeable. Pt soundly sleeping upon ck back. OT will continue to follow and ck back as time allows or 12/21/21.       Karina Tapia, OT

## 2021-12-20 NOTE — PROGRESS NOTES
Physical Therapy  DATE: 2021    NAME: Torie Ferguson  MRN: [id number]   : 1935    Patient not seen this date for Physical Therapy due to:      [] Cancel by RN or physician due to:    [] Hemodialysis    [] Critical Lab Value Level     [] Blood transfusion in progress    [] Acute or unstable cardiovascular status   _MAP < 55 or more than >115  _HR < 40 or > 130    [] Acute or unstable pulmonary status   -FiO2 > 60%   _RR < 5 or >40    _O2 sats < 85%    [] Strict Bedrest    [] Off Unit for surgery or procedure    [] Off Unit for testing       [] Pending imaging to R/O fracture    [x] Refusal by Patient:  Writer attempted to see pt this date, pt refusing to participate in therapy eval saying \"Well I am not walking. \"  Karen Pro returned later this afternoon after PCT said pt was agreeable. Pt soundly sleeping upon ck back. PT will continue to follow and ck back as time allows or 21. [] Other      [] PT being discontinued at this time. Patient independent. No further needs. [] PT being discontinued at this time as the patient has been transferred to hospice care. No further needs.       Luisito Barry, PT

## 2021-12-20 NOTE — ED NOTES
Pt placed on bedpan. Pt continent of urine. Urine specimen collected. Pericare provided. PureWick put in place.      Kenya Pimentel RN  12/20/21 1690

## 2021-12-21 ENCOUNTER — APPOINTMENT (OUTPATIENT)
Dept: GENERAL RADIOLOGY | Age: 86
DRG: 292 | End: 2021-12-21
Payer: COMMERCIAL

## 2021-12-21 PROBLEM — E44.1 MILD MALNUTRITION (HCC): Status: ACTIVE | Noted: 2021-12-21

## 2021-12-21 LAB
ANION GAP SERPL CALCULATED.3IONS-SCNC: 16 MMOL/L (ref 9–17)
BNP INTERPRETATION: ABNORMAL
BUN BLDV-MCNC: 37 MG/DL (ref 8–23)
BUN/CREAT BLD: 27 (ref 9–20)
CALCIUM SERPL-MCNC: 9.2 MG/DL (ref 8.6–10.4)
CHLORIDE BLD-SCNC: 100 MMOL/L (ref 98–107)
CHOLESTEROL/HDL RATIO: 4.5
CHOLESTEROL: 165 MG/DL
CO2: 26 MMOL/L (ref 20–31)
CREAT SERPL-MCNC: 1.36 MG/DL (ref 0.5–0.9)
GFR AFRICAN AMERICAN: 45 ML/MIN
GFR NON-AFRICAN AMERICAN: 37 ML/MIN
GFR SERPL CREATININE-BSD FRML MDRD: ABNORMAL ML/MIN/{1.73_M2}
GFR SERPL CREATININE-BSD FRML MDRD: ABNORMAL ML/MIN/{1.73_M2}
GLUCOSE BLD-MCNC: 89 MG/DL (ref 70–99)
HCT VFR BLD CALC: 41.3 % (ref 36.3–47.1)
HDLC SERPL-MCNC: 37 MG/DL
HEMOGLOBIN: 13.4 G/DL (ref 11.9–15.1)
LDL CHOLESTEROL: 111 MG/DL (ref 0–130)
LV EF: 55 %
LVEF MODALITY: NORMAL
MAGNESIUM: 2 MG/DL (ref 1.6–2.6)
MCH RBC QN AUTO: 31 PG (ref 25.2–33.5)
MCHC RBC AUTO-ENTMCNC: 32.4 G/DL (ref 28.4–34.8)
MCV RBC AUTO: 95.6 FL (ref 82.6–102.9)
NRBC AUTOMATED: 0 PER 100 WBC
PDW BLD-RTO: 12.7 % (ref 11.8–14.4)
PLATELET # BLD: 248 K/UL (ref 138–453)
PMV BLD AUTO: 9.6 FL (ref 8.1–13.5)
POTASSIUM SERPL-SCNC: 4.1 MMOL/L (ref 3.7–5.3)
PRO-BNP: 1885 PG/ML
RBC # BLD: 4.32 M/UL (ref 3.95–5.11)
SODIUM BLD-SCNC: 142 MMOL/L (ref 135–144)
THYROXINE, FREE: 1.26 NG/DL (ref 0.93–1.7)
TRIGL SERPL-MCNC: 84 MG/DL
TSH SERPL DL<=0.05 MIU/L-ACNC: 0.27 MIU/L (ref 0.3–5)
VLDLC SERPL CALC-MCNC: ABNORMAL MG/DL (ref 1–30)
WBC # BLD: 8.7 K/UL (ref 3.5–11.3)

## 2021-12-21 PROCEDURE — 2700000000 HC OXYGEN THERAPY PER DAY

## 2021-12-21 PROCEDURE — 93306 TTE W/DOPPLER COMPLETE: CPT

## 2021-12-21 PROCEDURE — 2580000003 HC RX 258: Performed by: INTERNAL MEDICINE

## 2021-12-21 PROCEDURE — 80061 LIPID PANEL: CPT

## 2021-12-21 PROCEDURE — 83880 ASSAY OF NATRIURETIC PEPTIDE: CPT

## 2021-12-21 PROCEDURE — 97530 THERAPEUTIC ACTIVITIES: CPT

## 2021-12-21 PROCEDURE — 6370000000 HC RX 637 (ALT 250 FOR IP): Performed by: INTERNAL MEDICINE

## 2021-12-21 PROCEDURE — 97162 PT EVAL MOD COMPLEX 30 MIN: CPT

## 2021-12-21 PROCEDURE — 94640 AIRWAY INHALATION TREATMENT: CPT

## 2021-12-21 PROCEDURE — 6370000000 HC RX 637 (ALT 250 FOR IP): Performed by: NURSE PRACTITIONER

## 2021-12-21 PROCEDURE — 97166 OT EVAL MOD COMPLEX 45 MIN: CPT

## 2021-12-21 PROCEDURE — 71046 X-RAY EXAM CHEST 2 VIEWS: CPT

## 2021-12-21 PROCEDURE — 99232 SBSQ HOSP IP/OBS MODERATE 35: CPT | Performed by: INTERNAL MEDICINE

## 2021-12-21 PROCEDURE — 97535 SELF CARE MNGMENT TRAINING: CPT

## 2021-12-21 PROCEDURE — 36415 COLL VENOUS BLD VENIPUNCTURE: CPT

## 2021-12-21 PROCEDURE — 6360000002 HC RX W HCPCS: Performed by: NURSE PRACTITIONER

## 2021-12-21 PROCEDURE — 97116 GAIT TRAINING THERAPY: CPT

## 2021-12-21 PROCEDURE — 84443 ASSAY THYROID STIM HORMONE: CPT

## 2021-12-21 PROCEDURE — 80048 BASIC METABOLIC PNL TOTAL CA: CPT

## 2021-12-21 PROCEDURE — 1200000000 HC SEMI PRIVATE

## 2021-12-21 PROCEDURE — 85027 COMPLETE CBC AUTOMATED: CPT

## 2021-12-21 PROCEDURE — 83735 ASSAY OF MAGNESIUM: CPT

## 2021-12-21 PROCEDURE — 94761 N-INVAS EAR/PLS OXIMETRY MLT: CPT

## 2021-12-21 PROCEDURE — 84439 ASSAY OF FREE THYROXINE: CPT

## 2021-12-21 RX ORDER — IPRATROPIUM BROMIDE AND ALBUTEROL SULFATE 2.5; .5 MG/3ML; MG/3ML
1 SOLUTION RESPIRATORY (INHALATION) 2 TIMES DAILY
Status: DISCONTINUED | OUTPATIENT
Start: 2021-12-21 | End: 2021-12-23 | Stop reason: HOSPADM

## 2021-12-21 RX ORDER — FUROSEMIDE 10 MG/ML
20 INJECTION INTRAMUSCULAR; INTRAVENOUS 2 TIMES DAILY
Status: DISCONTINUED | OUTPATIENT
Start: 2021-12-21 | End: 2021-12-22

## 2021-12-21 RX ORDER — TETRAHYDROZOLINE HCL 0.05 %
2 DROPS OPHTHALMIC (EYE) 3 TIMES DAILY
Status: DISCONTINUED | OUTPATIENT
Start: 2021-12-21 | End: 2021-12-23 | Stop reason: HOSPADM

## 2021-12-21 RX ADMIN — FUROSEMIDE 20 MG: 10 INJECTION, SOLUTION INTRAMUSCULAR; INTRAVENOUS at 09:53

## 2021-12-21 RX ADMIN — SODIUM CHLORIDE, PRESERVATIVE FREE 10 ML: 5 INJECTION INTRAVENOUS at 09:53

## 2021-12-21 RX ADMIN — IPRATROPIUM BROMIDE AND ALBUTEROL SULFATE 1 AMPULE: .5; 2.5 SOLUTION RESPIRATORY (INHALATION) at 19:40

## 2021-12-21 RX ADMIN — SODIUM CHLORIDE, PRESERVATIVE FREE 10 ML: 5 INJECTION INTRAVENOUS at 20:57

## 2021-12-21 RX ADMIN — APIXABAN 2.5 MG: 2.5 TABLET, FILM COATED ORAL at 09:52

## 2021-12-21 RX ADMIN — DILTIAZEM HYDROCHLORIDE 300 MG: 180 CAPSULE, COATED, EXTENDED RELEASE ORAL at 09:52

## 2021-12-21 RX ADMIN — FUROSEMIDE 20 MG: 10 INJECTION, SOLUTION INTRAMUSCULAR; INTRAVENOUS at 17:10

## 2021-12-21 RX ADMIN — DIAZEPAM 5 MG: 5 TABLET ORAL at 22:22

## 2021-12-21 RX ADMIN — TETRAHYDROZOLINE HCL 2 DROP: 0.05 SOLUTION/ DROPS OPHTHALMIC at 17:10

## 2021-12-21 RX ADMIN — POLYETHYLENE GLYCOL 3350 17 G: 17 POWDER, FOR SOLUTION ORAL at 10:11

## 2021-12-21 RX ADMIN — APIXABAN 2.5 MG: 2.5 TABLET, FILM COATED ORAL at 20:57

## 2021-12-21 RX ADMIN — METOPROLOL SUCCINATE 25 MG: 25 TABLET, EXTENDED RELEASE ORAL at 09:52

## 2021-12-21 RX ADMIN — IPRATROPIUM BROMIDE AND ALBUTEROL SULFATE 1 AMPULE: .5; 2.5 SOLUTION RESPIRATORY (INHALATION) at 08:07

## 2021-12-21 RX ADMIN — SPIRONOLACTONE 25 MG: 25 TABLET, FILM COATED ORAL at 09:52

## 2021-12-21 ASSESSMENT — PAIN SCALES - GENERAL
PAINLEVEL_OUTOF10: 0
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
## 2021-12-21 NOTE — PROGRESS NOTES
[facility]  Office: [address], DO, Odalys Doty, DO, Janie Faith, DO, Jessica Thaddeus Blood, DO, Nasreen Dimas MD, Portia Guzman MD, Richar Correa MD, Ronnie Leigh MD, Chriag Betancourt MD, Jose Fan MD, Chaz Friedman MD, Giselle Nichols, DO, Torie Mcnally, DO, Jaleel Kilgore MD,  Raad Bowman, DO, Jen Sarabia MD, Sesar Dye MD, Brenden Palmer MD, Dony Pugh MD, Patsy Ramirez MD, Josie Fernandez MD, Jessica Graham MD, Deshawn Tay [facility], [facility], CNP, Thai Paniagua, CNP, Miller , CNS, Jeannine Pa, CNP, Sophia Humphrey, CNP, Opal Bolaños, CNP, Francisco Woodruff, CNP, Andry Simmons, CNP, DARREN NewbyC, Rosie Nielson, DNP, Ramona Parham, [facility], [place], Wil Cheung, CNP, Keegan De La Rosa, CNP, Mana Fajardo, CNP, Arcelia Horn, CNP, Dm Fournier, CNP, Mary Kay Mcintyre [facility]    Progress Note    12/21/2021    4:16 PM    Name:   Rita Serrano  MRN:     [id number]     Acct:      [de-identified]   Room:   2008/2008-02  IP Day:  1  Admit Date:  12/20/2021  4:10 AM    PCP:   Welton Burkitt, MD  Code Status:  Full Code    Subjective:     C/C:   Chief Complaint   Patient presents with    Shortness of Breath    Atrial Fibrillation     Interval History Status:    Shortness of breath is better after IV diuresis  Blood pressure 93/63, heart rate 85, saturating 96% on 2 L oxygen via nasal cannula  Requesting for MiraLAX and eyedrops  Brief History:   Rita Serrano is a 80 y.o.  female who presents with Shortness of Breath and Atrial Fibrillation     Patient was admitted thru ER with:  \" Natasha Walls is a 80 y.o. female who presents emergency department as a private walk-in with complaints of shortness of breath and a rapid irregular heartbeat.  Patient does have a history of congestive heart failure and atrial fibrillation.  She also has a COPD patient and smoked up until recently. Ene Clark has had a history of a PRN Meds: sodium chloride, acetaminophen **OR** acetaminophen, ondansetron **OR** ondansetron, polyethylene glycol, sodium chloride flush, bisacodyl, ipratropium-albuterol, diazePAM, polyvinyl alcohol    Data:     Past Medical History:   has a past medical history of A-fib ([facility] Atrial fibrillation ([facility] CHF (congestive heart failure) ([facility] COPD (chronic obstructive pulmonary disease) ([facility] Diverticulosis, TIMOTEO (generalized anxiety disorder), H/O blood clots, History of cardioversion, Liver cyst, Macular degeneration, Pneumonia, Rectocele, and Stage 3b chronic kidney disease ([facility] Social History:   reports that she quit smoking about 15 years ago. Her smoking use included cigarettes. She has never used smokeless tobacco. She reports that she does not drink alcohol and does not use drugs. Family History:   Family History   Family history unknown: Yes       Vitals:  BP 93/63   Pulse 85   Temp 97.7 °F (36.5 °C) (Oral)   Resp 16   Ht 4' 11\" (1.499 m)   Wt 110 lb (49.9 kg)   SpO2 96%   BMI 22.22 kg/m²   Temp (24hrs), Av.7 °F (36.5 °C), Min:97.2 °F (36.2 °C), Max:98.2 °F (36.8 °C)    No results for input(s): POCGLU in the last 72 hours. I/O (24Hr):     Intake/Output Summary (Last 24 hours) at 2021 1616  Last data filed at 2021 1538  Gross per 24 hour   Intake 990 ml   Output 1 ml   Net 989 ml       Labs:  Hematology:  Recent Labs     21  0426 21  0906 21  0632   WBC 17.9* 22.0* 8.7   RBC 4.75 4.63 4.32   HGB 15.0 14.9 13.4   HCT 45.5 44.0 41.3   MCV 95.7 95.2 95.6   MCH 31.5 32.1 31.0   MCHC 32.9 33.8 32.4   RDW 14.1 13.8 12.7    202 248   MPV 8.1 8.3 9.6   INR 1.0  --   --      Chemistry:  Recent Labs     21  0426 21  0632    142   K 4.2 4.1    100   CO2 24 26   GLUCOSE 173* 89   BUN 32* 37*   CREATININE 1.00* 1.36*   MG  --  2.0   ANIONGAP 13 16   LABGLOM 53* 37*   GFRAA >60 45*   CALCIUM 9.8 9.2   PROBNP 1,296* 1,885*   TROPHS 11 --      Recent Labs     12/21/21  0632   TSH 0.27*   CHOL 165   HDL 37*   LDLCHOLESTEROL 111   CHOLHDLRATIO 4.5   TRIG 84   VLDL NOT REPORTED     ABG:No results found for: POCPH, PHART, PH, POCPCO2, DDY3EYE, PCO2, POCPO2, PO2ART, PO2, POCHCO3, WVF8EWY, HCO3, NBEA, PBEA, BEART, BE, THGBART, THB, NJB3CVZ, NPNU6BQG, Z8HXHEVG, O2SAT, FIO2  Lab Results   Component Value Date/Time    SPECIAL RIGHT ANTECUBE 20CC 12/20/2021 09:00 AM     Lab Results   Component Value Date/Time    CULTURE NO GROWTH 1 DAY 12/20/2021 09:00 AM       Radiology:  XR CHEST (2 VW)    Result Date: 12/21/2021  No significant change     XR CHEST PORTABLE    Result Date: 12/20/2021  Mild cardiomegaly and pulmonary vascular congestion new since previous. Changes of COPD. Physical Examination:        General appearance:  alert, cooperative and no distress  Mental Status:  oriented to person, place and time and anxious affect  Lungs: Diminished breath sounds at bases, few bibasilar Rales  Heart: Irregular rhythm, no murmur,+ pacemaker  Abdomen:  soft, nontender, nondistended, normal bowel sounds, no masses, hepatomegaly, splenomegaly  Extremities:  no edema, redness, tenderness in the calves  Skin:  no gross lesions, rashes, induration    Assessment:        Hospital Problems           Last Modified POA    * (Principal) Congestive heart failure with unknown left ventricular ejection fraction ([address] 12/20/2021 Yes    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          COPD without exacerbation ([address] 12/20/2021 Yes    Acute on chronic diastolic congestive heart failure ([address] 12/20/2021 Yes    Stage 3b chronic kidney disease ([address] 12/20/2021 Yes    TIMOTEO (generalized anxiety disorder) 12/20/2021 Yes    Acute respiratory distress 12/20/2021 Yes    Chronic anticoagulation 12/20/2021 Yes    Mild malnutrition ([facility] 12/21/2021 Yes          Plan:        1. Continue IV diuresis with Lasix 20 mg 2 times daily  2. Renew Toprol-XL  3. Hold lisinopril due to blood pressure being low  4. Continue home dose of Eliquis  5. MiraLAX as needed  6. Low-sodium diet  7.  Clear eyedrops    Gibson Combs MD  12/21/2021  4:16 PM

## 2021-12-21 NOTE — PLAN OF CARE
Problem: Falls - Risk of:  Goal: Will remain free from falls  Description: Will remain free from falls  12/21/2021 1213 by Geeta Mcmillan RN  Outcome: Ongoing  Note: Patient is a fall risk during this admission. Fall risk assessment was performed. Patient is absent of falls. Bed is in the lowest position. Wheels on the bed are locked. Call light and bed side table are within reach. Clutter is removed. Patient was educated to call out when needing assistance or wanting to get out of bed. Patient offered toileting assistance during rounding. Hourly rounds have been performed. Problem: OXYGENATION/RESPIRATORY FUNCTION  Goal: Patient will achieve/maintain normal respiratory rate/effort  Description: Respiratory rate and effort will be within normal limits for the patient  12/21/2021 1213 by Geeta Mcmillan RN  Outcome: Ongoing  Note: Patients respiratory status stable at this time. No signs or symptoms of distress noted. Respirations non-labored and regular. Nasal canula 02 in place at 2 liters. Continuous SpO2 monitoring in place. Oxygen levels remain stable at 93%. Patient denies any shortness of breath at rest, minimal with activity.       Problem: HEMODYNAMIC STATUS  Goal: Patient has stable vital signs and fluid balance  12/21/2021 1213 by Geeta Mcmillan RN  Outcome: Ongoing     Problem: FLUID AND ELECTROLYTE IMBALANCE  Goal: Fluid and electrolyte balance are achieved/maintained  12/21/2021 1213 by Geeta Mcmillan RN  Outcome: Ongoing     Problem: ACTIVITY INTOLERANCE/IMPAIRED MOBILITY  Goal: Mobility/activity is maintained at optimum level for patient  12/21/2021 1213 by Geeta Mcmillan RN  Outcome: Ongoing     Problem: Pain:  Goal: Pain level will decrease  Description: Pain level will decrease  Outcome: Ongoing     Problem: Pain:  Goal: Control of chronic pain  Description: Control of chronic pain  Outcome: Ongoing

## 2021-12-21 NOTE — PLAN OF CARE
Problem: Falls - Risk of:  Goal: Will remain free from falls  Description: Will remain free from falls  12/21/2021 0325 by Serina Bowman RN  Outcome: Ongoing  12/20/2021 1823 by Ángela Andres RN  Outcome: Ongoing  Goal: Absence of physical injury  Description: Absence of physical injury  12/21/2021 0325 by Serina Bowman RN  Outcome: Ongoing  12/20/2021 1823 by Ángela Andres RN  Outcome: Ongoing     Problem: OXYGENATION/RESPIRATORY FUNCTION  Goal: Patient will maintain patent airway  12/21/2021 0325 by Serina Bowman RN  Outcome: Ongoing  12/20/2021 1823 by Ángela Andres RN  Outcome: Ongoing  Goal: Patient will achieve/maintain normal respiratory rate/effort  Description: Respiratory rate and effort will be within normal limits for the patient  12/21/2021 0325 by Serina Bowman RN  Outcome: Ongoing  12/20/2021 1823 by Ángela Andres RN  Outcome: Ongoing     Problem: HEMODYNAMIC STATUS  Goal: Patient has stable vital signs and fluid balance  12/21/2021 0325 by Serina Bowman RN  Outcome: Ongoing  12/20/2021 1823 by Ángela Andres RN  Outcome: Ongoing     Problem: FLUID AND ELECTROLYTE IMBALANCE  Goal: Fluid and electrolyte balance are achieved/maintained  12/21/2021 0325 by Serina Bowman RN  Outcome: Ongoing  12/20/2021 1823 by Ángela Andres RN  Outcome: Ongoing     Problem: ACTIVITY INTOLERANCE/IMPAIRED MOBILITY  Goal: Mobility/activity is maintained at optimum level for patient  12/21/2021 0325 by Serina Bowman RN  Outcome: Ongoing  12/20/2021 1823 by Ángela Andres RN  Outcome: Ongoing

## 2021-12-21 NOTE — PROGRESS NOTES
Physical Therapy    Facility/Department: [facility]  Initial Assessment    NAME: Verna Hinson  : 1935  MRN: [id number]    Date of Service: 2021   TAMERA Velasquez reports patient is medically stable for therapy treatment this date. Chart reviewed prior to treatment and patient is agreeable for therapy. All lines intact and patient positioned comfortably at end of treatment. All patient needs addressed prior to ending therapy session. Per H&P:Natasha Walls is a 80 y.o. female who presents emergency department as a private walk-in with complaints of shortness of breath and a rapid irregular heartbeat.  Patient does have a history of congestive heart failure and atrial fibrillation.  She also has a COPD patient and smoked up until recently. Fernando Monroe has had a history of a cardioversion in the past.  She denies chest pain at this point in time.  She denies nausea vomiting or diaphoresis    Discharge Recommendations:  Due to recent hospitalization and medical condition, pt would benefit from additional therapy at time of discharge to ensure safety. Please refer to the AM-PAC score for current functional status. Patient would benefit from continued therapy after discharge      Assessment   Body structures, Functions, Activity limitations: Decreased strength;Decreased balance;Decreased endurance;Decreased safe awareness  Assessment: Pt tolerated PT eval well. Pt presenting w/ mild deficits in mobility, strength, and balance this date. Pt would benefit from continued skilled physical therapy to address these deficits in order to return to PLOF. Prognosis: Excellent  Decision Making: Medium Complexity  PT Education: Goals;PT Role;Plan of Care;General Safety; Energy Conservation  Patient Education: Pt educated on: importance of continued mobility throughout admission, purpose of acute PT eval, general safety awareness, pursed lip breathing, and PT POC. Pt verbalized fair understanding.   REQUIRES PT FOLLOW UP: Yes  Activity Tolerance  Activity Tolerance: Patient Tolerated treatment well       Patient Diagnosis(es): The primary encounter diagnosis was Congestive heart failure, unspecified HF chronicity, unspecified heart failure type ([facility] A diagnosis of Acute respiratory distress was also pertinent to this visit. has a past medical history of A-fib ([facility] Atrial fibrillation ([facility] CHF (congestive heart failure) ([facility] COPD (chronic obstructive pulmonary disease) ([facility] Diverticulosis, TIMOTEO (generalized anxiety disorder), H/O blood clots, History of cardioversion, Liver cyst, Macular degeneration, Pneumonia, Rectocele, and Stage 3b chronic kidney disease ([facility] has a past surgical history that includes ablation of dysrhythmic focus; Pacemaker insertion; Femur Surgery (Right); Appendectomy; Hysterectomy; and Cataract removal (Bilateral). Restrictions  Restrictions/Precautions  Restrictions/Precautions: General Precautions,Fall Risk  Required Braces or Orthoses?: No  Position Activity Restriction  Other position/activity restrictions: up with assist, telemetry, LUE IV, low sodium diet  Vision/Hearing  Vision: Impaired (Pt reports Macular Degeneration that is getting worse, R eye almost completely blind)  Vision Exceptions: Legally blind  Hearing: Within functional limits     Subjective  General  Patient assessed for rehabilitation services?: Yes  Response To Previous Treatment: Not applicable  Family / Caregiver Present: No  Follows Commands: Within Functional Limits  General Comment  Comments: RN and pt agreeable to therapy. Pt supine in bed upon arrival.  Pt pleasant and cooperative throughout. Subjective  Subjective: \"I'm feeling weak and sore. \"  Pain Screening  Patient Currently in Pain: Denies        Orientation  Orientation  Overall Orientation Status: Within Functional Limits  Social/Functional History  Social/Functional History  Lives With: Daughter (daughter is home at all times)  Type of Home: House  Home Layout: One level  Home Access: Stairs to enter with rails  Entrance Stairs - Number of Steps: 2  Entrance Stairs - Rails: Both  Bathroom Shower/Tub: Tub/Shower unit  Bathroom Toilet: Standard  Bathroom Equipment: Shower chair  Home Equipment: Oxygen (2L O2 continous)  ADL Assistance: Needs assistance (daughter supervises shower and helps in and out)  Homemaking Assistance: Needs assistance (daughter does all household work)  Homemaking Responsibilities: Yes  Ambulation Assistance: Independent (None)  Transfer Assistance: Independent  Active : No  Patient's  Info: daughter drives  Occupation: Retired  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  Leisure & Hobbies: Listening to TV (reports she cant do many of her hobbies d/t poor vision)  Additional Comments: Pt denies any recent falls  Cognition   Cognition  Overall Cognitive Status: Exceptions  Arousal/Alertness: Appropriate responses to stimuli  Following Commands: Follows multistep commands with repitition; Follows multistep commands with increased time  Attention Span: Appears intact  Memory: Appears intact  Safety Judgement: Decreased awareness of need for assistance;Decreased awareness of need for safety  Problem Solving: Decreased awareness of errors;Assistance required to identify errors made;Assistance required to correct errors made  Insights: Decreased awareness of deficits  Initiation: Does not require cues  Sequencing: Does not require cues    Objective     Observation/Palpation  Posture: Fair  Scar: R knee - previous surgery    AROM RLE (degrees)  RLE AROM: WFL  AROM LLE (degrees)  LLE AROM : WFL  AROM RUE (degrees)  RUE AROM : WFL  AROM LUE (degrees)  LUE AROM : WFL  Strength RLE  Strength RLE: WFL  Comment: Grossly 4-/5  Strength LLE  Strength LLE: WFL  Comment: Grossly 4-/5  Strength RUE  Comment: See OT assessment for detail  Strength LUE  Comment: See OT assessment for detail  Tone RLE  RLE Tone: Normotonic  Tone LLE  LLE Tone: Normotonic  Motor Control  Gross Motor?: WFL  Sensation  Overall Sensation Status: Impaired (sometimes fingers go numb with certain movements)  Bed mobility  Supine to Sit: Stand by assistance  Sit to Supine: Stand by assistance  Scooting: Stand by assistance  Comment: Pt w/o significant difficulty throughout bed mobility this date. Pt requiring min verbal cueing for use of bedrails for UE assist throughout w/ fair return demo. Upon sitting EOB, pt requiring mod verbal cueing to scoot hips forward in order to place feet on floor. Transfers  Sit to Stand: Contact guard assistance  Stand to sit: Contact guard assistance  Comment: Pt w/ fair steadiness throughout transfers this date requiring min verbal cueing for upright posture upon standing w/ good return demo. Pt w/ fair eccentric quad control noted throughout stand>sit transfers. Ambulation  Ambulation?: Yes  More Ambulation?: No  Ambulation 1  Surface: level tile  Device: No Device  Assistance: Contact guard assistance  Quality of Gait: fair steadiness  Gait Deviations: Slow Nadia; Shuffles  Distance: 15ft  Comments: Pt ambulating w/ fair steadiness throughout session this date w/o significant gait deviations.   Stairs/Curb  Stairs?: No     Balance  Posture: Fair  Sitting - Static: Good  Sitting - Dynamic: Good  Standing - Static: Good;-  Standing - Dynamic: Fair;+  Single Leg Stance R Leg: 3  Single Leg Stance L Le  Comments: Standing balance assessed w/o AD        Plan   Plan  Times per week: 1-2x/day, 5-6x/week  Current Treatment Recommendations: Strengthening,Balance Training,Functional Mobility Training,Stair training,Gait Training,Transfer Training,Endurance Training,Patient/Caregiver Education & Training,Equipment Evaluation, Education, & procurement,Home Exercise Program,Safety Education & Training  Safety Devices  Type of devices: Bed alarm in place,Call light within reach,Gait belt,Nurse notified,Left in bed  Restraints  Initially in place: No    AM-PAC Score  AM-PAC Inpatient Mobility Raw Score : 17 (12/21/21 1202)  AM-PAC Inpatient T-Scale Score : 42.13 (12/21/21 1202)  Mobility Inpatient CMS 0-100% Score: 50.57 (12/21/21 1202)  Mobility Inpatient CMS G-Code Modifier : CK (12/21/21 1202)       Functional Outcome Measure-   Single Leg Stance Test:  2 sec. (<5 sec.= fall risk)    Goals  Short term goals  Time Frame for Short term goals: 6 visits  Short term goal 1: Pt to demonstrate bed mobility independently  Short term goal 2: Pt to perform STS transfers w/o AD independently  Short term goal 3: Pt to ambulate at least 150ft w/o AD independently  Short term goal 4: Pt to ascend/descend 2 stairs w/ handrails CGA  Short term goal 5: Pt to actively participate in at least 30 minutes of physical therapy for ther act, ther ex, balance, gait, and stair training  Patient Goals   Patient goals :  To go home       Therapy Time   Individual Concurrent Group Co-treatment   Time In 1013         Time Out 1051         Minutes 38           Treatment time: 25 minutes        Fior Mancilla, PT

## 2021-12-21 NOTE — PROGRESS NOTES
Yes  Type of devices: Nurse notified;Gait belt;Bed alarm in place;Call light within reach; Patient at risk for falls; Left in bed           Patient Diagnosis(es): The primary encounter diagnosis was Congestive heart failure, unspecified HF chronicity, unspecified heart failure type ([facility] [address] A diagnosis of Acute respiratory distress was also pertinent to this visit. has a past medical history of A-fib ([facility] [address] Atrial fibrillation ([facility] [address] CH[facility] (congestive heart failure) ([facility] COPD (chronic obstructive pulmonary disease) ([facility] [address] Diverticulosis, TIMOTEO (generalized anxiety disorder), H/O blood clots, History of cardioversion, Liver cyst, Macular degeneration, Pneumonia, Rectocele, and Stage 3b chronic kidney disease ([facility] has a past surgical history that includes ablation of dysrhythmic focus; Pacemaker insertion; Femur Surgery (Right); Appendectomy; Hysterectomy; and Cataract removal (Bilateral). PER H&P: Santiago Gregory is a 80 y.o. female who presents emergency department as a private walk-in with complaints of shortness of breath and a rapid irregular heartbeat. Patient does have a history of congestive heart failure and atrial fibrillation. She also has a COPD patient and smoked up until recently. She has had a history of a cardioversion in the past.  She denies chest pain at this point in time. She denies nausea vomiting or diaphoresis. Restrictions  Restrictions/Precautions  Restrictions/Precautions: General Precautions,Fall Risk  Required Braces or Orthoses?: No  Position Activity Restriction  Other position/activity restrictions: up with assist, telemetry, LUE IV, low sodium diet    Subjective   General  Chart Reviewed: Yes  Patient assessed for rehabilitation services?: Yes  Family / Caregiver Present: No  Subjective  Subjective: \"I am really weak and tired. \"  General Comment  Comments: Pt resting in bed, agreeable to OT eval.  Patient Currently in Pain: Denies    Social/Functional History  Social/Functional History  Lives With: Daughter (daughter is home at all times)  Type of Home: House  Home Layout: One level  Home Access: Stairs to enter with rails  Entrance Stairs - Number of Steps: 2  Entrance Stairs - Rails: Both  Bathroom Shower/Tub: Tub/Shower unit  Bathroom Toilet: Standard  Bathroom Equipment: Shower chair  Home Equipment: Oxygen (2L O2 continous)  ADL Assistance: Needs assistance (daughter supervises shower and helps in and out)  Homemaking Assistance: Needs assistance (daughter does all household work)  Homemaking Responsibilities: Yes  Ambulation Assistance: Independent (None)  Transfer Assistance: Independent  Active : No  Patient's  Info: daughter drives  Occupation: Retired  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  Leisure & Hobbies: Listening to TV (reports she cant do many of her hobbies d/t poor vision)  Additional Comments: Pt denies any recent falls       Objective   Vision: Impaired (Pt reports Macular Degeneration that is getting worse, R eye almost completely blind)  Vision Exceptions: Legally blind  Hearing: Within functional limits    Orientation  Overall Orientation Status: Within Functional Limits  Observation/Palpation  Posture: Fair  Observation: resting in bed  Edema: none  Scar: R knee - previous surgery       Balance  Sitting Balance: Stand by assistance  Standing Balance: Contact guard assistance (No AD)  Standing Balance  Time: standing ~ 1-2 min  Activity: functional mobility  Functional Mobility  Functional - Mobility Device: No device  Activity:  (bed to door back to bed)  Assist Level: Contact guard assistance  Functional Mobility Comments: Pt given Min verbal cues for pacing self, upright posture, scanning environment all to increase safety.        ADL  Feeding: Setup  Grooming: Setup;Stand by assistance  UE Bathing: Setup;Minimal assistance  LE Bathing: Setup;Minimal assistance  UE Dressing: Setup;Minimal assistance (with hosp gown)  LE Dressing: Setup;Minimal assistance (to adjust hosp socks)  Toileting: Contact guard assistance (pt complete hygiene seated on BSC after urination)       Tone RUE  RUE Tone: Normotonic  Tone LUE  LUE Tone: Normotonic  Coordination  Movements Are Fluid And Coordinated: No  Coordination and Movement description: Fine motor impairments;Decreased speed;Right UE;Left UE (slowed B opposition)        Bed mobility  Supine to Sit: Stand by assistance  Sit to Supine: Stand by assistance  Scooting: Stand by assistance  Comment: Pt given min verbal cues for use of bedrail and pacing self all to increase safety. Transfers  Sit to stand: Stand by assistance  Stand to sit: Stand by assistance  Transfer Comments: Pt given Min verbal cues for pacing self, upright posture, sqauring self up to surface and reaching back prior to sitting all to increase safety. Cognition  Overall Cognitive Status: Exceptions  Arousal/Alertness: Appropriate responses to stimuli  Following Commands: Follows multistep commands with repitition; Follows multistep commands with increased time  Attention Span: Appears intact  Memory: Appears intact  Safety Judgement: Decreased awareness of need for assistance;Decreased awareness of need for safety  Problem Solving: Decreased awareness of errors;Assistance required to identify errors made;Assistance required to correct errors made  Insights: Decreased awareness of deficits  Initiation: Does not require cues  Sequencing: Does not require cues        Sensation  Overall Sensation Status: Impaired (sometimes fingers go numb with certain movements)        LUE AROM (degrees)  LUE AROM : WFL  RUE AROM (degrees)  RUE AROM : WFL  LUE Strength  Gross LUE Strength: WFL  LUE Strength Comment: ~ shld 3+/5, Rest ~ 4-/5  RUE Strength  Gross RUE Strength: WFL  RUE Strength Comment: ~ shld 3+/5, Rest ~ 4-/5                   Plan   Plan  Times per week: 4-5x/wk 1x/day as masha  Current Treatment Recommendations: Strengthening,Endurance Training,Balance Training,Functional Mobility Training,Safety Education & Training,Pain Management,Home Management Training,Self-Care / ADL,Equipment Evaluation, Education, & procurement                        AM-PAC Score        AM-PAC Inpatient Daily Activity Raw Score: 19 (12/21/21 1211)  AM-PAC Inpatient ADL T-Scale Score : 40.22 (12/21/21 1211)  ADL Inpatient CMS 0-100% Score: 42.8 (12/21/21 1211)  ADL Inpatient CMS G-Code Modifier : CK (12/21/21 1211)        Goals  Short term goals  Time Frame for Short term goals: By discharge, pt to demo  Short term goal 1: ADL transfers and functional mobility to Mod I with use of AD as needed. Short term goal 2: bed mobility to Mod I with use of bedrails as needed. Short term goal 3: increased B UE strength by 1/2 grade to assist with self care tasks/I with B UE HEP with use of handouts as needed. Short term goal 4: UB ADLs to Set up and LB ADLs to SBA with use of AD/AE as needed. Short term goal 5: toileting to SBA with use of grab bars/BSC as needed. Long term goals  Long term goal 1: Pt to be I with fall prevention edu, recommendations for AE/discharge, EC/WS tech with use of handouts as needed. Patient Goals   Patient goals : To go home!        Therapy Time   Individual Concurrent Group Co-treatment   Time In 3610         Time Out 1028         Minutes 38          tx time: 25 min       Pierre Meckel, OT

## 2021-12-21 NOTE — PLAN OF CARE
Nutrition Problem #1: Mild malnutrition  Intervention: Food and/or Nutrient Delivery: Continue Current Diet,Start Oral Nutrition Supplement  Nutritional Goals: PO intakes are greater than 75% at meals

## 2021-12-21 NOTE — RT PROTOCOL NOTE
RT Inhaler-Nebulizer Bronchodilator Protocol Note    There is a bronchodilator order in the chart from a provider indicating to follow the RT Bronchodilator Protocol and there is an Initiate RT Inhaler-Nebulizer Bronchodilator Protocol order as well (see protocol at bottom of note). CXR Findings:  XR CHEST PORTABLE    Result Date: 12/20/2021  Mild cardiomegaly and pulmonary vascular congestion new since previous. Changes of COPD. The findings from the last RT Protocol Assessment were as follows:   History Pulmonary Disease: Chronic pulmonary disease (COPD, quite smoking \"a few days ago\")  Respiratory Pattern: Dyspnea on exertion or RR 21-25 bpm  Breath Sounds: Slightly diminished and/or crackles  Cough: Strong, spontaneous, non-productive  Indication for Bronchodilator Therapy: Decreased or absent breath sounds,On home bronchodilators  Bronchodilator Assessment Score: 6    Aerosolized bronchodilator medication orders have been revised according to the RT Inhaler-Nebulizer Bronchodilator Protocol below. Respiratory Therapist to perform RT Therapy Protocol Assessment initially then follow the protocol. Repeat RT Therapy Protocol Assessment PRN for score 0-3 or on second treatment, BID, and PRN for scores above 3. No Indications - adjust the frequency to every 6 hours PRN wheezing or bronchospasm, if no treatments needed after 48 hours then discontinue using Per Protocol order mode. If indication present, adjust the RT bronchodilator orders based on the Bronchodilator Assessment Score as indicated below. Use Inhaler orders unless patient has one or more of the following: on home nebulizer, not able to hold breath for 10 seconds, is not alert and oriented, cannot activate and use MDI correctly, or respiratory rate 25 breaths per minute or more, then use the equivalent nebulizer order(s) with same Frequency and PRN reasons based on the score.   If a patient is on this medication at home then do not decrease Frequency below that used at home. 0-3 - enter or revise RT bronchodilator order(s) to equivalent RT Bronchodilator order with Frequency of every 4 hours PRN for wheezing or increased work of breathing using Per Protocol order mode. 4-6 - enter or revise RT Bronchodilator order(s) to two equivalent RT bronchodilator orders with one order with BID Frequency and one order with Frequency of every 4 hours PRN wheezing or increased work of breathing using Per Protocol order mode. 7-10 - enter or revise RT Bronchodilator order(s) to two equivalent RT bronchodilator orders with one order with TID Frequency and one order with Frequency of every 4 hours PRN wheezing or increased work of breathing using Per Protocol order mode. 11-13 - enter or revise RT Bronchodilator order(s) to one equivalent RT bronchodilator order with QID Frequency and an Albuterol order with Frequency of every 4 hours PRN wheezing or increased work of breathing using Per Protocol order mode. Greater than 13 - enter or revise RT Bronchodilator order(s) to one equivalent RT bronchodilator order with every 4 hours Frequency and an Albuterol order with Frequency of every 2 hours PRN wheezing or increased work of breathing using Per Protocol order mode. RT to enter RT Home Evaluation for COPD & MDI Assessment order using Per Protocol order mode.     Electronically signed by Kavitha Ramirez RCP on 12/21/2021 at 1:00 AM

## 2021-12-21 NOTE — FLOWSHEET NOTE
[facility] NOTE    Room # 2008/2008-02   Name: Dolores Shelley            Pentecostalism: Umm June    Reason for visit: Routine    I visited the patient. Admit Date & Time: 12/20/2021  4:10 AM    Assessment:  Dolores Shelley is a 80 y.o. female. Upon entering the room patient was about to begin physical therapy. PT invited visit. Intervention:  I introduced myself and my title as  I offered space for patient  to express feelings, needs, and concerns and provided a ministry presence. Patient asked  for prayer and to contact Bahai. Offered brief prayer and words on encouragement. Outcome:  Patient expressed gratitude. Plan:  Chaplains will remain available to offer spiritual and emotional support as needed. Electronically signed by Faustino Allen on 12/21/2021 at 12:02 PM.  [place]         12/21/21 1150   Encounter Summary   Services provided to: Patient   Referral/Consult From: [facility]   Support System Unknown   Place of [facility] [place] [employer] Completed   Continue Visiting   (12/21/21)   Complexity of Encounter Moderate   Length of Encounter 15 minutes   Spiritual Assessment Completed Yes   Routine   Type Initial   Assessment Passive; Loneliness;Coping   Intervention Active listening;Nurtured hope;Prayer;Sustaining presence/ Ministry of presence;Contacted support as requested per patient/family request   Who? Buddhism   Why?  For prayer   At Request Of Patient   Outcome Expressed gratitude

## 2021-12-22 LAB
ANION GAP SERPL CALCULATED.3IONS-SCNC: 13 MMOL/L (ref 9–17)
BUN BLDV-MCNC: 44 MG/DL (ref 8–23)
BUN/CREAT BLD: 31 (ref 9–20)
CALCIUM SERPL-MCNC: 9.4 MG/DL (ref 8.6–10.4)
CHLORIDE BLD-SCNC: 99 MMOL/L (ref 98–107)
CO2: 28 MMOL/L (ref 20–31)
CREAT SERPL-MCNC: 1.43 MG/DL (ref 0.5–0.9)
GFR AFRICAN AMERICAN: 42 ML/MIN
GFR NON-AFRICAN AMERICAN: 35 ML/MIN
GFR SERPL CREATININE-BSD FRML MDRD: ABNORMAL ML/MIN/{1.73_M2}
GFR SERPL CREATININE-BSD FRML MDRD: ABNORMAL ML/MIN/{1.73_M2}
GLUCOSE BLD-MCNC: 112 MG/DL (ref 70–99)
MAGNESIUM: 2.1 MG/DL (ref 1.6–2.6)
POTASSIUM SERPL-SCNC: 4.2 MMOL/L (ref 3.7–5.3)
SODIUM BLD-SCNC: 140 MMOL/L (ref 135–144)

## 2021-12-22 PROCEDURE — 97535 SELF CARE MNGMENT TRAINING: CPT

## 2021-12-22 PROCEDURE — 80048 BASIC METABOLIC PNL TOTAL CA: CPT

## 2021-12-22 PROCEDURE — 97116 GAIT TRAINING THERAPY: CPT

## 2021-12-22 PROCEDURE — 99232 SBSQ HOSP IP/OBS MODERATE 35: CPT | Performed by: INTERNAL MEDICINE

## 2021-12-22 PROCEDURE — 1200000000 HC SEMI PRIVATE

## 2021-12-22 PROCEDURE — 2580000003 HC RX 258: Performed by: INTERNAL MEDICINE

## 2021-12-22 PROCEDURE — 6360000002 HC RX W HCPCS: Performed by: INTERNAL MEDICINE

## 2021-12-22 PROCEDURE — 6370000000 HC RX 637 (ALT 250 FOR IP): Performed by: NURSE PRACTITIONER

## 2021-12-22 PROCEDURE — 2700000000 HC OXYGEN THERAPY PER DAY

## 2021-12-22 PROCEDURE — 6370000000 HC RX 637 (ALT 250 FOR IP): Performed by: INTERNAL MEDICINE

## 2021-12-22 PROCEDURE — 83735 ASSAY OF MAGNESIUM: CPT

## 2021-12-22 PROCEDURE — 97110 THERAPEUTIC EXERCISES: CPT

## 2021-12-22 PROCEDURE — 94760 N-INVAS EAR/PLS OXIMETRY 1: CPT

## 2021-12-22 PROCEDURE — 36415 COLL VENOUS BLD VENIPUNCTURE: CPT

## 2021-12-22 PROCEDURE — 94640 AIRWAY INHALATION TREATMENT: CPT

## 2021-12-22 RX ORDER — FUROSEMIDE 20 MG/1
20 TABLET ORAL 2 TIMES DAILY
Status: DISCONTINUED | OUTPATIENT
Start: 2021-12-22 | End: 2021-12-23 | Stop reason: HOSPADM

## 2021-12-22 RX ADMIN — METOPROLOL SUCCINATE 25 MG: 25 TABLET, EXTENDED RELEASE ORAL at 10:39

## 2021-12-22 RX ADMIN — IPRATROPIUM BROMIDE AND ALBUTEROL SULFATE 1 AMPULE: .5; 2.5 SOLUTION RESPIRATORY (INHALATION) at 10:22

## 2021-12-22 RX ADMIN — IPRATROPIUM BROMIDE AND ALBUTEROL SULFATE 1 AMPULE: .5; 2.5 SOLUTION RESPIRATORY (INHALATION) at 20:09

## 2021-12-22 RX ADMIN — ONDANSETRON 4 MG: 2 INJECTION INTRAMUSCULAR; INTRAVENOUS at 13:02

## 2021-12-22 RX ADMIN — FUROSEMIDE 20 MG: 20 TABLET ORAL at 10:38

## 2021-12-22 RX ADMIN — TETRAHYDROZOLINE HCL 2 DROP: 0.05 SOLUTION/ DROPS OPHTHALMIC at 13:03

## 2021-12-22 RX ADMIN — POLYETHYLENE GLYCOL 3350 17 G: 17 POWDER, FOR SOLUTION ORAL at 13:03

## 2021-12-22 RX ADMIN — DILTIAZEM HYDROCHLORIDE 300 MG: 180 CAPSULE, COATED, EXTENDED RELEASE ORAL at 10:38

## 2021-12-22 RX ADMIN — SODIUM CHLORIDE, PRESERVATIVE FREE 10 ML: 5 INJECTION INTRAVENOUS at 10:39

## 2021-12-22 RX ADMIN — APIXABAN 2.5 MG: 2.5 TABLET, FILM COATED ORAL at 19:40

## 2021-12-22 RX ADMIN — TETRAHYDROZOLINE HCL 2 DROP: 0.05 SOLUTION/ DROPS OPHTHALMIC at 10:39

## 2021-12-22 RX ADMIN — SODIUM CHLORIDE, PRESERVATIVE FREE 10 ML: 5 INJECTION INTRAVENOUS at 19:40

## 2021-12-22 RX ADMIN — FUROSEMIDE 20 MG: 20 TABLET ORAL at 17:24

## 2021-12-22 RX ADMIN — APIXABAN 2.5 MG: 2.5 TABLET, FILM COATED ORAL at 10:38

## 2021-12-22 RX ADMIN — SPIRONOLACTONE 25 MG: 25 TABLET, FILM COATED ORAL at 10:39

## 2021-12-22 ASSESSMENT — PAIN SCALES - GENERAL
PAINLEVEL_OUTOF10: 0
PAINLEVEL_OUTOF10: 0

## 2021-12-22 NOTE — PROGRESS NOTES
Physical Therapy  Facility/Department: [facility]  Daily Treatment Note  NAME: Steffen Galeano  : 1935  MRN: [id number]    Date of Service: 2021    Discharge Recommendations:  Patient would benefit from continued therapy after discharge     Due to recent hospitalization and medical condition, pt would benefit from additional therapy at time of discharge to ensure safety. Please refer to the AM-PAC score for current functional status. Assessment   Body structures, Functions, Activity limitations: Decreased strength;Decreased balance;Decreased endurance;Decreased safe awareness  Assessment: Patient progression in ambulation techniques noted today. Patient fatigues easily with increased effort. Patient would benefit from continued skilled PT to maximize return to PLOF. Prognosis: Excellent  Decision Making: Medium Complexity  REQUIRES PT FOLLOW UP: Yes  Activity Tolerance  Activity Tolerance: Patient Tolerated treatment well     Patient Diagnosis(es): The primary encounter diagnosis was Congestive heart failure, unspecified HF chronicity, unspecified heart failure type ([address] A diagnosis of Acute respiratory distress was also pertinent to this visit. has a past medical history of A-fib ([address] Atrial fibrillation ([address] CHF (congestive heart failure) ([address] COPD (chronic obstructive pulmonary disease) ([address] Diverticulosis, TIMOTEO (generalized anxiety disorder), H/O blood clots, History of cardioversion, Liver cyst, Macular degeneration, Pneumonia, Rectocele, and Stage 3b chronic kidney disease ([address] has a past surgical history that includes ablation of dysrhythmic focus; Pacemaker insertion; Femur Surgery (Right); Appendectomy; Hysterectomy; and Cataract removal (Bilateral).     Restrictions  Restrictions/Precautions  Restrictions/Precautions: General Precautions,Fall Risk  Required Braces or Orthoses?: No  Position Activity Restriction  Other position/activity restrictions: up with assist, telemetry, LUE IV, low sodium diet  Subjective   General  Chart Reviewed: Yes  Response To Previous Treatment: Not applicable  Family / Caregiver Present: No  Subjective  Subjective: Patient states she is feeling \"weak and tired\"  General Comment  Comments: RN and pt agreeable to therapy. Pt supine in bed upon arrival.  Pt pleasant and cooperative throughout. Pain Screening  Patient Currently in Pain: Denies  Vital Signs  Patient Currently in Pain: Denies       Orientation  Orientation  Overall Orientation Status: Within Functional Limits  Cognition      Objective   Bed mobility  Bridging: Stand by assistance  Rolling to Left: Stand by assistance  Rolling to Right: Stand by assistance  Supine to Sit: Stand by assistance  Sit to Supine: Stand by assistance  Scooting: Stand by assistance  Comment: Patient requires MIN vc's for progression and sequencing throughout treatment. Patient requires min vc's to scoot all the way out and place feet on floor for stability and support. Transfers  Sit to Stand: Contact guard assistance  Stand to sit: Contact guard assistance  Bed to Chair: Contact guard assistance  Lateral Transfers: Stand by assistance  Comment: Patient requires min vc's to back all the way up and feel the seat of the chair on the back of her legs before reaching back for the arms of the chair and to slowly lower body down into seated posture. Patient with good return on demo. Patient assisted to restroom and stood in front of the toilet x3 minutes performing maldonado care for self. Patient then ambulates to sink and stands in front of sink for 3 minutes performing hand hygiene before ambulation to side chair and retiring for lunch this date. Ambulation  Ambulation?: Yes  More Ambulation?: No  Ambulation 1  Surface: level tile  Device: No Device  Assistance: Contact guard assistance  Quality of Gait: fair steadiness  Gait Deviations: Slow Nadia; Shuffles  Distance: 50 feet x1 15 feet x2  Comments: Patient with good stance posture with slight flexion forward. Requires min vc's for posture correction with good carryover this date. Stairs/Curb  Stairs?: No  Neuromuscular Education  NDT Treatment: Sitting; Lower extremity  Balance  Comments: Patient balance assessed without device this date. Patient too fatigued from ambulation to attempt single leg stance this date. Exercises  Gluteal Sets: 10 x1  Hip Abduction: 10 x1 in seated  Knee Long Arc Quad: 10 x1 in seated  Ankle Pumps: 10 x1 in seated  Comments: Patient performs DRU LE AROM in seated posture including marches x10 DRU. AM-PAC Score  AM-PAC Inpatient Mobility Raw Score : 17 (12/22/21 1205)  AM-PAC Inpatient T-Scale Score : 42.13 (12/22/21 1205)  Mobility Inpatient CMS 0-100% Score: 50.57 (12/22/21 1205)  Mobility Inpatient CMS G-Code Modifier : CK (12/22/21 1205)          Goals  Short term goals  Time Frame for Short term goals: 6 visits  Short term goal 1: Pt to demonstrate bed mobility independently  Short term goal 2: Pt to perform STS transfers w/o AD independently  Short term goal 3: Pt to ambulate at least 150ft w/o AD independently  Short term goal 4: Pt to ascend/descend 2 stairs w/ handrails CGA  Short term goal 5: Pt to actively participate in at least 30 minutes of physical therapy for ther act, ther ex, balance, gait, and stair training  Patient Goals   Patient goals :  To go home    Plan    Plan  Times per week: 1-2x/day, 5-6x/week  Current Treatment Recommendations: Strengthening,Balance Training,Functional Mobility Training,Stair training,Gait Training,Transfer Training,Endurance Training,Patient/Caregiver Education & Training,Equipment Evaluation, Education, & procurement,Home Exercise Program,Safety Education & Training  Safety Devices  Type of devices: Bed alarm in place,Call light within reach,Gait belt,Nurse notified,Left in bed  Restraints  Initially in place: No     Therapy Time   Individual Concurrent Group Co-treatment   Time In 1115

## 2021-12-22 NOTE — PROGRESS NOTES
[facility]  Office: [address], DO, Evens Steinerdelio, DO, Skyrex Pratt, DO, Panchoabelardo Burns Blood, DO, Cordella Holstein, MD, Agustin Whitley MD, Elena Arteaga MD, Eduardo Richter MD, Miguel Irvin MD, Jose Francisco Nolasco MD, Leila Mahoney MD, Radha Ventura, DO, León Fernandes, DO, Kathia Guzmán MD,  Kristen Bowie DO, Ralf Renteria MD, Yodit Marquez MD, Jaqueline Covarrubias MD, Nayely Wyatt MD, Darwin Rubio MD, Kareem Dumont MD, Renella Boeck, MD, Ashley Mar, [facility], [facility], CNP, Inga Burgos, CNP, Nic Pedraza, CNS, Juan José Lazo, CNP, Keke Shah, CNP, Lamonte Young, CNP, Charlene Thompson, [facility], Hilton Shaffer, CNP, Lior Su PA-C, Oswald Han, DNP, Davey Moreno, DNP, Taylor Ireland, CNP, Vitor Reed, CNP, Ayana Mejia, CNP, Jovan Hollingsworth, CNP, Gabe Peter, CNP, David Quiroz, [facility]    Progress Note    12/22/2021    2:51 PM    Name:   Timo Wild  MRN:     [id number]     [place]:      [de-identified]   Room:   2008/2008-02  IP Day:  2  Admit Date:  12/20/2021  4:10 AM    PCP:   Carey aHrden MD  Code Status:  Full Code    Subjective:     C/C:   Chief Complaint   Patient presents with    Shortness of Breath    Atrial Fibrillation     Interval History Status:    Shortness of breath is better after IV diuresis  Blood pressure 96/52, heart rate 90, saturating 96% on 2 L oxygen via nasal cannula     Brief History:   Timo Wild is a 80 y.o.  female who presents with Shortness of Breath and Atrial Fibrillation     Patient was admitted thru ER with:  \" Natasha Walls is a 80 y.o. female who presents emergency department as a private walk-in with complaints of shortness of breath and a rapid irregular heartbeat.  Patient does have a history of congestive heart failure and atrial fibrillation.  She also has a COPD patient and smoked up until recently. Carl Cano has had a history of a cardioversion in the past. Carl Cano denies chest pain at this point in time.  She denies nausea vomiting or diaphoresis. \"      The patient reports that she is responding to treatment  She is less short of breath  No chest pain     Initial database has included:  WBC22.0 High k/uL RBC4.63m/uL Cyodnvjzcf99.9      Pro-BNP1,296 High       EKG:  Atrial fibrillation   Left axis deviation   Right bundle branch block   Anterior infarct , age undetermined   T wave abnormality, consider lateral ischemia   Abnormal ECG   When compared with ECG of 03-OCT-2021 17:45,   Anterior infarct is now Present      Blood culture negative     Chest x-ray:  Impression:  Mild cardiomegaly and pulmonary vascular congestion new since previous. Changes of COPD.          Review of Systems:     Constitutional:  negative for chills, fevers, sweats,+ fatigue  Respiratory:  negative for cough, + shortness of breath, denies wheezing  Cardiovascular:  negative for chest pain, chest pressure/discomfort, lower extremity edema, + palpitations  Gastrointestinal:  negative for abdominal pain, constipation, diarrhea, nausea, vomiting  Neurological:  negative for dizziness, headache    Medications: Allergies:     Allergies   Allergen Reactions    Gabapentin Other (See Comments)     Other reaction(s): hallucinating/jerking    Keflex [Cephalexin] Anaphylaxis, Hives and Swelling    Tegretol [Carbamazepine] Anaphylaxis    Adhesive Tape Rash    Demerol Hcl [Meperidine] Nausea And Vomiting    Flagyl [Metronidazole] Nausea And Vomiting       Current Meds:   Scheduled Meds:    furosemide  20 mg Oral BID    ipratropium-albuterol  1 ampule Inhalation BID    tetrahydrozoline  2 drop Both Eyes TID    apixaban  2.5 mg Oral BID    dilTIAZem  300 mg Oral Daily    metoprolol succinate  25 mg Oral Daily    spironolactone  25 mg Oral Daily    [Held by provider] lisinopril  5 mg Oral Daily    sodium chloride flush  5-40 mL IntraVENous 2 times per day     Continuous Infusions:    sodium chloride PRN Meds: sodium chloride, acetaminophen **OR** acetaminophen, ondansetron **OR** ondansetron, polyethylene glycol, sodium chloride flush, bisacodyl, ipratropium-albuterol, diazePAM, polyvinyl alcohol    Data:     Past Medical History:   has a past medical history of A-fib ([facility] Atrial fibrillation ([facility] CHF (congestive heart failure) ([facility] COPD (chronic obstructive pulmonary disease) ([facility] Diverticulosis, TIMOTEO (generalized anxiety disorder), H/O blood clots, History of cardioversion, Liver cyst, Macular degeneration, Pneumonia, Rectocele, and Stage 3b chronic kidney disease ([facility] Social History:   reports that she quit smoking about 15 years ago. Her smoking use included cigarettes. She has never used smokeless tobacco. She reports that she does not drink alcohol and does not use drugs. Family History:   Family History   Family history unknown: Yes       Vitals:  BP (!) 96/52   Pulse 123   Temp 97.5 °F (36.4 °C) (Oral)   Resp 16   Ht 4' 11\" (1.499 m)   Wt 116 lb (52.6 kg)   SpO2 97%   BMI 23.43 kg/m²   Temp (24hrs), Av.7 °F (36.5 °C), Min:97.4 °F (36.3 °C), Max:98.1 °F (36.7 °C)    No results for input(s): POCGLU in the last 72 hours. I/O (24Hr):     Intake/Output Summary (Last 24 hours) at 2021 1451  Last data filed at 2021 6761  Gross per 24 hour   Intake 200 ml   Output 1 ml   Net 199 ml       Labs:  Hematology:  Recent Labs     21  0426 21  0906 21  0632   WBC 17.9* 22.0* 8.7   RBC 4.75 4.63 4.32   HGB 15.0 14.9 13.4   HCT 45.5 44.0 41.3   MCV 95.7 95.2 95.6   MCH 31.5 32.1 31.0   MCHC 32.9 33.8 32.4   RDW 14.1 13.8 12.7    202 248   MPV 8.1 8.3 9.6   INR 1.0  --   --      Chemistry:  Recent Labs     21  0426 21  0632 21  0619    142 140   K 4.2 4.1 4.2    100 99   CO2 24 26 28   GLUCOSE 173* 89 112*   BUN 32* 37* 44*   CREATININE 1.00* 1.36* 1.43*   MG  --  2.0 2.1   ANIONGAP 13 16 13   LABGLOM 53* 37* 35*   GFRAA >60 45* 42*   CALCIUM 9.8 9.2 9.4   PROBNP 1,296* 1,885*  --    TROPHS 11  --   --      Recent Labs     12/21/21  0632   TSH 0.27*   CHOL 165   HDL 37*   LDLCHOLESTEROL 111   CHOLHDLRATIO 4.5   TRIG 84   VLDL NOT REPORTED     ABG:No results found for: POCPH, PHART, PH, POCPCO2, WVY7AHG, PCO2, POCPO2, PO2ART, PO2, POCHCO3, MZI4HOT, HCO3, NBEA, PBEA, BEART, BE, THGBART, THB, DIJ3NSP, KLOK9FKU, Z5WFFAML, O2SAT, FIO2  Lab Results   Component Value Date/Time    SPECIAL RIGHT ANTECUBE 20CC 12/20/2021 09:00 AM     Lab Results   Component Value Date/Time    CULTURE NO GROWTH 2 DAYS 12/20/2021 09:00 AM       Radiology:  XR CHEST (2 VW)    Result Date: 12/21/2021  No significant change     XR CHEST PORTABLE    Result Date: 12/20/2021  Mild cardiomegaly and pulmonary vascular congestion new since previous. Changes of COPD. Physical Examination:        General appearance:  alert, cooperative and no distress  Mental Status:  oriented to person, place and time and anxious affect  Lungs: Diminished breath sounds at bases, few bibasilar Rales  Heart: Irregular rhythm, no murmur,+ pacemaker  Abdomen:  soft, nontender, nondistended, normal bowel sounds, no masses, hepatomegaly, splenomegaly  Extremities:  no edema, redness, tenderness in the calves  Skin:  no gross lesions, rashes, induration    Assessment:        Hospital Problems           Last Modified POA    * (Principal) Congestive heart failure with unknown left ventricular ejection fraction ([address] 12/20/2021 Yes    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          COPD without exacerbation ([address] 12/20/2021 Yes    Acute on chronic diastolic congestive heart failure ([address] 12/20/2021 Yes    Stage 3b chronic kidney disease ([address] 12/20/2021 Yes    TIMOTEO (generalized anxiety disorder) 12/20/2021 Yes Acute respiratory distress 12/20/2021 Yes    Chronic anticoagulation 12/20/2021 Yes    Mild malnutrition ([address] 12/21/2021 Yes          Plan:        1. Change IV diuresis to oral Lasix 20 mg 2 times daily as creatinine is rising  2. Continue Toprol-XL 25 mg daily  3. Hold lisinopril due to blood pressure being low  4. Continue home dose of Eliquis  5. MiraLAX as needed  6. Low-sodium diet  7. Clear eyedrops to be continued  8. Patient wants to go home tomorrow  9.  Likely to be discharged tomorrow if feels better    Ashley Espinal MD  12/22/2021  2:51 PM

## 2021-12-22 NOTE — PROGRESS NOTES
Occupational Therapy  Facility/Department: [facility] MED SURG  Daily Treatment Note  NAME: Torie Ferguson  : 1935  MRN: [id number]    Date of Service: 2021    Discharge Recommendations:  Patient would benefit from continued therapy after discharge     Due to recent hospitalization and medical condition, pt would benefit from additional therapy at time of discharge to ensure safety. Please refer to the AM-PAC score for current functional status. OT Equipment Recommendations  Equipment Needed: Yes  Mobility Devices: ADL Assistive Devices  ADL Assistive Devices: Long-handled Shoe Horn;Emergency Alert System;Long-handled Sponge;Reacher;Sock-Aid Hard    Assessment   Performance deficits / Impairments: Decreased functional mobility ; Decreased ADL status; Decreased safe awareness;Decreased strength;Decreased endurance;Decreased high-level IADLs;Decreased fine motor control;Decreased cognition;Decreased vision/visual deficit; Decreased posture;Decreased coordination;Decreased balance  Assessment: Pt would benefit from continued skilled OT services to increase I and safety during functional tasks to return home at prior level of function as able. Prognosis: Good  OT Education: OT Role;Transfer Training;Plan of Care;Energy Conservation; ADL Adaptive Strategies;Precautions  REQUIRES OT FOLLOW UP: Yes  Activity Tolerance  Activity Tolerance: Patient limited by fatigue;Patient Tolerated treatment well  Safety Devices  Safety Devices in place: Yes  Type of devices: Nurse notified;Gait belt;Bed alarm in place;Call light within reach; Patient at risk for falls; Left in bed         Patient Diagnosis(es): The primary encounter diagnosis was Congestive heart failure, unspecified HF chronicity, unspecified heart failure type ([address] A diagnosis of Acute respiratory distress was also pertinent to this visit.       has a past medical history of A-fib [facility]), Atrial fibrillation ([address] CHF (congestive heart failure) ([address] COPD (chronic obstructive pulmonary disease) ([facility] Diverticulosis, TIMOTEO (generalized anxiety disorder), H/O blood clots, History of cardioversion, Liver cyst, Macular degeneration, Pneumonia, Rectocele, and Stage 3b chronic kidney disease ([facility] has a past surgical history that includes ablation of dysrhythmic focus; Pacemaker insertion; Femur Surgery (Right); Appendectomy; Hysterectomy; and Cataract removal (Bilateral). Restrictions  Restrictions/Precautions  Restrictions/Precautions: General Precautions,Fall Risk  Required Braces or Orthoses?: No  Position Activity Restriction  Other position/activity restrictions: up with assist, telemetry, LUE IV, low sodium diet  Subjective   General  Chart Reviewed: Yes  Patient assessed for rehabilitation services?: Yes  Response to previous treatment: Patient with no complaints from previous session  Family / Caregiver Present: No  Subjective  Subjective: \"I feel weak. \"  General Comment  Comments: Patient agreeable for OT treatment session      Orientation  Orientation  Overall Orientation Status: Impaired  Orientation Level: Disoriented to time  Objective    ADL  Grooming: Setup;Stand by assistance (to wash face and rinse dentures standing at sink)  Toileting: Stand by assistance;Contact guard assistance (hygiene)        Balance  Sitting Balance: Supervision  Standing Balance: Contact guard assistance    Functional Mobility  Functional - Mobility Device: No device  Activity: To/from bathroom  Assist Level: Contact guard assistance  Functional Mobility Comments: Pt given Min verbal cues for pacing self, upright posture, scanning environment all to increase safety.     Toilet Transfers  Toilet - Technique: Ambulating  Equipment Used: Grab bars  Toilet Transfer: Contact guard assistance;Minimal assistance    Bed mobility  Sit to Supine: Stand by assistance  Scooting: Stand by assistance    Transfers  Sit to stand: Contact guard assistance;Minimal assistance  Stand to sit: Contact guard assistance;Minimal assistance      Cognition  Overall Cognitive Status: Exceptions  Arousal/Alertness: Appropriate responses to stimuli  Following Commands: Follows multistep commands with repitition; Follows multistep commands with increased time  Attention Span: Appears intact  Memory: Appears intact  Safety Judgement: Decreased awareness of need for assistance;Decreased awareness of need for safety  Problem Solving: Decreased awareness of errors;Assistance required to identify errors made;Assistance required to correct errors made  Insights: Decreased awareness of deficits  Initiation: Does not require cues  Sequencing: Does not require cues     Access Code: [email or web address]  URL: [email or web address]  Date: 12/22/2021  Prepared by: Bethany Scott    Exercises  Seated Cervical Rotation AROM - 1 x daily - 7 x weekly - 3 sets - 10 reps  Seated Cervical Sidebending AROM - 1 x daily - 7 x weekly - 3 sets - 10 reps  Seated Elbow Flexion and Extension AROM - 1 x daily - 7 x weekly - 3 sets - 10 reps  Seated Scapular Retraction - 1 x daily - 7 x weekly - 3 sets - 10 reps  Seated Shoulder Shrugs - 1 x daily - 7 x weekly - 3 sets - 10 reps  Seated Shoulder External Rotation - 1 x daily - 7 x weekly - 3 sets - 10 reps  Seated Shoulder Flexion - 1 x daily - 7 x weekly - 3 sets - 10 reps  Seated Shoulder Abduction - Thumbs Up - 1 x daily - 7 x weekly - 3 sets - 10 reps    Patient Education  Understanding Energy Conservation  Energy Conservation During Daily Tasks  Home Management  Check for Safety      Plan   Plan  Times per week: 4-5x/wk 1x/day as masha  Times per day: Daily  Current Treatment Recommendations: Strengthening,Endurance Training,Balance Training,Functional Mobility Training,Safety Education & Training,Pain Management,Home Management Training,Self-Care / ADL,Equipment Evaluation, Education, & procurement    AM-PAC Score        AM-PAC Inpatient Daily Activity Raw Score: 19 (12/22/21 1240)  AM-PAC Inpatient ADL T-Scale Score : 40.22 (12/22/21 1240)  ADL Inpatient CMS 0-100% Score: 42.8 (12/22/21 1240)  ADL Inpatient CMS G-Code Modifier : CK (12/22/21 1240)    Goals  Short term goals  Time Frame for Short term goals: By discharge, pt to demo  Short term goal 1: ADL transfers and functional mobility to Mod I with use of AD as needed. Short term goal 2: bed mobility to Mod I with use of bedrails as needed. Short term goal 3: increased B UE strength by 1/2 grade to assist with self care tasks/I with B UE HEP with use of handouts as needed. Short term goal 4: UB ADLs to Set up and LB ADLs to SBA with use of AD/AE as needed. Short term goal 5: toileting to SBA with use of grab bars/BSC as needed. Long term goals  Long term goal 1: Pt to be I with fall prevention edu, recommendations for AE/discharge, EC/WS tech with use of handouts as needed. Patient Goals   Patient goals : To go home! Therapy Time   Individual Concurrent Group Co-treatment   Time In 3457         Time Out 1240         Minutes 24           Upon writer exit, call light within reach, pt retired to bed. All lines intact and patient positioned comfortably. All patient needs addressed prior to ending therapy session. Chart reviewed prior to treatment and patient is agreeable for therapy. RN reports patient is medically stable for therapy treatment this date.       HANNAH Fish/ABHI

## 2021-12-22 NOTE — RT PROTOCOL NOTE
RT Inhaler-Nebulizer Bronchodilator Protocol Note    There is a bronchodilator order in the chart from a provider indicating to follow the RT Bronchodilator Protocol and there is an Initiate RT Inhaler-Nebulizer Bronchodilator Protocol order as well (see protocol at bottom of note). CXR Findings:  No results found. The findings from the last RT Protocol Assessment were as follows:   History Pulmonary Disease: Chronic pulmonary disease  Respiratory Pattern: Regular pattern and RR 12-20 bpm  Breath Sounds: Slightly diminished and/or crackles  Cough: Strong, spontaneous, non-productive  Indication for Bronchodilator Therapy: Decreased or absent breath sounds  Bronchodilator Assessment Score: 4    Aerosolized bronchodilator medication orders have been revised according to the RT Inhaler-Nebulizer Bronchodilator Protocol below. Respiratory Therapist to perform RT Therapy Protocol Assessment initially then follow the protocol. Repeat RT Therapy Protocol Assessment PRN for score 0-3 or on second treatment, BID, and PRN for scores above 3. No Indications - adjust the frequency to every 6 hours PRN wheezing or bronchospasm, if no treatments needed after 48 hours then discontinue using Per Protocol order mode. If indication present, adjust the RT bronchodilator orders based on the Bronchodilator Assessment Score as indicated below. Use Inhaler orders unless patient has one or more of the following: on home nebulizer, not able to hold breath for 10 seconds, is not alert and oriented, cannot activate and use MDI correctly, or respiratory rate 25 breaths per minute or more, then use the equivalent nebulizer order(s) with same Frequency and PRN reasons based on the score. If a patient is on this medication at home then do not decrease Frequency below that used at home.     0-3 - enter or revise RT bronchodilator order(s) to equivalent RT Bronchodilator order with Frequency of every 4 hours PRN for wheezing or increased work of breathing using Per Protocol order mode. 4-6 - enter or revise RT Bronchodilator order(s) to two equivalent RT bronchodilator orders with one order with BID Frequency and one order with Frequency of every 4 hours PRN wheezing or increased work of breathing using Per Protocol order mode. 7-10 - enter or revise RT Bronchodilator order(s) to two equivalent RT bronchodilator orders with one order with TID Frequency and one order with Frequency of every 4 hours PRN wheezing or increased work of breathing using Per Protocol order mode. 11-13 - enter or revise RT Bronchodilator order(s) to one equivalent RT bronchodilator order with QID Frequency and an Albuterol order with Frequency of every 4 hours PRN wheezing or increased work of breathing using Per Protocol order mode. Greater than 13 - enter or revise RT Bronchodilator order(s) to one equivalent RT bronchodilator order with every 4 hours Frequency and an Albuterol order with Frequency of every 2 hours PRN wheezing or increased work of breathing using Per Protocol order mode. RT to enter RT Home Evaluation for COPD & MDI Assessment order using Per Protocol order mode.     Electronically signed by natacha licea RCP on 12/22/2021 at 10:27 AM

## 2021-12-22 NOTE — CARE COORDINATION
Changed to oral lasix today. Potential D/C Thursday if feeling better per Dr. Caitlin Rosenbaum. Declines [facility].

## 2021-12-22 NOTE — PLAN OF CARE
Problem: Falls - Risk of:  Goal: Will remain free from falls  Description: Will remain free from falls  12/22/2021 1439 by Yara Gaelana RN  Outcome: Ongoing  Note: Patient is a fall risk during this admission. Fall risk assessment was performed. Patient is absent of falls. Bed is in the lowest position. Wheels on the bed are locked. Call light and bed side table are within reach. Clutter is removed. Patient was educated to call out when needing assistance or wanting to get out of bed. Patient offered toileting assistance during rounding. Hourly rounds have been performed.        Problem: OXYGENATION/RESPIRATORY FUNCTION  Goal: Patient will achieve/maintain normal respiratory rate/effort  Description: Respiratory rate and effort will be within normal limits for the patient  12/22/2021 1439 by Yara Galeana RN  Outcome: Ongoing     Problem: FLUID AND ELECTROLYTE IMBALANCE  Goal: Fluid and electrolyte balance are achieved/maintained  12/22/2021 1439 by Yara Galeana RN  Outcome: Ongoing     Problem: ACTIVITY INTOLERANCE/IMPAIRED MOBILITY  Goal: Mobility/activity is maintained at optimum level for patient  12/22/2021 1439 by Yara Galeana RN  Outcome: Ongoing     Problem: Pain:  Goal: Pain level will decrease  Description: Pain level will decrease  12/22/2021 1439 by Yara Galeana RN  Outcome: Ongoing     Problem: Nutrition  Goal: Optimal nutrition therapy  12/22/2021 1439 by Yara Galeana RN  Outcome: Ongoing

## 2021-12-23 VITALS
DIASTOLIC BLOOD PRESSURE: 49 MMHG | HEART RATE: 110 BPM | BODY MASS INDEX: 24.39 KG/M2 | WEIGHT: 121 LBS | OXYGEN SATURATION: 97 % | HEIGHT: 59 IN | TEMPERATURE: 97.5 F | SYSTOLIC BLOOD PRESSURE: 110 MMHG | RESPIRATION RATE: 16 BRPM

## 2021-12-23 LAB
ANION GAP SERPL CALCULATED.3IONS-SCNC: 10 MMOL/L (ref 9–17)
BNP INTERPRETATION: ABNORMAL
BUN BLDV-MCNC: 42 MG/DL (ref 8–23)
BUN/CREAT BLD: 32 (ref 9–20)
CALCIUM SERPL-MCNC: 9.1 MG/DL (ref 8.6–10.4)
CHLORIDE BLD-SCNC: 97 MMOL/L (ref 98–107)
CO2: 31 MMOL/L (ref 20–31)
CREAT SERPL-MCNC: 1.3 MG/DL (ref 0.5–0.9)
GFR AFRICAN AMERICAN: 47 ML/MIN
GFR NON-AFRICAN AMERICAN: 39 ML/MIN
GFR SERPL CREATININE-BSD FRML MDRD: ABNORMAL ML/MIN/{1.73_M2}
GFR SERPL CREATININE-BSD FRML MDRD: ABNORMAL ML/MIN/{1.73_M2}
GLUCOSE BLD-MCNC: 107 MG/DL (ref 70–99)
POTASSIUM SERPL-SCNC: 4.4 MMOL/L (ref 3.7–5.3)
PRO-BNP: 1496 PG/ML
SODIUM BLD-SCNC: 138 MMOL/L (ref 135–144)

## 2021-12-23 PROCEDURE — 97116 GAIT TRAINING THERAPY: CPT

## 2021-12-23 PROCEDURE — 94761 N-INVAS EAR/PLS OXIMETRY MLT: CPT

## 2021-12-23 PROCEDURE — 99232 SBSQ HOSP IP/OBS MODERATE 35: CPT | Performed by: INTERNAL MEDICINE

## 2021-12-23 PROCEDURE — 80048 BASIC METABOLIC PNL TOTAL CA: CPT

## 2021-12-23 PROCEDURE — 94640 AIRWAY INHALATION TREATMENT: CPT

## 2021-12-23 PROCEDURE — 2700000000 HC OXYGEN THERAPY PER DAY

## 2021-12-23 PROCEDURE — 6370000000 HC RX 637 (ALT 250 FOR IP): Performed by: INTERNAL MEDICINE

## 2021-12-23 PROCEDURE — 97530 THERAPEUTIC ACTIVITIES: CPT

## 2021-12-23 PROCEDURE — 2580000003 HC RX 258: Performed by: INTERNAL MEDICINE

## 2021-12-23 PROCEDURE — 83880 ASSAY OF NATRIURETIC PEPTIDE: CPT

## 2021-12-23 PROCEDURE — 97129 THER IVNTJ 1ST 15 MIN: CPT

## 2021-12-23 PROCEDURE — 6360000002 HC RX W HCPCS: Performed by: INTERNAL MEDICINE

## 2021-12-23 PROCEDURE — 36415 COLL VENOUS BLD VENIPUNCTURE: CPT

## 2021-12-23 PROCEDURE — 6370000000 HC RX 637 (ALT 250 FOR IP): Performed by: NURSE PRACTITIONER

## 2021-12-23 RX ORDER — TETRAHYDROZOLINE HCL 0.05 %
2 DROPS OPHTHALMIC (EYE) 3 TIMES DAILY
Qty: 10 ML | Refills: 4 | Status: SHIPPED | OUTPATIENT
Start: 2021-12-23

## 2021-12-23 RX ORDER — FUROSEMIDE 20 MG/1
20 TABLET ORAL 2 TIMES DAILY
Qty: 60 TABLET | Refills: 3 | Status: SHIPPED | OUTPATIENT
Start: 2021-12-23

## 2021-12-23 RX ORDER — METOPROLOL SUCCINATE 25 MG/1
25 TABLET, EXTENDED RELEASE ORAL DAILY
Qty: 30 TABLET | Refills: 1 | Status: SHIPPED | OUTPATIENT
Start: 2021-12-23

## 2021-12-23 RX ORDER — LISINOPRIL 5 MG/1
5 TABLET ORAL DAILY
Qty: 30 TABLET | Refills: 0 | Status: SHIPPED | OUTPATIENT
Start: 2021-12-23

## 2021-12-23 RX ADMIN — DIAZEPAM 5 MG: 5 TABLET ORAL at 02:02

## 2021-12-23 RX ADMIN — IPRATROPIUM BROMIDE AND ALBUTEROL SULFATE 1 AMPULE: .5; 2.5 SOLUTION RESPIRATORY (INHALATION) at 09:30

## 2021-12-23 RX ADMIN — ONDANSETRON 4 MG: 2 INJECTION INTRAMUSCULAR; INTRAVENOUS at 01:05

## 2021-12-23 RX ADMIN — FUROSEMIDE 20 MG: 20 TABLET ORAL at 09:03

## 2021-12-23 RX ADMIN — APIXABAN 2.5 MG: 2.5 TABLET, FILM COATED ORAL at 09:04

## 2021-12-23 RX ADMIN — METOPROLOL SUCCINATE 25 MG: 25 TABLET, EXTENDED RELEASE ORAL at 12:55

## 2021-12-23 RX ADMIN — SPIRONOLACTONE 25 MG: 25 TABLET, FILM COATED ORAL at 09:04

## 2021-12-23 RX ADMIN — SODIUM CHLORIDE, PRESERVATIVE FREE 10 ML: 5 INJECTION INTRAVENOUS at 09:04

## 2021-12-23 RX ADMIN — DILTIAZEM HYDROCHLORIDE 300 MG: 180 CAPSULE, COATED, EXTENDED RELEASE ORAL at 09:03

## 2021-12-23 ASSESSMENT — PAIN SCALES - GENERAL: PAINLEVEL_OUTOF10: 4

## 2021-12-23 ASSESSMENT — PAIN DESCRIPTION - LOCATION: LOCATION: GENERALIZED

## 2021-12-23 NOTE — PLAN OF CARE
Problem: Falls - Risk of:  Goal: Will remain free from falls  Description: Will remain free from falls  12/23/2021 1415 by Eloisa Carlson RN  Outcome: Ongoing     Problem: OXYGENATION/RESPIRATORY FUNCTION  Goal: Patient will achieve/maintain normal respiratory rate/effort  Description: Respiratory rate and effort will be within normal limits for the patient  12/23/2021 1415 by Eloisa Carlson RN  Outcome: Ongoing     Problem: Pain:  Goal: Pain level will decrease  Description: Pain level will decrease  Outcome: Ongoing     Problem: Nutrition  Goal: Optimal nutrition therapy  Outcome: Ongoing     Problem: ABCDS Injury Assessment  Goal: Absence of physical injury  Outcome: Ongoing

## 2021-12-23 NOTE — PROGRESS NOTES
[facility]  Office: [address], DO, Eugenio Marroquins, DO, Niki Tran, DO, Jordy Quiñonez, DO, Alivia Murray MD, Isi Romero MD, Sheila Gallegos MD, Franc Sanford MD, Ravi Cabrera MD, Gerardo Sanz MD, Berenice Roland MD, Gordon Denson, DO, Dom Barnes, DO, Kushal Mosely MD,  Lang Staff, DO, Elpidio Crowder MD, Brandan Hinds MD, Jamie Rao MD, Aureliano Sommer MD, Marcia Gallegos MD, Thao Davalos MD, Sim Milan MD, [address], [facility], [facility], CNP, Evie Cancel, CNP, Marilia Fret, CNS, Clara Ling, CNP, Sarika Brain, CNP, Tommy Cline, CNP, Alta Alonzo, CNP, Gordon Lawton, CNP, Rj Briceno PA-C, Winsome Mckeon, DNP, Selina Lockett, [facility] [place], Alyssa Hung, CNP, Angela Saavedra, CNP, Elia Martinez, CNP, Jaren Frankel, CNP, Tristan Guzman, CNP, [facility]    Progress Note    12/23/2021    11:47 AM    Name:   Jose Manuel Bartlett  MRN:     [id number]     [place]:      [de-identified]   Room:   2008/2008-02  IP Day:  3  Admit Date:  12/20/2021  4:10 AM    PCP:   Chata Venegas MD  Code Status:  Full Code    Subjective:     C/C:   Chief Complaint   Patient presents with    Shortness of Breath    Atrial Fibrillation     Interval History Status:    Shortness of breath is better with oral and IV diuresis  Creatinine has started trending down after changing Lasix to oral-1.30 today  Blood pressure 98/46, heart rate 55, saturating 98 % on 2 L oxygen via nasal cannula, uses same at home  Feels comfortable and wants to go home today  Brief History:   Jose Manuel Bartlett is a 80 y.o.  female who presents with Shortness of Breath and Atrial Fibrillation     Patient was admitted thru ER with:  \" Natasha Walls is a 80 y.o. female who presents emergency department as a private walk-in with complaints of shortness of breath and a rapid irregular heartbeat.  Patient does have a history of congestive heart failure and atrial fibrillation.  She also has a COPD patient and smoked up until recently. Renetta Dutotn has had a history of a cardioversion in the past.  She denies chest pain at this point in time.  She denies nausea vomiting or diaphoresis. \"      The patient reports that she is responding to treatment  She is less short of breath  No chest pain     Initial database has included:  WBC22.0 High k/uL RBC4.63m/uL Ejqdzsmeus41.9      Pro-BNP1,296 High       EKG:  Atrial fibrillation   Left axis deviation   Right bundle branch block   Anterior infarct , age undetermined   T wave abnormality, consider lateral ischemia   Abnormal ECG   When compared with ECG of 03-OCT-2021 17:45,   Anterior infarct is now Present      Blood culture negative     Chest x-ray:  Impression:  Mild cardiomegaly and pulmonary vascular congestion new since previous. Changes of COPD.          Review of Systems:     Constitutional:  negative for chills, fevers, sweats,+ fatigue-improved  Respiratory:  negative for cough, + shortness of breath, denies wheezing  Cardiovascular:  negative for chest pain, chest pressure/discomfort, lower extremity edema, + palpitations-improved  Gastrointestinal:  negative for abdominal pain, constipation, diarrhea, nausea, vomiting  Neurological:  negative for dizziness, headache    Medications: Allergies:     Allergies   Allergen Reactions    Gabapentin Other (See Comments)     Other reaction(s): hallucinating/jerking    Keflex [Cephalexin] Anaphylaxis, Hives and Swelling    Tegretol [Carbamazepine] Anaphylaxis    Adhesive Tape Rash    Demerol Hcl [Meperidine] Nausea And Vomiting    Flagyl [Metronidazole] Nausea And Vomiting       Current Meds:   Scheduled Meds:    furosemide  20 mg Oral BID    ipratropium-albuterol  1 ampule Inhalation BID    tetrahydrozoline  2 drop Both Eyes TID    apixaban  2.5 mg Oral BID    dilTIAZem  300 mg Oral Daily    metoprolol succinate  25 mg Oral Daily    spironolactone  25 mg Oral Daily    [Held by provider] lisinopril  5 mg Oral Daily    sodium chloride flush  5-40 mL IntraVENous 2 times per day     Continuous Infusions:    sodium chloride       PRN Meds: sodium chloride, acetaminophen **OR** acetaminophen, ondansetron **OR** ondansetron, polyethylene glycol, sodium chloride flush, bisacodyl, ipratropium-albuterol, diazePAM, polyvinyl alcohol    Data:     Past Medical History:   has a past medical history of A-fib ([facility] Atrial fibrillation ([facility] CHF (congestive heart failure) ([facility] COPD (chronic obstructive pulmonary disease) ([facility] Diverticulosis, TIMOTEO (generalized anxiety disorder), H/O blood clots, History of cardioversion, Liver cyst, Macular degeneration, Pneumonia, Rectocele, and Stage 3b chronic kidney disease ([facility] Social History:   reports that she quit smoking about 15 years ago. Her smoking use included cigarettes. She has never used smokeless tobacco. She reports that she does not drink alcohol and does not use drugs. Family History:   Family History   Family history unknown: Yes       Vitals:  BP (!) 98/46   Pulse 55   Temp 97.2 °F (36.2 °C) (Oral)   Resp 16   Ht 4' 11\" (1.499 m)   Wt 121 lb (54.9 kg)   SpO2 98%   BMI 24.44 kg/m²   Temp (24hrs), Av.7 °F (36.5 °C), Min:97.2 °F (36.2 °C), Max:98.6 °F (37 °C)    No results for input(s): POCGLU in the last 72 hours. I/O (24Hr):     Intake/Output Summary (Last 24 hours) at 2021 1147  Last data filed at 2021 0649  Gross per 24 hour   Intake --   Output 1300 ml   Net -1300 ml       Labs:  Hematology:  Recent Labs     21  0632   WBC 8.7   RBC 4.32   HGB 13.4   HCT 41.3   MCV 95.6   MCH 31.0   MCHC 32.4   RDW 12.7      MPV 9.6     Chemistry:  Recent Labs     21  0632 21  0619 21  0541    140 138   K 4.1 4.2 4.4    99 97*   CO2 26 28 31   GLUCOSE 89 112* 107*   BUN 37* 44* 42*   CREATININE 1.36* 1.43* 1.30*   MG 2.0 2.1  --    ANIONGAP 16 13 10   LABGLOM 37* 35* 39*   GFRAA 45* 42* 47*   CALCIUM 9.2 9.4 9.1   PROBNP 1,885*  --  1,496*     Recent Labs     12/21/21  0632   TSH 0.27*   CHOL 165   HDL 37*   LDLCHOLESTEROL 111   CHOLHDLRATIO 4.5   TRIG 84   VLDL NOT REPORTED     ABG:No results found for: POCPH, PHART, PH, POCPCO2, MIA6RCK, PCO2, POCPO2, PO2ART, PO2, POCHCO3, WTT5BZB, HCO3, NBEA, PBEA, BEART, BE, THGBART, THB, YZD7ERN, MJDI4UMV, P6IMHCJX, O2SAT, FIO2  Lab Results   Component Value Date/Time    SPECIAL RIGHT ANTECUBE 20CC 12/20/2021 09:00 AM     Lab Results   Component Value Date/Time    CULTURE NO GROWTH 2 DAYS 12/20/2021 09:00 AM       Radiology:  XR CHEST (2 VW)    Result Date: 12/21/2021  No significant change     XR CHEST PORTABLE    Result Date: 12/20/2021  Mild cardiomegaly and pulmonary vascular congestion new since previous. Changes of COPD. Echocardiogram  Summary  Left ventricle is normal in size and wall thickness. Global left ventricular systolic function is normal with an estimated  ejection fraction of 55 % . No obvious wall motion abnormality seen. Bi-atrial enlargement. Pacemaker / ICD lead seen in RV & RA. Aortic leaflets show calcification without restriction of motion. Thickened mitral valve leaflets. Mild mitral regurgitation. Mild tricuspid regurgitation.   No significant pericardial effusion is seen  Physical Examination:        General appearance:  alert, cooperative and no distress  Mental Status:  oriented to person, place and time and anxious affect  Lungs: Diminished breath sounds at bases, few bibasilar Rales  Heart: Irregular rhythm, no murmur,+ pacemaker  Abdomen:  soft, nontender, nondistended, normal bowel sounds, no masses, hepatomegaly, splenomegaly  Extremities:  no edema, redness, tenderness in the calves  Skin:  no gross lesions, rashes, induration    Assessment:        Hospital Problems           Last Modified POA    * (Principal) Acute on chronic diastolic congestive heart failure ([address] 12/23/2021 Yes 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 COPD without exacerbation ([address] 12/20/2021 Yes    Stage 3b chronic kidney disease ([address] 12/20/2021 Yes    TIMOTEO (generalized anxiety disorder) 12/20/2021 Yes    Acute respiratory distress 12/20/2021 Yes    Chronic anticoagulation 12/20/2021 Yes    Mild malnutrition ([address] 12/21/2021 Yes          Plan:        1. Continue oral Lasix 20 mg 2 times daily   2. Continue Toprol-XL 25 mg daily  3. Restart lisinopril after 5 days  4. Continue home dose of Eliquis  5. MiraLAX as needed  6. Low-sodium diet  7. Clear eyedrops to be continued as needed  8. Continue 2 L oxygen at home as before  9.  Discharge home, she has been refusing home care    Parminder Keys MD  12/23/2021  11:47 AM

## 2021-12-23 NOTE — RT PROTOCOL NOTE
RT Inhaler-Nebulizer Bronchodilator Protocol Note    There is a bronchodilator order in the chart from a provider indicating to follow the RT Bronchodilator Protocol and there is an Initiate RT Inhaler-Nebulizer Bronchodilator Protocol order as well (see protocol at bottom of note). CXR Findings:  No results found. The findings from the last RT Protocol Assessment were as follows:   History Pulmonary Disease: Chronic pulmonary disease  Respiratory Pattern: Regular pattern and RR 12-20 bpm  Breath Sounds: Slightly diminished and/or crackles  Cough: Strong, spontaneous, non-productive  Indication for Bronchodilator Therapy: Decreased or absent breath sounds  Bronchodilator Assessment Score: 4    Aerosolized bronchodilator medication orders have been revised according to the RT Inhaler-Nebulizer Bronchodilator Protocol below. Respiratory Therapist to perform RT Therapy Protocol Assessment initially then follow the protocol. Repeat RT Therapy Protocol Assessment PRN for score 0-3 or on second treatment, BID, and PRN for scores above 3. No Indications - adjust the frequency to every 6 hours PRN wheezing or bronchospasm, if no treatments needed after 48 hours then discontinue using Per Protocol order mode. If indication present, adjust the RT bronchodilator orders based on the Bronchodilator Assessment Score as indicated below. Use Inhaler orders unless patient has one or more of the following: on home nebulizer, not able to hold breath for 10 seconds, is not alert and oriented, cannot activate and use MDI correctly, or respiratory rate 25 breaths per minute or more, then use the equivalent nebulizer order(s) with same Frequency and PRN reasons based on the score. If a patient is on this medication at home then do not decrease Frequency below that used at home.     0-3 - enter or revise RT bronchodilator order(s) to equivalent RT Bronchodilator order with Frequency of every 4 hours PRN for wheezing or increased work of breathing using Per Protocol order mode. 4-6 - enter or revise RT Bronchodilator order(s) to two equivalent RT bronchodilator orders with one order with BID Frequency and one order with Frequency of every 4 hours PRN wheezing or increased work of breathing using Per Protocol order mode. 7-10 - enter or revise RT Bronchodilator order(s) to two equivalent RT bronchodilator orders with one order with TID Frequency and one order with Frequency of every 4 hours PRN wheezing or increased work of breathing using Per Protocol order mode. 11-13 - enter or revise RT Bronchodilator order(s) to one equivalent RT bronchodilator order with QID Frequency and an Albuterol order with Frequency of every 4 hours PRN wheezing or increased work of breathing using Per Protocol order mode. Greater than 13 - enter or revise RT Bronchodilator order(s) to one equivalent RT bronchodilator order with every 4 hours Frequency and an Albuterol order with Frequency of every 2 hours PRN wheezing or increased work of breathing using Per Protocol order mode. RT to enter RT Home Evaluation for COPD & MDI Assessment order using Per Protocol order mode.     Electronically signed by Shital Armenta RCP on 12/23/2021 at 9:33 AM

## 2021-12-23 NOTE — DISCHARGE SUMMARY
[facility]  Office: [address], DO, Dominique [place], DO, Laura Adamson, DO, Alida Molina Blood, DO, Darryl Pena MD, Perez Damon MD, Tammi Aguilar MD, Ramya Mccarthy MD, Maria Teresa Anderson MD, Braulio Correa MD, Horace Rodriguez MD, Clarke Ramirez, DO, Zainab Fisher, DO, Bernarda Morales MD,  Meenakshi Friday, DO, Diane Daniels MD, Arcelia Corbin MD, Yuliet Mendoza MD, Ayanna Ahn MD, Zuleima Shi MD, Donovan Segundo MD, Adeline Goldmann, MD, Adwoa Carpio, [facility], [facility], [facility], Ash Nurse, CNP, Pedro Moise, CNS, Myriam Arora, CNP, Valeria Ford, CNP, Nicolasa Rodriguez, CNP, Lorie Whelan, CNP, Pura Camara, CNP, Fabian Alston PA-C, Neeta Mccann, DNP, Levi Carcamo, DNP, Nehemiah Frank, CNP, July [place], CNP, Cathryn Squibb, CNP, Rosemary Garcia, CNP, Nain Schroeder, [facility], [facility]    Discharge Summary     Patient ID: Dolores Shelley  :  1935   MRN: [id number]     ACCOUNT:  [de-identified]   Patient's PCP: Esha Cole MD  Admit Date: 2021   Discharge Date: 2021     Length of Stay: 3  Code Status:  Full Code  Admitting Physician: Yesenia Bull MD  Discharge Physician: Yesenia Bull MD     Active Discharge Diagnoses:     Hospital Problem Lists:  Principal Problem:    Acute on chronic diastolic congestive heart failure [facility])  Active Problems:    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  female who presents with Shortness of Breath and Atrial Fibrillation     Patient was admitted thru ER with:  Edison Vincent a 80 y.o. female who presents emergency department as a private walk-in with complaints of shortness of breath and a rapid irregular heartbeat.  Patient does have a history of congestive heart failure and atrial fibrillation.  She also has a COPD patient and smoked up until recently. Parmjit Harvey has had a history of a cardioversion in the past.  She denies chest pain at this point in time.  She denies nausea vomiting or diaphoresis. \"      The patient reports that she is responding to treatment  She is less short of breath  No chest pain     Initial database has included:  WBC22.0 High k/uL RBC4.63m/uL [id number]      Pro-BNP1,296 High       EKG:  Atrial fibrillation   Left axis deviation   Right bundle branch block   Anterior infarct , age undetermined   T wave abnormality, consider lateral ischemia   Abnormal ECG   When compared with ECG of 03-OCT-2021 17:45,   Anterior infarct is now Present      Blood culture negative     Chest x-ray:  Impression:  Mild cardiomegaly and pulmonary vascular congestion new since previous. Changes of COPD.        Hospital Course:    Shortness of breath is better with oral and IV diuresis  Creatinine has started trending down after changing Lasix to oral-1.30 today  Blood pressure 98/46, heart rate 55, saturating 98 % on 2 L oxygen via nasal cannula, uses same at home  Feels comfortable and wants to go home today     Plan:         1. Continue oral Lasix 20 mg 2 times daily   2. Continue Toprol-XL 25 mg daily  3. Restart lisinopril after 5 days  4. Continue home dose of Eliquis  5. MiraLAX as needed  6. Low-sodium diet  7. Clear eyedrops to be continued as needed  8. Continue 2 L oxygen at home as before  9.  Discharge home, she has been refusing home care          Significant therapeutic interventions: See above notes    Significant Diagnostic Studies:   Labs / Micro:  CBC:   Lab Results   Component Value Date    WBC 8.7 12/21/2021    RBC 4.32 12/21/2021    HGB 13.4 12/21/2021    HCT 41.3 12/21/2021    MCV 95.6 12/21/2021    MCH 31.0 12/21/2021    MCHC 32.4 12/21/2021    RDW 12.7 12/21/2021     12/21/2021     BMP:    Lab Results   Component Value Date    GLUCOSE 107 12/23/2021     12/23/2021    K 4.4 12/23/2021    CL 97 12/23/2021    CO2 31 12/23/2021    ANIONGAP 10 12/23/2021    BUN 42 12/23/2021    CREATININE 1.30 12/23/2021    BUNCRER 32 12/23/2021    CALCIUM 9.1 12/23/2021    LABGLOM 39 12/23/2021    GFRAA 47 12/23/2021    GFR      12/23/2021    GFR NOT REPORTED 12/23/2021     HFP:    Lab Results   Component Value Date    PROT 7.2 10/03/2021     CMP:    Lab Results   Component Value Date    GLUCOSE 107 12/23/2021     12/23/2021    K 4.4 12/23/2021    CL 97 12/23/2021    CO2 31 12/23/2021    BUN 42 12/23/2021    CREATININE 1.30 12/23/2021    ANIONGAP 10 12/23/2021    ALKPHOS 124 10/03/2021    ALT 7 10/03/2021    AST 15 10/03/2021    BILITOT 0.40 10/03/2021    LABALBU 4.3 10/03/2021    ALBUMIN 1.5 10/03/2021    LABGLOM 39 12/23/2021    GFRAA 47 12/23/2021    GFR      12/23/2021    GFR NOT REPORTED 12/23/2021    PROT 7.2 10/03/2021    CALCIUM 9.1 12/23/2021     PT/INR:    Lab Results   Component Value Date    PROTIME 10.1 12/20/2021    INR 1.0 12/20/2021     PTT: No results found for: APTT  FLP:    Lab Results   Component Value Date    CHOL 165 12/21/2021    TRIG 84 12/21/2021    HDL 37 12/21/2021     U/A:    Lab Results   Component Value Date    COLORU YELLOW 10/11/2016    TURBIDITY CLEAR 10/11/2016    SPECGRAV 1.021 10/11/2016    HGBUR NEGATIVE 10/11/2016    PHUR 6.0 10/11/2016    PROTEINU NEGATIVE 10/11/2016    GLUCOSEU NEGATIVE 10/11/2016    KETUA NEGATIVE 10/11/2016    BILIRUBINUR NEGATIVE 10/11/2016    UROBILINOGEN Normal 10/11/2016    NITRU NEGATIVE 10/11/2016    LEUKOCYTESUR TRACE 10/11/2016     TSH:    Lab Results   Component Value Date    TSH 0.27 12/21/2021        Radiology:  XR CHEST (2 VW)    Result Date: 12/21/2021  No significant change     XR CHEST PORTABLE    Result Date: 12/20/2021  Mild cardiomegaly and pulmonary vascular congestion new since previous. Changes of COPD. Consultations:    Consults:     Final Specialist Recommendations/Findings:   IP CONSULT TO DIETITIAN  IP CONSULT TO HEART FAILURE NURSE/COORDINATOR      The patient was seen and examined on day of discharge and this discharge summary is in conjunction with any daily progress note from day of discharge.     Discharge plan:     Disposition: Home    Physician Follow Up:   PCP in 1 week    Requiring Further Evaluation/Follow Up POST HOSPITALIZATION/Incidental Findings: Continue Lasix at home as recommended    Diet: cardiac diet    Activity: As tolerated    Instructions to Patient: Use oxygen at home on previous settings    Discharge Medications:      Medication List      STOP taking these medications    metoprolol succinate 25 MG extended release tablet  Commonly known as: TOPROL XL        ASK your doctor about these medications    bisacodyl 10 MG suppository  Commonly known as: DULCOLAX     Cardizem  MG extended release capsule  Generic drug: dilTIAZem     clotrimazole-betamethasone 1-0.05 % lotion  Commonly known as: Izola Babe  Ask about: Which instructions should I use?     diazePAM 5 MG tablet  Commonly known as: VALIUM     dicyclomine 10 MG capsule  Commonly known as: BENTYL     Eliquis 2.5 MG Tabs tablet  Generic drug: apixaban  Ask about: Which instructions should I use?     furosemide 20 MG tablet  Commonly known as: LASIX  Ask about: Which instructions should I use?     metoprolol tartrate 25 MG tablet  Commonly known as: LOPRESSOR     oxyCODONE-acetaminophen 5-325 MG per tablet  Commonly known as: PERCOCET     polyethylene glycol 17 g packet  Commonly known as: GLYCOLAX     spironolactone 25 MG tablet  Commonly known as: ALDACTONE     zolpidem 10 MG tablet  Commonly known as: AMBIEN            No discharge procedures on file.    Time Spent on discharge is  31 mins in patient examination, evaluation, counseling as well as medication reconciliation, prescriptions for required medications, discharge plan and follow up. Electronically signed by   Abel Umana MD  12/23/2021  11:49 AM      Thank you Dr. Zay Mesa MD for the opportunity to be involved in this patient's care.

## 2021-12-23 NOTE — CARE COORDINATION
DC Planning    Spoke with pt, she lives with her dtr. She has home 02 with portability. Declines home care, ambulating well. Her [place] her up at [place]. He has capability of bringing her tank.

## 2021-12-23 NOTE — PROGRESS NOTES
Occupational Therapy  Facility/Department: [facility]  Daily Treatment Note  NAME: Farrah Walton  : 1935  MRN: [id number]    Date of Service: 2021    Discharge Recommendations:  Patient would benefit from continued therapy after discharge     Due to recent hospitalization and medical condition, pt would benefit from additional therapy at time of discharge to ensure safety. Please refer to the AM-PAC score for current functional status. OT Equipment Recommendations  Equipment Needed: Yes  Mobility Devices: ADL Assistive Devices  ADL Assistive Devices: Long-handled Shoe Horn;Emergency Alert System;Long-handled Sponge;Reacher;Sock-Aid Hard    Assessment   Performance deficits / Impairments: Decreased functional mobility ; Decreased ADL status; Decreased safe awareness;Decreased strength;Decreased endurance;Decreased high-level IADLs;Decreased fine motor control;Decreased cognition;Decreased vision/visual deficit; Decreased posture;Decreased coordination;Decreased balance  Assessment: Pt would benefit from continued skilled OT services to increase I and safety during functional tasks to return home at prior level of function as able. Prognosis: Good  OT Education: OT Role;Transfer Training;Plan of Care;Energy Conservation; ADL Adaptive Strategies;Precautions;Orientation  REQUIRES OT FOLLOW UP: Yes  Activity Tolerance  Activity Tolerance: Patient limited by fatigue  Safety Devices  Safety Devices in place: Yes  Type of devices: Nurse notified;Gait belt;Bed alarm in place;Call light within reach; Patient at risk for falls; Left in bed         Patient Diagnosis(es): The primary encounter diagnosis was Congestive heart failure, unspecified HF chronicity, unspecified heart failure type ([facility] [address] A diagnosis of Acute respiratory distress was also pertinent to this visit.       has a past medical history of A-fib [facility]), Atrial fibrillation ([facility] [address] CHF (congestive heart failure) ([facility] [address] COPD (chronic obstructive pulmonary disease) ([facility] [address] Diverticulosis, TIMOTEO (generalized anxiety disorder), H/O blood clots, History of cardioversion, Liver cyst, Macular degeneration, Pneumonia, Rectocele, and Stage 3b chronic kidney disease ([facility] [address] has a past surgical history that includes ablation of dysrhythmic focus; Pacemaker insertion; Femur Surgery (Right); Appendectomy; Hysterectomy; and Cataract removal (Bilateral). Restrictions  Restrictions/Precautions  Restrictions/Precautions: General Precautions,Fall Risk  Required Braces or Orthoses?: No  Position Activity Restriction  Other position/activity restrictions: up with assist, telemetry, LUE IV, low sodium diet  Subjective   General  Chart Reviewed: Yes  Patient assessed for rehabilitation services?: Yes  Response to previous treatment: Patient with no complaints from previous session  Family / Caregiver Present: No  Subjective  Subjective: \"I feel weak. \"  General Comment  Comments: Patient agreeable for OT treatment session      Orientation  Orientation  Overall Orientation Status: Within Functional Limits  Objective    ADL  Additional Comments: Patient refused to get out of bed today. Patient required emotional support this am and talked about her past medical history. Cognition  Overall Cognitive Status: Exceptions  Arousal/Alertness: Appropriate responses to stimuli  Following Commands: Follows multistep commands with repitition; Follows multistep commands with increased time  Attention Span: Appears intact  Memory: Appears intact  Safety Judgement: Decreased awareness of need for assistance;Decreased awareness of need for safety  Problem Solving: Decreased awareness of errors;Assistance required to identify errors made;Assistance required to correct errors made  Insights: Decreased awareness of deficits  Initiation: Does not require cues  Sequencing: Does not require cues  Cognition Comment: SLUMS cog assessment completed.  Patient scored a 21/30 which correlates to the Mild Cognitive deficit per the SLUMS scoring key. Plan   Plan  Times per week: 4-5x/wk 1x/day as masha  Times per day: Daily  Current Treatment Recommendations: Eduar Jorge Luis Training,Functional Mobility Training,Safety Education & Training,Pain Management,Home Management Training,Self-Care / ADL,Equipment Evaluation, Education, & procurement    AM-PAC Score        AM-PAC Inpatient Daily Activity Raw Score: 19 (12/23/21 1042)  AM-PAC Inpatient ADL T-Scale Score : 40.22 (12/23/21 1042)  ADL Inpatient CMS 0-100% Score: 42.8 (12/23/21 1042)  ADL Inpatient CMS G-Code Modifier : CK (12/23/21 1042)    Goals  Short term goals  Time Frame for Short term goals: By discharge, pt to demo  Short term goal 1: ADL transfers and functional mobility to Mod I with use of AD as needed. Short term goal 2: bed mobility to Mod I with use of bedrails as needed. Short term goal 3: increased B UE strength by 1/2 grade to assist with self care tasks/I with B UE HEP with use of handouts as needed. Short term goal 4: UB ADLs to Set up and LB ADLs to SBA with use of AD/AE as needed. Short term goal 5: toileting to SBA with use of grab bars/BSC as needed. Long term goals  Long term goal 1: Pt to be I with fall prevention edu, recommendations for AE/discharge, EC/WS tech with use of handouts as needed. Patient Goals   Patient goals : To go home! Therapy Time   Individual Concurrent Group Co-treatment   Time In 3036         Time Out 8118         Minutes 24           Upon writer exit, call light within reach, pt retired to bed. All lines intact and patient positioned comfortably. All patient needs addressed prior to ending therapy session. Chart reviewed prior to treatment and patient is agreeable for therapy. RN reports patient is medically stable for therapy treatment this date.     HANNAH Cook/ABHI

## 2021-12-23 NOTE — PLAN OF CARE
Problem: Falls - Risk of:  Goal: Will remain free from falls  12/23/2021 0216 by Reena Pedraza RN  Outcome: Ongoing     Problem: OXYGENATION/RESPIRATORY FUNCTION  Goal: Patient will maintain patent airway  12/23/2021 0216 by Reena Pedraza RN  Outcome: Ongoing     Problem: OXYGENATION/RESPIRATORY FUNCTION  Goal: Patient will achieve/maintain normal respiratory rate/effort  12/23/2021 0216 by Reena Pedraza RN  Outcome: Ongoing     Problem: HEMODYNAMIC STATUS  Goal: Patient has stable vital signs and fluid balance  12/23/2021 0216 by Reena Pedraza RN  Outcome: Ongoing     Problem: FLUID AND ELECTROLYTE IMBALANCE  Goal: Fluid and electrolyte balance are achieved/maintained  12/23/2021 0216 by Reena Pedraza RN  Outcome: Ongoing

## 2021-12-23 NOTE — PROGRESS NOTES
Screening  Patient Currently in Pain: Denies  Pain Assessment  Pain Assessment: 0-10  Pain Level: 4  Pain Location: Generalized  Vital Signs  Patient Currently in Pain: Denies       Orientation     Cognition      Objective   Bed mobility  Scooting: Contact guard assistance  Transfers  Sit to Stand: Contact guard assistance  Stand to sit: Contact guard assistance  Stand Pivot Transfers: Contact guard assistance  Ambulation  Ambulation?: Yes  Ambulation 1  Surface: level tile  Device: No Device  Assistance: Contact guard assistance  Quality of Gait: fair steadiness  Gait Deviations: Slow Nadia; Shuffles  Distance: 120 ft     Balance  Posture: Fair  Sitting - Static: Good  Sitting - Dynamic: Good  Standing - Static: Good;-  Standing - Dynamic: Fair;+            Comment: assisted pt getting dressed for discharge home                G-Code     OutComes Score                                                     AM-PAC Score  AM-PAC Inpatient Mobility Raw Score : 18 (12/23/21 [facility])  AM-PAC Inpatient T-Scale Score : 43.63 (12/23/21 [facility])  Mobility Inpatient CMS 0-100% Score: 46.58 (12/23/21 [facility])  Mobility Inpatient CMS G-Code Modifier : CK (12/23/21 [facility])          Goals  Short term goals  Time Frame for Short term goals: 6 visits  Short term goal 1: Pt to demonstrate bed mobility independently  Short term goal 2: Pt to perform STS transfers w/o AD independently  Short term goal 3: Pt to ambulate at least 150ft w/o AD independently  Short term goal 4: Pt to ascend/descend 2 stairs w/ handrails CGA  Short term goal 5: Pt to actively participate in at least 30 minutes of physical therapy for ther act, ther ex, balance, gait, and stair training  Patient Goals   Patient goals :  To go home    Plan    Plan  Times per week: 1-2x/day, 5-6x/week  Current Treatment Recommendations: Strengthening,Balance Training,Functional Mobility Training,Stair training,Gait Training,Transfer Training,Endurance Training,Patient/Caregiver Education & Training,Equipment Evaluation, Education, & procurement,Home Exercise Program,Safety Education & Training  Safety Devices  Type of devices: Bed alarm in place,Call light within reach,Gait belt,Nurse notified,Left in bed  Restraints  Initially in place: No     Therapy Time   Individual Concurrent Group Co-treatment   Time In  1249         Time Out  1312         Minutes  23                 [address], [place]

## 2021-12-25 LAB
CULTURE: NORMAL
CULTURE: NORMAL
Lab: NORMAL
Lab: NORMAL
SPECIMEN DESCRIPTION: NORMAL
SPECIMEN DESCRIPTION: NORMAL

## 2022-12-08 ENCOUNTER — APPOINTMENT (OUTPATIENT)
Dept: CT IMAGING | Facility: CLINIC | Age: 87
End: 2022-12-08
Payer: COMMERCIAL

## 2022-12-08 ENCOUNTER — HOSPITAL ENCOUNTER (EMERGENCY)
Facility: CLINIC | Age: 87
Discharge: HOME | End: 2022-12-08
Attending: STUDENT IN AN ORGANIZED HEALTH CARE EDUCATION/TRAINING PROGRAM
Payer: COMMERCIAL

## 2022-12-08 VITALS
DIASTOLIC BLOOD PRESSURE: 78 MMHG | HEART RATE: 105 BPM | OXYGEN SATURATION: 97 % | SYSTOLIC BLOOD PRESSURE: 107 MMHG | RESPIRATION RATE: 18 BRPM | TEMPERATURE: 97.9 F

## 2022-12-08 DIAGNOSIS — N30.00 ACUTE CYSTITIS WITHOUT HEMATURIA: Primary | ICD-10-CM

## 2022-12-08 LAB
ABSOLUTE EOS #: 0 K/UL (ref 0–0.4)
ABSOLUTE LYMPH #: 1.3 K/UL (ref 1–4.8)
ABSOLUTE MONO #: 0.7 K/UL (ref 0.1–1.2)
ANION GAP SERPL CALCULATED.3IONS-SCNC: 8 MMOL/L (ref 9–17)
BACTERIA: ABNORMAL
BASOPHILS # BLD: 0 % (ref 0–2)
BASOPHILS ABSOLUTE: 0 K/UL (ref 0–0.2)
BILIRUBIN URINE: NEGATIVE
BUN BLDV-MCNC: 26 MG/DL (ref 8–23)
CALCIUM SERPL-MCNC: 9.5 MG/DL (ref 8.6–10.4)
CHLORIDE BLD-SCNC: 102 MMOL/L (ref 98–107)
CO2: 29 MMOL/L (ref 20–31)
COLOR: YELLOW
CREAT SERPL-MCNC: 1 MG/DL (ref 0.5–0.9)
EOSINOPHILS RELATIVE PERCENT: 1 % (ref 1–4)
EPITHELIAL CELLS UA: ABNORMAL /HPF (ref 0–5)
FLU A ANTIGEN: NEGATIVE
FLU B ANTIGEN: NEGATIVE
GFR SERPL CREATININE-BSD FRML MDRD: 55 ML/MIN/1.73M2
GLUCOSE BLD-MCNC: 101 MG/DL (ref 70–99)
GLUCOSE URINE: NEGATIVE
HCT VFR BLD CALC: 46.6 % (ref 36–46)
HEMOGLOBIN: 15.5 G/DL (ref 12–16)
KETONES, URINE: NEGATIVE
LEUKOCYTE ESTERASE, URINE: ABNORMAL
LYMPHOCYTES # BLD: 16 % (ref 24–44)
MCH RBC QN AUTO: 32 PG (ref 26–34)
MCHC RBC AUTO-ENTMCNC: 33.2 G/DL (ref 31–37)
MCV RBC AUTO: 96.4 FL (ref 80–100)
MONOCYTES # BLD: 9 % (ref 2–11)
MUCUS: ABNORMAL
NITRITE, URINE: NEGATIVE
PDW BLD-RTO: 13.4 % (ref 12.5–15.4)
PH UA: 5 (ref 5–8)
PLATELET # BLD: 285 K/UL (ref 140–450)
PMV BLD AUTO: 7.9 FL (ref 6–12)
POTASSIUM SERPL-SCNC: 5.1 MMOL/L (ref 3.7–5.3)
PROTEIN UA: NEGATIVE
RBC # BLD: 4.84 M/UL (ref 4–5.2)
RBC UA: ABNORMAL /HPF (ref 0–2)
SEG NEUTROPHILS: 74 % (ref 36–66)
SEGMENTED NEUTROPHILS ABSOLUTE COUNT: 6.1 K/UL (ref 1.8–7.7)
SODIUM BLD-SCNC: 139 MMOL/L (ref 135–144)
SPECIFIC GRAVITY UA: 1.02 (ref 1–1.03)
TROPONIN, HIGH SENSITIVITY: 20 NG/L (ref 0–14)
TROPONIN, HIGH SENSITIVITY: 24 NG/L (ref 0–14)
TURBIDITY: ABNORMAL
URINE HGB: ABNORMAL
UROBILINOGEN, URINE: NORMAL
WBC # BLD: 8.2 K/UL (ref 3.5–11)
WBC UA: ABNORMAL /HPF (ref 0–5)

## 2022-12-08 PROCEDURE — 96361 HYDRATE IV INFUSION ADD-ON: CPT

## 2022-12-08 PROCEDURE — 87086 URINE CULTURE/COLONY COUNT: CPT

## 2022-12-08 PROCEDURE — 80048 BASIC METABOLIC PNL TOTAL CA: CPT

## 2022-12-08 PROCEDURE — 87804 INFLUENZA ASSAY W/OPTIC: CPT

## 2022-12-08 PROCEDURE — 85025 COMPLETE CBC W/AUTO DIFF WBC: CPT

## 2022-12-08 PROCEDURE — 96360 HYDRATION IV INFUSION INIT: CPT

## 2022-12-08 PROCEDURE — 6370000000 HC RX 637 (ALT 250 FOR IP): Performed by: STUDENT IN AN ORGANIZED HEALTH CARE EDUCATION/TRAINING PROGRAM

## 2022-12-08 PROCEDURE — 93005 ELECTROCARDIOGRAM TRACING: CPT | Performed by: STUDENT IN AN ORGANIZED HEALTH CARE EDUCATION/TRAINING PROGRAM

## 2022-12-08 PROCEDURE — 74176 CT ABD & PELVIS W/O CONTRAST: CPT

## 2022-12-08 PROCEDURE — 6370000000 HC RX 637 (ALT 250 FOR IP): Performed by: EMERGENCY MEDICINE

## 2022-12-08 PROCEDURE — 81001 URINALYSIS AUTO W/SCOPE: CPT

## 2022-12-08 PROCEDURE — 2580000003 HC RX 258: Performed by: STUDENT IN AN ORGANIZED HEALTH CARE EDUCATION/TRAINING PROGRAM

## 2022-12-08 PROCEDURE — 84484 ASSAY OF TROPONIN QUANT: CPT

## 2022-12-08 PROCEDURE — 36415 COLL VENOUS BLD VENIPUNCTURE: CPT

## 2022-12-08 PROCEDURE — 99284 EMERGENCY DEPT VISIT MOD MDM: CPT

## 2022-12-08 RX ORDER — CIPROFLOXACIN 500 MG/1
500 TABLET, FILM COATED ORAL 2 TIMES DAILY
Qty: 20 TABLET | Refills: 0 | Status: SHIPPED | OUTPATIENT
Start: 2022-12-08 | End: 2022-12-18

## 2022-12-08 RX ORDER — CIPROFLOXACIN 250 MG/1
500 TABLET, FILM COATED ORAL ONCE
Status: COMPLETED | OUTPATIENT
Start: 2022-12-08 | End: 2022-12-08

## 2022-12-08 RX ORDER — 0.9 % SODIUM CHLORIDE 0.9 %
1000 INTRAVENOUS SOLUTION INTRAVENOUS ONCE
Status: COMPLETED | OUTPATIENT
Start: 2022-12-08 | End: 2022-12-08

## 2022-12-08 RX ORDER — ASPIRIN 81 MG/1
324 TABLET, CHEWABLE ORAL ONCE
Status: COMPLETED | OUTPATIENT
Start: 2022-12-08 | End: 2022-12-08

## 2022-12-08 RX ADMIN — CIPROFLOXACIN 500 MG: 250 TABLET, FILM COATED ORAL at 16:20

## 2022-12-08 RX ADMIN — SODIUM CHLORIDE 1000 ML: 9 INJECTION, SOLUTION INTRAVENOUS at 13:03

## 2022-12-08 RX ADMIN — ASPIRIN 81 MG 324 MG: 81 TABLET ORAL at 14:03

## 2022-12-08 ASSESSMENT — ENCOUNTER SYMPTOMS
DIARRHEA: 1
COLOR CHANGE: 0
BACK PAIN: 0
BLOOD IN STOOL: 0
COUGH: 0
NAUSEA: 1
RHINORRHEA: 0
CONSTIPATION: 0
SHORTNESS OF BREATH: 0
VOMITING: 1
ABDOMINAL PAIN: 1
ANAL BLEEDING: 0

## 2022-12-08 NOTE — ED NOTES
Pt states she wants to leave. Pt updated on plan of care and informed that we are still waiting on radiologist for official CT read. Provider notified of pt concerns. Pt respirations are even and unlabored, pt is oriented X 4, speaking in complete sentences, bed is in the lowest position, call light is within reach. Will continue to monitor.        Vinicius Russ RN  12/08/22 2074

## 2022-12-08 NOTE — ED NOTES
The following labs labeled with pt sticker and sent to Lab:     [] Areatha Loraine     [] Blue   [] Green/yellow  [] Drk Green/Grey   [] Pink  [] Red  [] Yellow     [] Blood Cultures     [] COVID-19 swab    [] Rapid   [] Viral Swab  [] Wound Swab    [x] Urine Sample  [] Pelvic Cultures               Jeniffer Mario RN  12/08/22 8904

## 2022-12-08 NOTE — ED PROVIDER NOTES
[facility]  [address] [place]  Phone: [facility]  EMERGENCY DEPARTMENT ENCOUNTER      Pt Name: Lakshmi Hendrickson  MRN: [id number]  [place] 1935  Date of evaluation: 12/8/2022  Provider: Ralph Garrett DO    CHIEF COMPLAINT       Chief Complaint   Patient presents with    Abdominal Pain     X6 weeks, has a ovarian mass and sees specialist    Diarrhea     Started yesterday    Emesis         HISTORY OF PRESENT ILLNESS   (Location/Symptom, Timing/Onset,Context/Setting, Quality, Duration, Modifying Factors, Severity)  Note limiting factors. Lakshmi Hendrickson is a 80 y.o. female who presents to the emergency department with left-sided acute on chronic abdominal pain, generalized fatigue, intermittent chest pain, nausea, and chronic diarrhea. Symptoms are mildly worse compared to previously and she presents with her family for evaluation. Patient has a history of an ovarian mass as well as a rectocele which have complicated her gastrointestinal symptoms. She denies any fevers or chills, no known sick contacts. She has a history of atrial fibrillation for which she is on anticoagulation. Nursing Notes were reviewed. REVIEW OF SYSTEMS    (2-9systems for level 4, 10 or more for level 5)     Review of Systems   Constitutional:  Positive for fatigue. Negative for chills and fever. HENT:  Negative for congestion and rhinorrhea. Respiratory:  Negative for cough and shortness of breath. Cardiovascular:  Positive for chest pain. Negative for leg swelling. Gastrointestinal:  Positive for abdominal pain, diarrhea, nausea and vomiting. Negative for anal bleeding, blood in stool and constipation. Genitourinary:  Positive for difficulty urinating. Negative for dysuria. Musculoskeletal:  Negative for arthralgias and back pain. Skin:  Negative for color change and wound. Neurological:  Negative for weakness, light-headedness and headaches. Except asnoted above the remainder of the review of systems was reviewed and negative. PAST MEDICAL HISTORY     Past Medical History:   Diagnosis Date    A-fib [facility])     Atrial fibrillation (HCC)     CHF (congestive heart failure) (HCC)     COPD (chronic obstructive pulmonary disease) ([facility] [address]     Diverticulosis     TIMOTEO (generalized anxiety disorder) 10/4/2021    H/O blood clots     right groin and leg. History of cardioversion     Liver cyst     Macular degeneration     Pneumonia     Rectocele     Stage 3b [facility] 10/4/2021         SURGICAL HISTORY       Past Surgical History:   Procedure Laterality Date    ABLATION OF DYSRHYTHMIC FOCUS      APPENDECTOMY      CATARACT REMOVAL Bilateral     FEMUR SURGERY Right     francois in femur    HYSTERECTOMY      PACEMAKER INSERTION           CURRENT MEDICATIONS     Discharge Medication List as of 12/8/2022  4:15 PM        CONTINUE these medications which have NOT CHANGED    Details   lisinopril (PRINIVIL;ZESTRIL) 5 MG tablet Take 1 tablet by mouth daily Restart on 12/28/2021 if blood pressure remains stable, Disp-30 tablet, R-0Normal      metoprolol succinate (TOPROL XL) 25 MG extended release tablet Take 1 tablet by mouth daily, Disp-30 tablet, R-1Normal      furosemide (LASIX) 20 MG tablet Take 1 tablet by mouth 2 times daily, Disp-60 tablet, R-3Normal      tetrahydrozoline 0.05 % ophthalmic solution Place 2 drops into both eyes 3 times daily, Disp-10 mL, R-4Normal      apixaban (ELIQUIS) 2.5 MG TABS tablet Take 2.5 mg by mouth 2 times daily Historical Med      diazePAM (VALIUM) 5 MG tablet Take 5 mg by mouth 3 times daily as needed for Anxiety. Historical Med      polyethylene glycol (GLYCOLAX) 17 g packet Take 17 g by mouth dailyHistorical Med      bisacodyl (DULCOLAX) 10 MG suppository Place 10 mg rectally daily as needed for ConstipationHistorical Med      clotrimazole-betamethasone (LOTRISONE) 1-0.05 % lotion Apply topically 2 times daily, Topical, 2 TIMES DAILY, Historical Med      oxyCODONE-acetaminophen (PERCOCET) 5-325 MG per tablet Take 1 tablet by mouth every 8 hours as needed for Pain. Historical Med      spironolactone (ALDACTONE) 25 MG tablet Take 25 mg by mouth daily Historical Med      dilTIAZem (CARDIZEM CD) 180 MG extended release capsule Take 180 mg by mouth daily Historical Med             ALLERGIES     Gabapentin, Keflex [cephalexin], Tegretol [carbamazepine], Adhesive tape, Demerol hcl [meperidine], and Flagyl [metronidazole]    FAMILY HISTORY       Family History   Family history unknown: Yes          SOCIAL HISTORY       Social History     Socioeconomic History    Marital status: Single   Tobacco Use    Smoking status: Former     Types: Cigarettes     Quit date: 10/11/2006     Years since quittin.1    Smokeless tobacco: Never   Vaping Use    Vaping Use: Never used   Substance and Sexual Activity    Alcohol use: No    Drug use: No       SCREENINGS    [place] Coma Scale  Eye Opening: Spontaneous  Best Verbal Response: Oriented  Best Motor Response: Obeys commands  [place] Coma Scale Score: 15        PHYSICAL EXAM    (up to 7 for level 4, 8 or more for level 5)     ED Triage Vitals   BP Temp Temp Source Heart Rate Resp SpO2 Height Weight   22 1248 22 1258 22 1258 22 1248 22 1248 22 1248 -- --   107/78 97.9 °F (36.6 °C) Oral (!) 117 (!) 36 98 %         Physical Exam  Vitals and nursing note reviewed. Constitutional:       General: She is not in acute distress. Appearance: She is not ill-appearing. HENT:      Head: Normocephalic and atraumatic. Mouth/Throat:      Mouth: Mucous membranes are moist.      Pharynx: Oropharynx is clear. Cardiovascular:      Rate and Rhythm: Tachycardia present. Rhythm irregular. Pulmonary:      Effort: Pulmonary effort is normal.      Breath sounds: Normal breath sounds. Abdominal:      General: Abdomen is flat.  Bowel sounds are normal. Palpations: Abdomen is soft. Tenderness: There is abdominal tenderness in the suprapubic area, left upper quadrant and left lower quadrant. There is no right CVA tenderness or left CVA tenderness. Negative signs include Maddox's sign. Skin:     Capillary Refill: Capillary refill takes less than 2 seconds. Neurological:      General: No focal deficit present. Mental Status: She is alert. Motor: No weakness. EMERGENCY DEPARTMENT COURSE and DIFFERENTIAL DIAGNOSIS/MDM:   Vitals:    Vitals:    12/08/22 1248 12/08/22 1253 12/08/22 1258   BP: 107/78     Pulse: (!) 117 (!) 105    Resp: (!) 36 18    Temp:   97.9 °F (36.6 °C)   TempSrc:   Oral   SpO2: 98% 80        49-year-old clinically old female presenting with mild worsening of her symptoms. She has had intermittent episodes of diarrhea for quite some time given her rectocele and large ovarian mass. Patient also endorses recent nausea and had a single episode of emesis. No fevers or chills, some of the symptoms of cough and pain going on for quite some time. Family concerned about possible infection of some sort given she has seemed to be more tired over the last couple of weeks. Plan for abdominal labs, urinalysis, troponin given intermittent chest pain. EKG shows atrial fibrillation with a rate of 111, however on the monitor her rate is much better controlled. Influenza swab ordered as well. First troponin mildly elevated, awaiting repeat troponin. Awaiting CT abdomen pelvis as well. Patient signed out to Dr. Jose Daniel Pandey, awaiting repeat troponin, CT abdomen pelvis as well as urinalysis.       DIAGNOSTIC RESULTS     LABS:  Labs Reviewed   CBC WITH AUTO DIFFERENTIAL - Abnormal; Notable for the following components:       Result Value    Hematocrit 46.6 (*)     Seg Neutrophils 74 (*)     Lymphocytes 16 (*)     All other components within normal limits   BASIC METABOLIC PANEL - Abnormal; Notable for the following components:    Glucose 101 (*) BUN 26 (*)     Creatinine 1.00 (*)     Est, Glom Filt Rate 55 (*)     Anion Gap 8 (*)     All other components within normal limits   URINALYSIS - Abnormal; Notable for the following components:    Turbidity UA SLIGHTLY CLOUDY (*)     Urine Hgb TRACE (*)     Leukocyte Esterase, Urine MODERATE (*)     All other components within normal limits   TROPONIN - Abnormal; Notable for the following components:    Troponin, High Sensitivity 24 (*)     All other components within normal limits   TROPONIN - Abnormal; Notable for the following components:    Troponin, High Sensitivity 20 (*)     All other components within normal limits   MICROSCOPIC URINALYSIS - Abnormal; Notable for the following components:    Bacteria, UA FEW (*)     Mucus, UA 1+ (*)     All other components within normal limits   RAPID INFLUENZA A/B ANTIGENS   CULTURE, URINE       All other labs were within normal range or not returned as of this dictation. RADIOLOGY:  CT ABDOMEN PELVIS WO CONTRAST Additional Contrast? None   Final Result   No CT evidence of acute intra-abdominal process. Diffuse colonic diverticulosis. Severe atherosclerosis. EKG:  Atrial fibrillation RVR, rate 111, left axis, right bundle branch block, otherwise normal intervals, no acute changes when compared to EKG from 10/30/2021         PROCEDURES:  Unless otherwise noted below, none      FINAL IMPRESSION      1.  Acute cystitis without hematuria          DISPOSITION/PLAN   DISPOSITION Decision To Discharge 12/08/2022 04:13:45 PM      PATIENT REFERRED TO:  Calhoun Hodgkin, MD  [place] [address]  [facility] [address]  [phone]    Call in 1 day      DISCHARGE MEDICATIONS:  Discharge Medication List as of 12/8/2022  4:15 PM        START taking these medications    Details   ciprofloxacin (CIPRO) 500 MG tablet Take 1 tablet by mouth 2 times daily for 10 days, Disp-20 tablet, R-0Print                (Please note that portions of this note were completed

## 2022-12-08 NOTE — ED PROVIDER NOTES
The patient was seen and evaluated by Dr. Hadley Guerra. See his documentation for complete details. Upon signout I was awaiting the patient's second troponin marker as well as her CT results. Second troponin remained stable at 20 and CT abdomen pelvis was read interpreted by radiology showing no CT evidence of acute intra-abdominal process diffuse colonic diverticulosis and severe atherosclerosis. Patient's urine did look infected. We will place her on Cipro for she is tolerated this in the past.  Is allergic to cephalosporins as well as penicillin products. She feels comfortable going home to follow-up with her family doctor as scheduled on the 13th. I informed her she needs to follow-up at least in 1 to 2 days for recheck with somebody in the office and to follow-up on the urine culture results. This point time the rest of her blood work did not reveal any acute process and she as well as her family member at the bedside feel very comfortable with going home.      Martha Cano, DO  12/08/22 7404

## 2022-12-08 NOTE — DISCHARGE INSTRUCTIONS
Return to the emergency department symptoms worsen or persist.  Follow-up with your family doctor in 1 to 2 days. You also need to follow-up with the urine culture results. Take the prescribed antibiotic as written in the emergency department.

## 2022-12-09 LAB
CULTURE: NORMAL
SPECIMEN DESCRIPTION: NORMAL

## 2022-12-10 LAB
EKG ATRIAL RATE: 156 BPM
EKG Q-T INTERVAL: 352 MS
EKG QRS DURATION: 116 MS
EKG QTC CALCULATION (BAZETT): 478 MS
EKG R AXIS: -72 DEGREES
EKG T AXIS: -19 DEGREES
EKG VENTRICULAR RATE: 111 BPM

## 2023-10-23 ENCOUNTER — HOSPITAL ENCOUNTER (EMERGENCY)
Facility: CLINIC | Age: 88
Discharge: HOME | End: 2023-10-23
Attending: EMERGENCY MEDICINE
Payer: COMMERCIAL

## 2023-10-23 ENCOUNTER — APPOINTMENT (OUTPATIENT)
Dept: CT IMAGING | Facility: CLINIC | Age: 88
End: 2023-10-23
Payer: COMMERCIAL

## 2023-10-23 ENCOUNTER — APPOINTMENT (OUTPATIENT)
Dept: GENERAL RADIOLOGY | Facility: CLINIC | Age: 88
End: 2023-10-23
Payer: COMMERCIAL

## 2023-10-23 VITALS
BODY MASS INDEX: 21.81 KG/M2 | SYSTOLIC BLOOD PRESSURE: 116 MMHG | TEMPERATURE: 97.1 F | WEIGHT: 108 LBS | OXYGEN SATURATION: 95 % | RESPIRATION RATE: 25 BRPM | HEART RATE: 85 BPM | DIASTOLIC BLOOD PRESSURE: 60 MMHG

## 2023-10-23 DIAGNOSIS — M79.672 LEFT FOOT PAIN: ICD-10-CM

## 2023-10-23 DIAGNOSIS — N39.0 URINARY TRACT INFECTION WITHOUT HEMATURIA, SITE UNSPECIFIED: Primary | ICD-10-CM

## 2023-10-23 LAB
ALBUMIN SERPL-MCNC: 4.7 G/DL (ref 3.5–5.2)
ALBUMIN/GLOB SERPL: 1.8 {RATIO} (ref 1–2.5)
ALP SERPL-CCNC: 89 U/L (ref 35–104)
ALT SERPL-CCNC: <5 U/L (ref 5–33)
ANION GAP SERPL CALCULATED.3IONS-SCNC: 11 MMOL/L (ref 9–17)
AST SERPL-CCNC: 16 U/L
BACTERIA URNS QL MICRO: ABNORMAL
BASOPHILS # BLD: 0 K/UL (ref 0–0.2)
BASOPHILS NFR BLD: 0 % (ref 0–2)
BILIRUB SERPL-MCNC: 0.2 MG/DL (ref 0.3–1.2)
BILIRUB UR QL STRIP: ABNORMAL
BUN SERPL-MCNC: 24 MG/DL (ref 8–23)
CALCIUM SERPL-MCNC: 9.7 MG/DL (ref 8.6–10.4)
CHARACTER UR: ABNORMAL
CHLORIDE SERPL-SCNC: 103 MMOL/L (ref 98–107)
CLARITY UR: CLEAR
CO2 SERPL-SCNC: 29 MMOL/L (ref 20–31)
COLOR UR: YELLOW
CREAT SERPL-MCNC: 1 MG/DL (ref 0.5–0.9)
CRYSTALS URNS MICRO: ABNORMAL /HPF
EOSINOPHIL # BLD: 0 K/UL (ref 0–0.4)
EOSINOPHILS RELATIVE PERCENT: 1 % (ref 1–4)
EPI CELLS #/AREA URNS HPF: ABNORMAL /HPF (ref 0–5)
ERYTHROCYTE [DISTWIDTH] IN BLOOD BY AUTOMATED COUNT: 13.5 % (ref 12.5–15.4)
GFR SERPL CREATININE-BSD FRML MDRD: 54 ML/MIN/1.73M2
GLUCOSE SERPL-MCNC: 102 MG/DL (ref 70–99)
GLUCOSE UR STRIP-MCNC: NEGATIVE MG/DL
HCT VFR BLD AUTO: 42 % (ref 36–46)
HGB BLD-MCNC: 14.1 G/DL (ref 12–16)
HGB UR QL STRIP.AUTO: NEGATIVE
KETONES UR STRIP-MCNC: ABNORMAL MG/DL
LEUKOCYTE ESTERASE UR QL STRIP: ABNORMAL
LIPASE SERPL-CCNC: 22 U/L (ref 13–60)
LYMPHOCYTES NFR BLD: 0.9 K/UL (ref 1–4.8)
LYMPHOCYTES RELATIVE PERCENT: 12 % (ref 24–44)
MCH RBC QN AUTO: 32.3 PG (ref 26–34)
MCHC RBC AUTO-ENTMCNC: 33.6 G/DL (ref 31–37)
MCV RBC AUTO: 95.9 FL (ref 80–100)
MONOCYTES NFR BLD: 0.6 K/UL (ref 0.1–1.2)
MONOCYTES NFR BLD: 8 % (ref 2–11)
NEUTROPHILS NFR BLD: 79 % (ref 36–66)
NEUTS SEG NFR BLD: 6.3 K/UL (ref 1.8–7.7)
NITRITE UR QL STRIP: NEGATIVE
PH UR STRIP: 5.5 [PH] (ref 5–8)
PLATELET # BLD AUTO: 272 K/UL (ref 140–450)
PMV BLD AUTO: 7.7 FL (ref 6–12)
POTASSIUM SERPL-SCNC: 4.7 MMOL/L (ref 3.7–5.3)
PROT SERPL-MCNC: 7.3 G/DL (ref 6.4–8.3)
PROT UR STRIP-MCNC: NEGATIVE MG/DL
RBC # BLD AUTO: 4.38 M/UL (ref 4–5.2)
RBC #/AREA URNS HPF: ABNORMAL /HPF (ref 0–2)
SODIUM SERPL-SCNC: 143 MMOL/L (ref 135–144)
SP GR UR STRIP: 1.03 (ref 1–1.03)
UROBILINOGEN UR STRIP-ACNC: NORMAL EU/DL (ref 0–1)
WBC #/AREA URNS HPF: ABNORMAL /HPF (ref 0–5)
WBC OTHER # BLD: 7.8 K/UL (ref 3.5–11)

## 2023-10-23 PROCEDURE — 36415 COLL VENOUS BLD VENIPUNCTURE: CPT

## 2023-10-23 PROCEDURE — 81001 URINALYSIS AUTO W/SCOPE: CPT

## 2023-10-23 PROCEDURE — 83690 ASSAY OF LIPASE: CPT

## 2023-10-23 PROCEDURE — 87328 CRYPTOSPORIDIUM AG IA: CPT

## 2023-10-23 PROCEDURE — 87324 CLOSTRIDIUM AG IA: CPT

## 2023-10-23 PROCEDURE — 87449 NOS EACH ORGANISM AG IA: CPT

## 2023-10-23 PROCEDURE — 99284 EMERGENCY DEPT VISIT MOD MDM: CPT

## 2023-10-23 PROCEDURE — 87186 SC STD MICRODIL/AGAR DIL: CPT

## 2023-10-23 PROCEDURE — 85025 COMPLETE CBC W/AUTO DIFF WBC: CPT

## 2023-10-23 PROCEDURE — 87088 URINE BACTERIA CULTURE: CPT

## 2023-10-23 PROCEDURE — 87086 URINE CULTURE/COLONY COUNT: CPT

## 2023-10-23 PROCEDURE — 73630 X-RAY EXAM OF FOOT: CPT

## 2023-10-23 PROCEDURE — 2580000003 HC RX 258: Performed by: REGISTERED NURSE

## 2023-10-23 PROCEDURE — 74176 CT ABD & PELVIS W/O CONTRAST: CPT

## 2023-10-23 PROCEDURE — 80053 COMPREHEN METABOLIC PANEL: CPT

## 2023-10-23 PROCEDURE — 87329 GIARDIA AG IA: CPT

## 2023-10-23 PROCEDURE — 87506 IADNA-DNA/RNA PROBE TQ 6-11: CPT

## 2023-10-23 RX ORDER — NITROFURANTOIN 25; 75 MG/1; MG/1
100 CAPSULE ORAL 2 TIMES DAILY
Qty: 10 CAPSULE | Refills: 0 | Status: SHIPPED | OUTPATIENT
Start: 2023-10-23 | End: 2023-10-28

## 2023-10-23 RX ORDER — 0.9 % SODIUM CHLORIDE 0.9 %
500 INTRAVENOUS SOLUTION INTRAVENOUS ONCE
Status: COMPLETED | OUTPATIENT
Start: 2023-10-23 | End: 2023-10-23

## 2023-10-23 RX ADMIN — SODIUM CHLORIDE 500 ML: 9 INJECTION, SOLUTION INTRAVENOUS at 11:32

## 2023-10-23 ASSESSMENT — ENCOUNTER SYMPTOMS
DIARRHEA: 1
COUGH: 0
ABDOMINAL PAIN: 1
SHORTNESS OF BREATH: 0
CONSTIPATION: 1
NAUSEA: 0
BACK PAIN: 0
VOMITING: 0

## 2023-10-23 ASSESSMENT — LIFESTYLE VARIABLES
HOW OFTEN DO YOU HAVE A DRINK CONTAINING ALCOHOL: NEVER
HOW MANY STANDARD DRINKS CONTAINING ALCOHOL DO YOU HAVE ON A TYPICAL DAY: PATIENT DOES NOT DRINK

## 2023-10-23 ASSESSMENT — PAIN SCALES - GENERAL: PAINLEVEL_OUTOF10: 3

## 2023-10-23 ASSESSMENT — PAIN DESCRIPTION - DESCRIPTORS: DESCRIPTORS: DISCOMFORT

## 2023-10-23 ASSESSMENT — PAIN - FUNCTIONAL ASSESSMENT: PAIN_FUNCTIONAL_ASSESSMENT: 0-10

## 2023-10-23 ASSESSMENT — PAIN DESCRIPTION - LOCATION: LOCATION: ABDOMEN

## 2023-10-23 ASSESSMENT — PAIN DESCRIPTION - PAIN TYPE: TYPE: ACUTE PAIN

## 2023-10-23 NOTE — ED PROVIDER NOTES
[facility]  [address]  Phone: [phone]        Pt Name: Roxanna Zelaya  MRN: [id number]  [address] [facility] 1935  Date of evaluation: 10/23/23    [address]       Chief Complaint   Patient presents with    Abdominal Pain     12X soft stools a day for about 1 week    Foot Pain     L foot       HISTORY OF PRESENT ILLNESS (Location/Symptom, Timing/Onset, Context/Setting, Quality, Duration, Modifying Factors, Severity)      Roxanna Zelaya is a 80 y.o. female who presents to the ED via private auto with intermittent diarrhea and constipation ongoing for the last few months. Patient states that over the last week she has had more frequent loose stools and has had lower abdominal pain. She denies any nausea, chest pain, shortness of breath, difficulty breathing, fevers, chills or dysuria. She also states that it feels \"hot\" when she does urinate. She states she is unable to tell if she has any hematuria or blood in her stool due to her vision difficulties. Physical a few days she has not taken medication for symptoms states nothing makes her symptoms better or worse. She also incidentally states that she has had left foot pain and feels that there is a \"stone\" in her left foot. States he only has pain when she walks. Denies any known injury or surgeries to the left foot. On arrival she is resting on the cot comfortably with even unlabored breaths and nontoxic-appearing no acute distress noted. PAST MEDICAL / SURGICAL / SOCIAL / FAMILY HISTORY     PMH:  has a past medical history of A-fib ([address]), Atrial fibrillation ([address]), CHF (congestive heart failure) ([address]), COPD (chronic obstructive pulmonary disease) ([address]), Diverticulosis, TIMOTEO (generalized anxiety disorder), H/O blood clots, History of cardioversion, Liver cyst, Macular degeneration, Pneumonia, Rectocele, and Stage 3b chronic kidney disease ([address]   Surgical History:  has a past surgical history that includes ablation of

## 2023-10-23 NOTE — ED PROVIDER NOTES
I was present in the ED during this patient's evaluation and management by the Advance Practice Provider and was available to address any concerns about their medical management.     Silva Flynn MD  Attending, Emergency Department       Cathy Pham MD  10/23/23 7589

## 2023-10-23 NOTE — DISCHARGE INSTRUCTIONS
PLEASE RETURN TO THE EMERGENCY DEPARTMENT IMMEDIATELY if your symptoms worsen in anyway or in 8-12 hours if not improved for re-evaluation. You should immediately return to the ER for symptoms such as increasing pain, bloody stool, fever, a feeling of passing out, light headed, dizziness, chest pain, shortness of breath, persistent nausea and/or vomiting, numbness or weakness to the arms or legs, coolness or color change of the arms or legs. Take your medication as indicated and prescribed. If you are given an antibiotic then, make sure you get the prescription filled and take the antibiotics until finished. Please understand that at this time there is no evidence for a more serious underlying process, but that early in the process of an illness or injury, an emergency department workup can be falsely reassuring. You should contact your family doctor within the next 24 hours for a follow up appointment    [address] [facility]!!!    From [facility] and [facility] Emergency Services    On behalf of the Emergency Department staff at [facility]), I would like to thank you for giving us the opportunity to address your health care needs and concerns. We hope that during your visit, our service was delivered in a professional and caring manner. Please keep [facility]) in mind as we walk with you down the path to your own personal wellness. Please expect an automated text message or email from us so we can ask a few questions about your health and progress. Based on your answers, a clinician may call you back to offer help and instructions. Please understand that early in the process of an illness or injury, an emergency department workup can be falsely reassuring. If you notice any worsening, changing or persistent symptoms please call your family doctor or return to the ER immediately. Tell us how we did during your visit at [email or web address]   and let us know about your experience

## 2023-10-24 LAB
C DIFF GDH + TOXINS A+B STL QL IA.RAPID: NEGATIVE
C PARVUM AG STL QL IA: NEGATIVE
CAMPYLOBACTER DNA SPEC NAA+PROBE: NORMAL
ETEC ELTA+ESTB GENES STL QL NAA+PROBE: NORMAL
G LAMBLIA AG STL QL IA: NEGATIVE
MICROORGANISM SPEC CULT: ABNORMAL
P SHIGELLOIDES DNA STL QL NAA+PROBE: NORMAL
SALMONELLA DNA SPEC QL NAA+PROBE: NORMAL
SHIGA TOXIN STX GENE SPEC NAA+PROBE: NORMAL
SHIGELLA DNA SPEC QL NAA+PROBE: NORMAL
SOURCE: NORMAL
SPECIMEN DESCRIPTION: ABNORMAL
SPECIMEN DESCRIPTION: NORMAL
V CHOL+PARA RFBL+TRKH+TNAA STL QL NAA+PR: NORMAL
Y ENTERO RECN STL QL NAA+PROBE: NORMAL

## 2024-03-10 ENCOUNTER — APPOINTMENT (OUTPATIENT)
Dept: CT IMAGING | Age: 89
End: 2024-03-10
Payer: COMMERCIAL

## 2024-03-10 ENCOUNTER — HOSPITAL ENCOUNTER (EMERGENCY)
Age: 89
Discharge: HOME | End: 2024-03-10
Attending: EMERGENCY MEDICINE
Payer: COMMERCIAL

## 2024-03-10 VITALS
HEIGHT: 59 IN | WEIGHT: 106 LBS | HEART RATE: 94 BPM | BODY MASS INDEX: 21.37 KG/M2 | TEMPERATURE: 97.6 F | DIASTOLIC BLOOD PRESSURE: 66 MMHG | SYSTOLIC BLOOD PRESSURE: 117 MMHG | RESPIRATION RATE: 20 BRPM | OXYGEN SATURATION: 93 %

## 2024-03-10 DIAGNOSIS — R93.5 ABNORMAL CT OF THE ABDOMEN: ICD-10-CM

## 2024-03-10 DIAGNOSIS — R10.9 ABDOMINAL PAIN, UNSPECIFIED ABDOMINAL LOCATION: Primary | ICD-10-CM

## 2024-03-10 DIAGNOSIS — K59.00 CONSTIPATION, UNSPECIFIED CONSTIPATION TYPE: ICD-10-CM

## 2024-03-10 LAB
ALBUMIN SERPL-MCNC: 3.9 G/DL (ref 3.5–5.2)
ALP SERPL-CCNC: 111 U/L (ref 35–104)
ALT SERPL-CCNC: <5 U/L (ref 5–33)
ANION GAP SERPL CALCULATED.3IONS-SCNC: 14 MMOL/L (ref 9–17)
AST SERPL-CCNC: 17 U/L
BASOPHILS # BLD: <0.03 K/UL (ref 0–0.2)
BASOPHILS NFR BLD: 0 % (ref 0–2)
BILIRUB DIRECT SERPL-MCNC: 0.2 MG/DL
BILIRUB INDIRECT SERPL-MCNC: 0.2 MG/DL (ref 0–1)
BILIRUB SERPL-MCNC: 0.4 MG/DL (ref 0.3–1.2)
BUN SERPL-MCNC: 15 MG/DL (ref 8–23)
BUN/CREAT SERPL: 14 (ref 9–20)
CALCIUM SERPL-MCNC: 8.8 MG/DL (ref 8.6–10.4)
CHLORIDE SERPL-SCNC: 100 MMOL/L (ref 98–107)
CO2 SERPL-SCNC: 27 MMOL/L (ref 20–31)
CREAT SERPL-MCNC: 1.1 MG/DL (ref 0.5–0.9)
EOSINOPHIL # BLD: 0.03 K/UL (ref 0–0.44)
EOSINOPHILS RELATIVE PERCENT: 1 % (ref 1–4)
ERYTHROCYTE [DISTWIDTH] IN BLOOD BY AUTOMATED COUNT: 13.2 % (ref 11.8–14.4)
GFR SERPL CREATININE-BSD FRML MDRD: 48 ML/MIN/1.73M2
GLUCOSE SERPL-MCNC: 68 MG/DL (ref 70–99)
HCT VFR BLD AUTO: 40.6 % (ref 36.3–47.1)
HGB BLD-MCNC: 13.7 G/DL (ref 11.9–15.1)
IMM GRANULOCYTES # BLD AUTO: 0.01 K/UL (ref 0–0.3)
IMM GRANULOCYTES NFR BLD: 0 %
LIPASE SERPL-CCNC: 12 U/L (ref 13–60)
LYMPHOCYTES NFR BLD: 0.99 K/UL (ref 1.1–3.7)
LYMPHOCYTES RELATIVE PERCENT: 17 % (ref 24–43)
MCH RBC QN AUTO: 32.8 PG (ref 25.2–33.5)
MCHC RBC AUTO-ENTMCNC: 33.7 G/DL (ref 28.4–34.8)
MCV RBC AUTO: 97.1 FL (ref 82.6–102.9)
MONOCYTES NFR BLD: 0.43 K/UL (ref 0.1–1.2)
MONOCYTES NFR BLD: 7 % (ref 3–12)
NEUTROPHILS NFR BLD: 75 % (ref 36–65)
NEUTS SEG NFR BLD: 4.45 K/UL (ref 1.5–8.1)
NRBC BLD-RTO: 0 PER 100 WBC
PLATELET # BLD AUTO: 320 K/UL (ref 138–453)
PMV BLD AUTO: 9.5 FL (ref 8.1–13.5)
POTASSIUM SERPL-SCNC: 4.6 MMOL/L (ref 3.7–5.3)
PROT SERPL-MCNC: 5.9 G/DL (ref 6.4–8.3)
RBC # BLD AUTO: 4.18 M/UL (ref 3.95–5.11)
SODIUM SERPL-SCNC: 141 MMOL/L (ref 135–144)
WBC OTHER # BLD: 5.9 K/UL (ref 3.5–11.3)

## 2024-03-10 PROCEDURE — 74176 CT ABD & PELVIS W/O CONTRAST: CPT

## 2024-03-10 PROCEDURE — 96374 THER/PROPH/DIAG INJ IV PUSH: CPT

## 2024-03-10 PROCEDURE — 6370000000 HC RX 637 (ALT 250 FOR IP): Performed by: NURSE PRACTITIONER

## 2024-03-10 PROCEDURE — 99284 EMERGENCY DEPT VISIT MOD MDM: CPT

## 2024-03-10 PROCEDURE — 96375 TX/PRO/DX INJ NEW DRUG ADDON: CPT

## 2024-03-10 PROCEDURE — 80076 HEPATIC FUNCTION PANEL: CPT

## 2024-03-10 PROCEDURE — 6360000002 HC RX W HCPCS: Performed by: NURSE PRACTITIONER

## 2024-03-10 PROCEDURE — 2580000003 HC RX 258: Performed by: NURSE PRACTITIONER

## 2024-03-10 PROCEDURE — 85025 COMPLETE CBC W/AUTO DIFF WBC: CPT

## 2024-03-10 PROCEDURE — 80048 BASIC METABOLIC PNL TOTAL CA: CPT

## 2024-03-10 PROCEDURE — 83690 ASSAY OF LIPASE: CPT

## 2024-03-10 RX ORDER — ONDANSETRON 2 MG/ML
4 INJECTION INTRAMUSCULAR; INTRAVENOUS ONCE
Status: COMPLETED | OUTPATIENT
Start: 2024-03-10 | End: 2024-03-10

## 2024-03-10 RX ORDER — POLYETHYLENE GLYCOL 3350 17 G/17G
17 POWDER, FOR SOLUTION ORAL DAILY
Qty: 255 G | Refills: 0 | Status: SHIPPED | OUTPATIENT
Start: 2024-03-10

## 2024-03-10 RX ORDER — SODIUM CHLORIDE 9 MG/ML
INJECTION, SOLUTION INTRAVENOUS CONTINUOUS
Status: DISCONTINUED | OUTPATIENT
Start: 2024-03-10 | End: 2024-03-10 | Stop reason: HOSPADM

## 2024-03-10 RX ORDER — FENTANYL CITRATE 0.05 MG/ML
25 INJECTION, SOLUTION INTRAMUSCULAR; INTRAVENOUS ONCE
Status: COMPLETED | OUTPATIENT
Start: 2024-03-10 | End: 2024-03-10

## 2024-03-10 RX ORDER — LACTULOSE 10 G/15ML
20 SOLUTION ORAL ONCE
Status: COMPLETED | OUTPATIENT
Start: 2024-03-10 | End: 2024-03-10

## 2024-03-10 RX ADMIN — FENTANYL CITRATE 25 MCG: 0.05 INJECTION, SOLUTION INTRAMUSCULAR; INTRAVENOUS at 16:45

## 2024-03-10 RX ADMIN — ONDANSETRON 4 MG: 2 INJECTION INTRAMUSCULAR; INTRAVENOUS at 16:45

## 2024-03-10 RX ADMIN — SODIUM CHLORIDE: 9 INJECTION, SOLUTION INTRAVENOUS at 15:36

## 2024-03-10 RX ADMIN — LACTULOSE 20 G: 20 SOLUTION ORAL at 18:25

## 2024-03-11 NOTE — ED PROVIDER NOTES
eMERGENCY dEPARTMENT eNCOUnter   Independent Attestation     Pt Name: Natasha Walls  MRN: [id number]  Birthdate 1935  Date of evaluation: 3/11/24     Natasha Walls is a 88 y.o. female with CC: Abdominal Pain (Pt states she has been having abd pain for 6 weeks. )      Based on the medical record the care appears appropriate.  I was personally available for consultation in the Emergency Department.    Nayla Pandya MD  Attending Emergency Physician                  Nayla Pandya MD  03/11/24 5725    
CNP  03/10/24 2139

## 2024-07-21 ENCOUNTER — HOSPITAL ENCOUNTER (EMERGENCY)
Facility: CLINIC | Age: 89
Discharge: HOME | End: 2024-07-22
Attending: EMERGENCY MEDICINE
Payer: COMMERCIAL

## 2024-07-21 ENCOUNTER — APPOINTMENT (OUTPATIENT)
Dept: CT IMAGING | Facility: CLINIC | Age: 89
End: 2024-07-21
Attending: EMERGENCY MEDICINE
Payer: COMMERCIAL

## 2024-07-21 VITALS
HEART RATE: 91 BPM | SYSTOLIC BLOOD PRESSURE: 116 MMHG | HEIGHT: 59 IN | DIASTOLIC BLOOD PRESSURE: 82 MMHG | OXYGEN SATURATION: 96 % | BODY MASS INDEX: 19.66 KG/M2 | TEMPERATURE: 98.2 F | WEIGHT: 97.5 LBS | RESPIRATION RATE: 15 BRPM

## 2024-07-21 DIAGNOSIS — K59.00 CONSTIPATION, UNSPECIFIED CONSTIPATION TYPE: ICD-10-CM

## 2024-07-21 DIAGNOSIS — R10.32 LEFT LOWER QUADRANT ABDOMINAL PAIN: Primary | ICD-10-CM

## 2024-07-21 LAB
ALBUMIN SERPL-MCNC: 3.8 G/DL (ref 3.5–5.2)
ALBUMIN/GLOB SERPL: 1.5 {RATIO} (ref 1–2.5)
ALP SERPL-CCNC: 98 U/L (ref 35–104)
ALT SERPL-CCNC: 9 U/L (ref 5–33)
ANION GAP SERPL CALCULATED.3IONS-SCNC: 10 MMOL/L (ref 9–17)
AST SERPL-CCNC: 20 U/L
BASOPHILS # BLD: 0.1 K/UL (ref 0–0.2)
BASOPHILS NFR BLD: 1 % (ref 0–2)
BILIRUB SERPL-MCNC: 0.6 MG/DL (ref 0.3–1.2)
BUN SERPL-MCNC: 29 MG/DL (ref 8–23)
CALCIUM SERPL-MCNC: 9.3 MG/DL (ref 8.6–10.4)
CHLORIDE SERPL-SCNC: 103 MMOL/L (ref 98–107)
CO2 SERPL-SCNC: 30 MMOL/L (ref 20–31)
CREAT SERPL-MCNC: 1.1 MG/DL (ref 0.5–0.9)
EOSINOPHIL # BLD: 0.1 K/UL (ref 0–0.4)
EOSINOPHILS RELATIVE PERCENT: 1 % (ref 1–4)
ERYTHROCYTE [DISTWIDTH] IN BLOOD BY AUTOMATED COUNT: 13.4 % (ref 12.5–15.4)
GFR, ESTIMATED: 48 ML/MIN/1.73M2
GLUCOSE SERPL-MCNC: 91 MG/DL (ref 70–99)
HCT VFR BLD AUTO: 40.3 % (ref 36–46)
HGB BLD-MCNC: 13.6 G/DL (ref 12–16)
LACTATE BLDV-SCNC: 1.3 MMOL/L (ref 0.5–2.2)
LIPASE SERPL-CCNC: 16 U/L (ref 13–60)
LYMPHOCYTES NFR BLD: 1.2 K/UL (ref 1–4.8)
LYMPHOCYTES RELATIVE PERCENT: 17 % (ref 24–44)
MCH RBC QN AUTO: 31.6 PG (ref 26–34)
MCHC RBC AUTO-ENTMCNC: 33.7 G/DL (ref 31–37)
MCV RBC AUTO: 94 FL (ref 80–100)
MONOCYTES NFR BLD: 0.7 K/UL (ref 0.1–1.2)
MONOCYTES NFR BLD: 9 % (ref 2–11)
NEUTROPHILS NFR BLD: 72 % (ref 36–66)
NEUTS SEG NFR BLD: 5.2 K/UL (ref 1.8–7.7)
PLATELET # BLD AUTO: 324 K/UL (ref 140–450)
PMV BLD AUTO: 8.2 FL (ref 6–12)
POTASSIUM SERPL-SCNC: 4.3 MMOL/L (ref 3.7–5.3)
PROT SERPL-MCNC: 6.4 G/DL (ref 6.4–8.3)
RBC # BLD AUTO: 4.29 M/UL (ref 4–5.2)
SODIUM SERPL-SCNC: 143 MMOL/L (ref 135–144)
WBC OTHER # BLD: 7.2 K/UL (ref 3.5–11)

## 2024-07-21 PROCEDURE — 80053 COMPREHEN METABOLIC PANEL: CPT

## 2024-07-21 PROCEDURE — 36415 COLL VENOUS BLD VENIPUNCTURE: CPT

## 2024-07-21 PROCEDURE — 83690 ASSAY OF LIPASE: CPT

## 2024-07-21 PROCEDURE — 85025 COMPLETE CBC W/AUTO DIFF WBC: CPT

## 2024-07-21 PROCEDURE — 99284 EMERGENCY DEPT VISIT MOD MDM: CPT

## 2024-07-21 PROCEDURE — 83605 ASSAY OF LACTIC ACID: CPT

## 2024-07-21 PROCEDURE — 74176 CT ABD & PELVIS W/O CONTRAST: CPT

## 2024-07-22 NOTE — DISCHARGE INSTRUCTIONS
.  Take prune juice, Dulcolax laxative.  Keep well-hydrated with liquids if you cannot eat solid food.  Call your family doctor tomorrow to arrange close follow-up and reevaluation.  Take Tylenol for pain if needed.  Return for problems.

## 2024-07-22 NOTE — ED NOTES
Pt has been very anxious, impatient, and verbally abusive and argumentative. During her stay in the ED. Pt frequently yelling at writer, angry because her care wasn't much quicker and she didn't receive her enema. Pt frequently called writer and attending physician \"lying bitches.\"  Son in law contacted to come and pick pt up to take her home. Pt angry that writer called family to pick her up stating \" I just know you were telling him lies about me.\"

## 2024-07-22 NOTE — ED NOTES
Pt presents to the ED via private auto. Pt states she has not had a normal BM for 8 days, c/o constipation. Pt states she frequently experiences constipation and the only thing that works is an enema. Pt states she normally goes to [facility] but they informed her they will no longer do enemas in the ED.

## 2024-07-22 NOTE — ED PROVIDER NOTES
[facility]  [address] [address]  Phone: [phone]        [facility] ED  EMERGENCY DEPARTMENT ENCOUNTER      Pt Name: Natasha Walls  MRN: [id number]  Birthdate 1935  Date of evaluation: 7/21/2024  Provider: Zamzam Verdugo MD    CHIEF COMPLAINT       Chief Complaint   Patient presents with    Abdominal Pain    Constipation     Last bm 8 days         HISTORY OF PRESENT ILLNESS   (Location/Symptom, Timing/Onset,Context/Setting, Quality, Duration, Modifying Factors, Severity)  Note limiting factors.   Natasha Walls is a 89 y.o. female who presents to the emergency department complaining of left-sided abdominal pain.  She states that she has not had a bowel movement for about 8 days.  She reports recent right inguinal surgery in June at [facility].  She has some nausea vomited once today after eating toast.  She has had no diarrhea recently.  She states she feels hot and cold but no documented fevers.  She denies any chest pain.  She says she feels short of breath at times.  She has no joint pain rashes or swelling of the extremities.     Patient is not diabetic does not smoke she lives by herself but currently has moved in with her daughter.  She has no hypertension MI but states that she had a pacemaker and A-fib and she is on a blood thinner    Nursing Notes were reviewed.    REVIEW OF SYSTEMS    (2-9systems for level 4, 10 or more for level 5)     Review of Systems   Constitutional: Negative.    HENT: Negative.     Respiratory: Negative.     Cardiovascular: Negative.    Gastrointestinal:  Positive for abdominal pain, constipation, nausea and vomiting. Negative for diarrhea.   Genitourinary: Negative.    Musculoskeletal: Negative.    Skin: Negative.    Neurological: Negative.    Psychiatric/Behavioral: Negative.         Except asnoted above the remainder of the review of systems was reviewed and negative.       PAST MEDICAL HISTORY     Past Medical History:

## 2024-08-20 ENCOUNTER — HOSPITAL ENCOUNTER (OUTPATIENT)
Age: 89
Setting detail: SPECIMEN
Discharge: HOME | End: 2024-08-20

## 2024-08-20 LAB
ALBUMIN SERPL-MCNC: 3.2 G/DL (ref 3.5–5.2)
ALP SERPL-CCNC: 79 U/L (ref 35–104)
ALT SERPL-CCNC: 5 U/L (ref 5–33)
ANION GAP SERPL CALCULATED.3IONS-SCNC: 12 MMOL/L (ref 9–17)
AST SERPL-CCNC: 17 U/L
BILIRUB SERPL-MCNC: 0.5 MG/DL (ref 0.3–1.2)
BNP SERPL-MCNC: ABNORMAL PG/ML
BUN SERPL-MCNC: 8 MG/DL (ref 8–23)
BUN/CREAT SERPL: 13 (ref 9–20)
CALCIUM SERPL-MCNC: 8.6 MG/DL (ref 8.6–10.4)
CHLORIDE SERPL-SCNC: 103 MMOL/L (ref 98–107)
CO2 SERPL-SCNC: 31 MMOL/L (ref 20–31)
CREAT SERPL-MCNC: 0.6 MG/DL (ref 0.5–0.9)
ERYTHROCYTE [DISTWIDTH] IN BLOOD BY AUTOMATED COUNT: 14.5 % (ref 11.8–14.4)
EST. AVERAGE GLUCOSE BLD GHB EST-MCNC: 108 MG/DL
GFR, ESTIMATED: 86 ML/MIN/1.73M2
GLUCOSE SERPL-MCNC: 66 MG/DL (ref 70–99)
HBA1C MFR BLD: 5.4 % (ref 4–6)
HCT VFR BLD AUTO: 37.1 % (ref 36.3–47.1)
HGB BLD-MCNC: 12 G/DL (ref 11.9–15.1)
MCH RBC QN AUTO: 32.4 PG (ref 25.2–33.5)
MCHC RBC AUTO-ENTMCNC: 32.3 G/DL (ref 28.4–34.8)
MCV RBC AUTO: 100.3 FL (ref 82.6–102.9)
NRBC BLD-RTO: 0 PER 100 WBC
PLATELET # BLD AUTO: 285 K/UL (ref 138–453)
PMV BLD AUTO: 10.1 FL (ref 8.1–13.5)
POTASSIUM SERPL-SCNC: 3.5 MMOL/L (ref 3.7–5.3)
PROT SERPL-MCNC: 5.8 G/DL (ref 6.4–8.3)
RBC # BLD AUTO: 3.7 M/UL (ref 3.95–5.11)
SODIUM SERPL-SCNC: 146 MMOL/L (ref 135–144)
WBC OTHER # BLD: 11.8 K/UL (ref 3.5–11.3)

## 2024-08-20 PROCEDURE — 80053 COMPREHEN METABOLIC PANEL: CPT

## 2024-08-20 PROCEDURE — 36415 COLL VENOUS BLD VENIPUNCTURE: CPT

## 2024-08-20 PROCEDURE — P9603 ONE-WAY ALLOW PRORATED MILES: HCPCS

## 2024-08-20 PROCEDURE — 83036 HEMOGLOBIN GLYCOSYLATED A1C: CPT

## 2024-08-20 PROCEDURE — 85027 COMPLETE CBC AUTOMATED: CPT

## 2024-08-20 PROCEDURE — 83880 ASSAY OF NATRIURETIC PEPTIDE: CPT

## 2024-08-21 ENCOUNTER — HOSPITAL ENCOUNTER (OUTPATIENT)
Age: 89
Setting detail: SPECIMEN
Discharge: HOME | End: 2024-08-21

## 2024-08-21 LAB
ALBUMIN SERPL-MCNC: 3.5 G/DL (ref 3.5–5.2)
ALP SERPL-CCNC: 79 U/L (ref 35–104)
ALT SERPL-CCNC: 7 U/L (ref 5–33)
ANION GAP SERPL CALCULATED.3IONS-SCNC: 14 MMOL/L (ref 9–17)
AST SERPL-CCNC: 18 U/L
BILIRUB SERPL-MCNC: 0.7 MG/DL (ref 0.3–1.2)
BNP SERPL-MCNC: ABNORMAL PG/ML
BUN SERPL-MCNC: 8 MG/DL (ref 8–23)
BUN/CREAT SERPL: 11 (ref 9–20)
CALCIUM SERPL-MCNC: 8.7 MG/DL (ref 8.6–10.4)
CHLORIDE SERPL-SCNC: 97 MMOL/L (ref 98–107)
CO2 SERPL-SCNC: 33 MMOL/L (ref 20–31)
CREAT SERPL-MCNC: 0.7 MG/DL (ref 0.5–0.9)
ERYTHROCYTE [DISTWIDTH] IN BLOOD BY AUTOMATED COUNT: 14.4 % (ref 11.8–14.4)
EST. AVERAGE GLUCOSE BLD GHB EST-MCNC: 108 MG/DL
GFR, ESTIMATED: 83 ML/MIN/1.73M2
GLUCOSE SERPL-MCNC: 91 MG/DL (ref 70–99)
HBA1C MFR BLD: 5.4 % (ref 4–6)
HCT VFR BLD AUTO: 36.6 % (ref 36.3–47.1)
HGB BLD-MCNC: 12 G/DL (ref 11.9–15.1)
MCH RBC QN AUTO: 32.2 PG (ref 25.2–33.5)
MCHC RBC AUTO-ENTMCNC: 32.8 G/DL (ref 28.4–34.8)
MCV RBC AUTO: 98.1 FL (ref 82.6–102.9)
NRBC BLD-RTO: 0 PER 100 WBC
PLATELET # BLD AUTO: 278 K/UL (ref 138–453)
PMV BLD AUTO: 10.5 FL (ref 8.1–13.5)
POTASSIUM SERPL-SCNC: 3.4 MMOL/L (ref 3.7–5.3)
PROT SERPL-MCNC: 6 G/DL (ref 6.4–8.3)
RBC # BLD AUTO: 3.73 M/UL (ref 3.95–5.11)
SODIUM SERPL-SCNC: 144 MMOL/L (ref 135–144)
WBC OTHER # BLD: 11.2 K/UL (ref 3.5–11.3)

## 2024-08-21 PROCEDURE — 83880 ASSAY OF NATRIURETIC PEPTIDE: CPT

## 2024-08-21 PROCEDURE — 80053 COMPREHEN METABOLIC PANEL: CPT

## 2024-08-21 PROCEDURE — 83036 HEMOGLOBIN GLYCOSYLATED A1C: CPT

## 2024-08-21 PROCEDURE — P9603 ONE-WAY ALLOW PRORATED MILES: HCPCS

## 2024-08-21 PROCEDURE — 36415 COLL VENOUS BLD VENIPUNCTURE: CPT

## 2024-08-21 PROCEDURE — 85027 COMPLETE CBC AUTOMATED: CPT

## 2024-08-28 ENCOUNTER — HOSPITAL ENCOUNTER (OUTPATIENT)
Age: 89
Setting detail: SPECIMEN
Discharge: HOME | End: 2024-08-28

## 2024-08-28 LAB
ANION GAP SERPL CALCULATED.3IONS-SCNC: 19 MMOL/L (ref 9–17)
BNP SERPL-MCNC: 1411 PG/ML
BUN SERPL-MCNC: 71 MG/DL (ref 8–23)
BUN/CREAT SERPL: 39 (ref 9–20)
CALCIUM SERPL-MCNC: 9.7 MG/DL (ref 8.6–10.4)
CHLORIDE SERPL-SCNC: 90 MMOL/L (ref 98–107)
CO2 SERPL-SCNC: 30 MMOL/L (ref 20–31)
CREAT SERPL-MCNC: 1.8 MG/DL (ref 0.5–0.9)
ERYTHROCYTE [DISTWIDTH] IN BLOOD BY AUTOMATED COUNT: 14.5 % (ref 11.8–14.4)
GFR, ESTIMATED: 27 ML/MIN/1.73M2
GLUCOSE SERPL-MCNC: 114 MG/DL (ref 70–99)
HCT VFR BLD AUTO: 45.2 % (ref 36.3–47.1)
HGB BLD-MCNC: 14.2 G/DL (ref 11.9–15.1)
MCH RBC QN AUTO: 32.2 PG (ref 25.2–33.5)
MCHC RBC AUTO-ENTMCNC: 31.4 G/DL (ref 28.4–34.8)
MCV RBC AUTO: 102.5 FL (ref 82.6–102.9)
NRBC BLD-RTO: 0 PER 100 WBC
PLATELET # BLD AUTO: 288 K/UL (ref 138–453)
PMV BLD AUTO: 11.7 FL (ref 8.1–13.5)
POTASSIUM SERPL-SCNC: 5.3 MMOL/L (ref 3.7–5.3)
RBC # BLD AUTO: 4.41 M/UL (ref 3.95–5.11)
SODIUM SERPL-SCNC: 139 MMOL/L (ref 135–144)
WBC OTHER # BLD: 10.9 K/UL (ref 3.5–11.3)

## 2024-08-28 PROCEDURE — 80048 BASIC METABOLIC PNL TOTAL CA: CPT

## 2024-08-28 PROCEDURE — 85027 COMPLETE CBC AUTOMATED: CPT

## 2024-08-28 PROCEDURE — P9603 ONE-WAY ALLOW PRORATED MILES: HCPCS

## 2024-08-28 PROCEDURE — 83880 ASSAY OF NATRIURETIC PEPTIDE: CPT

## 2024-08-28 PROCEDURE — 36415 COLL VENOUS BLD VENIPUNCTURE: CPT

## 2024-09-05 ENCOUNTER — HOSPITAL ENCOUNTER (OUTPATIENT)
Age: 89
Setting detail: SPECIMEN
Discharge: HOME | End: 2024-09-05

## 2024-09-06 LAB
ANION GAP SERPL CALCULATED.3IONS-SCNC: 10 MMOL/L (ref 9–16)
BNP SERPL-MCNC: 2311 PG/ML (ref 0–300)
BUN SERPL-MCNC: 14 MG/DL (ref 8–23)
CALCIUM SERPL-MCNC: 8.8 MG/DL (ref 8.6–10.4)
CHLORIDE SERPL-SCNC: 102 MMOL/L (ref 98–107)
CO2 SERPL-SCNC: 31 MMOL/L (ref 20–31)
CREAT SERPL-MCNC: 0.9 MG/DL (ref 0.5–0.9)
ERYTHROCYTE [DISTWIDTH] IN BLOOD BY AUTOMATED COUNT: 14.7 % (ref 11.8–14.4)
GFR, ESTIMATED: 58 ML/MIN/1.73M2
GLUCOSE SERPL-MCNC: 88 MG/DL (ref 74–99)
HCT VFR BLD AUTO: 39.3 % (ref 36.3–47.1)
HGB BLD-MCNC: 12.3 G/DL (ref 11.9–15.1)
MCH RBC QN AUTO: 31.5 PG (ref 25.2–33.5)
MCHC RBC AUTO-ENTMCNC: 31.3 G/DL (ref 28.4–34.8)
MCV RBC AUTO: 100.5 FL (ref 82.6–102.9)
NRBC BLD-RTO: 0 PER 100 WBC
PLATELET # BLD AUTO: 462 K/UL (ref 138–453)
PMV BLD AUTO: 10.5 FL (ref 8.1–13.5)
POTASSIUM SERPL-SCNC: 3.9 MMOL/L (ref 3.7–5.3)
RBC # BLD AUTO: 3.91 M/UL (ref 3.95–5.11)
SODIUM SERPL-SCNC: 143 MMOL/L (ref 136–145)
WBC OTHER # BLD: 9.4 K/UL (ref 3.5–11.3)

## 2024-09-06 PROCEDURE — 80048 BASIC METABOLIC PNL TOTAL CA: CPT

## 2024-09-06 PROCEDURE — 83880 ASSAY OF NATRIURETIC PEPTIDE: CPT

## 2024-09-06 PROCEDURE — P9603 ONE-WAY ALLOW PRORATED MILES: HCPCS

## 2024-09-06 PROCEDURE — 36415 COLL VENOUS BLD VENIPUNCTURE: CPT

## 2024-09-06 PROCEDURE — 85027 COMPLETE CBC AUTOMATED: CPT

## 2024-09-11 ENCOUNTER — HOSPITAL ENCOUNTER (OUTPATIENT)
Age: 89
Setting detail: SPECIMEN
Discharge: HOME | End: 2024-09-11

## 2024-09-11 LAB — DIGOXIN SERPL-MCNC: 1 NG/ML (ref 0.5–2)

## 2024-09-11 PROCEDURE — 80162 ASSAY OF DIGOXIN TOTAL: CPT

## 2024-09-11 PROCEDURE — P9603 ONE-WAY ALLOW PRORATED MILES: HCPCS

## 2024-09-11 PROCEDURE — 36415 COLL VENOUS BLD VENIPUNCTURE: CPT

## 2024-09-20 ENCOUNTER — HOSPITAL ENCOUNTER (OUTPATIENT)
Age: 89
Setting detail: SPECIMEN
Discharge: HOME | End: 2024-09-20

## 2024-09-23 ENCOUNTER — HOSPITAL ENCOUNTER (OUTPATIENT)
Age: 89
Setting detail: SPECIMEN
Discharge: HOME | End: 2024-09-23

## 2024-09-23 LAB
ANION GAP SERPL CALCULATED.3IONS-SCNC: 15 MMOL/L (ref 9–17)
BNP SERPL-MCNC: 1824 PG/ML
BUN SERPL-MCNC: 35 MG/DL (ref 8–23)
BUN/CREAT SERPL: 44 (ref 9–20)
CALCIUM SERPL-MCNC: 8.7 MG/DL (ref 8.6–10.4)
CHLORIDE SERPL-SCNC: 103 MMOL/L (ref 98–107)
CO2 SERPL-SCNC: 24 MMOL/L (ref 20–31)
CREAT SERPL-MCNC: 0.8 MG/DL (ref 0.5–0.9)
ERYTHROCYTE [DISTWIDTH] IN BLOOD BY AUTOMATED COUNT: 14.6 % (ref 11.8–14.4)
GFR, ESTIMATED: 70 ML/MIN/1.73M2
GLUCOSE SERPL-MCNC: 54 MG/DL (ref 70–99)
HCT VFR BLD AUTO: 38 % (ref 36.3–47.1)
HGB BLD-MCNC: 12.3 G/DL (ref 11.9–15.1)
MCH RBC QN AUTO: 32.6 PG (ref 25.2–33.5)
MCHC RBC AUTO-ENTMCNC: 32.4 G/DL (ref 28.4–34.8)
MCV RBC AUTO: 100.8 FL (ref 82.6–102.9)
NRBC BLD-RTO: 0 PER 100 WBC
PLATELET # BLD AUTO: 274 K/UL (ref 138–453)
PMV BLD AUTO: 11.1 FL (ref 8.1–13.5)
POTASSIUM SERPL-SCNC: 3.9 MMOL/L (ref 3.7–5.3)
RBC # BLD AUTO: 3.77 M/UL (ref 3.95–5.11)
SODIUM SERPL-SCNC: 142 MMOL/L (ref 135–144)
TROPONIN I SERPL HS-MCNC: 28 NG/L (ref 0–14)
WBC OTHER # BLD: 5.8 K/UL (ref 3.5–11.3)

## 2024-09-23 PROCEDURE — 84484 ASSAY OF TROPONIN QUANT: CPT

## 2024-09-23 PROCEDURE — 80048 BASIC METABOLIC PNL TOTAL CA: CPT

## 2024-09-23 PROCEDURE — 83880 ASSAY OF NATRIURETIC PEPTIDE: CPT

## 2024-09-23 PROCEDURE — 36415 COLL VENOUS BLD VENIPUNCTURE: CPT

## 2024-09-23 PROCEDURE — P9603 ONE-WAY ALLOW PRORATED MILES: HCPCS

## 2024-09-23 PROCEDURE — 85027 COMPLETE CBC AUTOMATED: CPT

## 2024-11-26 ENCOUNTER — APPOINTMENT (OUTPATIENT)
Dept: CT IMAGING | Facility: CLINIC | Age: 88
End: 2024-11-26
Payer: COMMERCIAL

## 2024-11-26 ENCOUNTER — HOSPITAL ENCOUNTER (INPATIENT)
Age: 88
LOS: 4 days | Discharge: HOME HEALTH | End: 2024-11-30
Attending: EMERGENCY MEDICINE | Admitting: FAMILY MEDICINE
Payer: COMMERCIAL

## 2024-11-26 ENCOUNTER — APPOINTMENT (OUTPATIENT)
Dept: GENERAL RADIOLOGY | Facility: CLINIC | Age: 88
End: 2024-11-26
Payer: COMMERCIAL

## 2024-11-26 DIAGNOSIS — R09.02 HYPOXIA: Primary | ICD-10-CM

## 2024-11-26 DIAGNOSIS — I48.91 ATRIAL FIBRILLATION WITH RVR (HCC): ICD-10-CM

## 2024-11-26 DIAGNOSIS — I50.813 ACUTE ON CHRONIC RIGHT-SIDED CONGESTIVE HEART FAILURE (HCC): ICD-10-CM

## 2024-11-26 DIAGNOSIS — N30.00 ACUTE CYSTITIS WITHOUT HEMATURIA: ICD-10-CM

## 2024-11-26 DIAGNOSIS — K59.00 CONSTIPATION, UNSPECIFIED CONSTIPATION TYPE: ICD-10-CM

## 2024-11-26 PROBLEM — I10 HTN (HYPERTENSION): Status: ACTIVE | Noted: 2024-08-08

## 2024-11-26 PROBLEM — J96.11 CHRONIC RESPIRATORY FAILURE WITH HYPOXIA: Status: ACTIVE | Noted: 2024-05-06

## 2024-11-26 LAB
ALBUMIN SERPL-MCNC: 3.6 G/DL (ref 3.5–5.2)
ALBUMIN/GLOB SERPL: 1.3 {RATIO} (ref 1–2.5)
ALP SERPL-CCNC: 94 U/L (ref 35–104)
ALT SERPL-CCNC: 6 U/L (ref 5–33)
ANION GAP SERPL CALCULATED.3IONS-SCNC: 13 MMOL/L (ref 9–17)
AST SERPL-CCNC: 16 U/L
BACTERIA URNS QL MICRO: ABNORMAL
BASOPHILS # BLD: 0 K/UL (ref 0–0.2)
BASOPHILS NFR BLD: 0 % (ref 0–2)
BILIRUB SERPL-MCNC: 0.4 MG/DL (ref 0.3–1.2)
BILIRUB UR QL STRIP: NEGATIVE
BNP SERPL-MCNC: 3615 PG/ML
BUN SERPL-MCNC: 22 MG/DL (ref 8–23)
CALCIUM SERPL-MCNC: 9.2 MG/DL (ref 8.6–10.4)
CHARACTER UR: ABNORMAL
CHLORIDE SERPL-SCNC: 102 MMOL/L (ref 98–107)
CLARITY UR: CLEAR
CO2 SERPL-SCNC: 26 MMOL/L (ref 20–31)
COLOR UR: YELLOW
CREAT SERPL-MCNC: 0.8 MG/DL (ref 0.5–0.9)
EOSINOPHIL # BLD: 0 K/UL (ref 0–0.4)
EOSINOPHILS RELATIVE PERCENT: 0 % (ref 1–4)
EPI CELLS #/AREA URNS HPF: ABNORMAL /HPF (ref 0–5)
ERYTHROCYTE [DISTWIDTH] IN BLOOD BY AUTOMATED COUNT: 14.5 % (ref 12.5–15.4)
GFR, ESTIMATED: 70 ML/MIN/1.73M2
GLUCOSE SERPL-MCNC: 91 MG/DL (ref 70–99)
GLUCOSE UR STRIP-MCNC: NEGATIVE MG/DL
HCT VFR BLD AUTO: 38.7 % (ref 36–46)
HGB BLD-MCNC: 13 G/DL (ref 12–16)
HGB UR QL STRIP.AUTO: NEGATIVE
KETONES UR STRIP-MCNC: NEGATIVE MG/DL
LACTATE BLDV-SCNC: 1.1 MMOL/L (ref 0.5–1.9)
LEUKOCYTE ESTERASE UR QL STRIP: ABNORMAL
LIPASE SERPL-CCNC: 13 U/L (ref 13–60)
LYMPHOCYTES NFR BLD: 0.8 K/UL (ref 1–4.8)
LYMPHOCYTES RELATIVE PERCENT: 11 % (ref 24–44)
MCH RBC QN AUTO: 33 PG (ref 26–34)
MCHC RBC AUTO-ENTMCNC: 33.5 G/DL (ref 31–37)
MCV RBC AUTO: 98.5 FL (ref 80–100)
MONOCYTES NFR BLD: 0.6 K/UL (ref 0.1–1.2)
MONOCYTES NFR BLD: 9 % (ref 2–11)
MUCOUS THREADS URNS QL MICRO: ABNORMAL
NEUTROPHILS NFR BLD: 80 % (ref 36–66)
NEUTS SEG NFR BLD: 5.4 K/UL (ref 1.8–7.7)
NITRITE UR QL STRIP: NEGATIVE
PH UR STRIP: 5.5 [PH] (ref 5–8)
PLATELET # BLD AUTO: 319 K/UL (ref 140–450)
PMV BLD AUTO: 7.7 FL (ref 6–12)
POTASSIUM SERPL-SCNC: 4.4 MMOL/L (ref 3.7–5.3)
PROT SERPL-MCNC: 6.4 G/DL (ref 6.4–8.3)
PROT UR STRIP-MCNC: NEGATIVE MG/DL
RBC # BLD AUTO: 3.93 M/UL (ref 4–5.2)
RBC #/AREA URNS HPF: ABNORMAL /HPF (ref 0–2)
SODIUM SERPL-SCNC: 141 MMOL/L (ref 135–144)
SP GR UR STRIP: 1.01 (ref 1–1.03)
TROPONIN I SERPL HS-MCNC: 17 NG/L (ref 0–14)
TROPONIN I SERPL HS-MCNC: 21 NG/L (ref 0–14)
UROBILINOGEN UR STRIP-ACNC: NORMAL EU/DL (ref 0–1)
WBC #/AREA URNS HPF: ABNORMAL /HPF (ref 0–5)
WBC OTHER # BLD: 6.8 K/UL (ref 3.5–11)
YEAST URNS QL MICRO: ABNORMAL

## 2024-11-26 PROCEDURE — 83605 ASSAY OF LACTIC ACID: CPT

## 2024-11-26 PROCEDURE — 6360000002 HC RX W HCPCS

## 2024-11-26 PROCEDURE — 6370000000 HC RX 637 (ALT 250 FOR IP)

## 2024-11-26 PROCEDURE — 6360000002 HC RX W HCPCS: Performed by: NURSE PRACTITIONER

## 2024-11-26 PROCEDURE — 83690 ASSAY OF LIPASE: CPT

## 2024-11-26 PROCEDURE — 99285 EMERGENCY DEPT VISIT HI MDM: CPT

## 2024-11-26 PROCEDURE — 2060000000 HC ICU INTERMEDIATE R&B

## 2024-11-26 PROCEDURE — 81001 URINALYSIS AUTO W/SCOPE: CPT

## 2024-11-26 PROCEDURE — 36415 COLL VENOUS BLD VENIPUNCTURE: CPT

## 2024-11-26 PROCEDURE — 6360000004 HC RX CONTRAST MEDICATION

## 2024-11-26 PROCEDURE — 71045 X-RAY EXAM CHEST 1 VIEW: CPT

## 2024-11-26 PROCEDURE — 83880 ASSAY OF NATRIURETIC PEPTIDE: CPT

## 2024-11-26 PROCEDURE — 6370000000 HC RX 637 (ALT 250 FOR IP): Performed by: NURSE PRACTITIONER

## 2024-11-26 PROCEDURE — 80053 COMPREHEN METABOLIC PANEL: CPT

## 2024-11-26 PROCEDURE — 84484 ASSAY OF TROPONIN QUANT: CPT

## 2024-11-26 PROCEDURE — 94760 N-INVAS EAR/PLS OXIMETRY 1: CPT

## 2024-11-26 PROCEDURE — 93005 ELECTROCARDIOGRAM TRACING: CPT

## 2024-11-26 PROCEDURE — 96375 TX/PRO/DX INJ NEW DRUG ADDON: CPT

## 2024-11-26 PROCEDURE — 2700000000 HC OXYGEN THERAPY PER DAY

## 2024-11-26 PROCEDURE — 85025 COMPLETE CBC W/AUTO DIFF WBC: CPT

## 2024-11-26 PROCEDURE — 99223 1ST HOSP IP/OBS HIGH 75: CPT

## 2024-11-26 PROCEDURE — 96365 THER/PROPH/DIAG IV INF INIT: CPT

## 2024-11-26 PROCEDURE — 87040 BLOOD CULTURE FOR BACTERIA: CPT

## 2024-11-26 PROCEDURE — 2580000003 HC RX 258

## 2024-11-26 PROCEDURE — 74177 CT ABD & PELVIS W/CONTRAST: CPT

## 2024-11-26 RX ORDER — ACETAMINOPHEN 650 MG/1
650 SUPPOSITORY RECTAL EVERY 6 HOURS PRN
Status: DISCONTINUED | OUTPATIENT
Start: 2024-11-26 | End: 2024-11-30 | Stop reason: HOSPADM

## 2024-11-26 RX ORDER — IOPAMIDOL 755 MG/ML
75 INJECTION, SOLUTION INTRAVASCULAR
Status: COMPLETED | OUTPATIENT
Start: 2024-11-26 | End: 2024-11-26

## 2024-11-26 RX ORDER — SPIRONOLACTONE 25 MG/1
25 TABLET ORAL DAILY
Status: DISCONTINUED | OUTPATIENT
Start: 2024-11-26 | End: 2024-11-27

## 2024-11-26 RX ORDER — ACETAMINOPHEN 325 MG/1
650 TABLET ORAL EVERY 6 HOURS PRN
Status: DISCONTINUED | OUTPATIENT
Start: 2024-11-26 | End: 2024-11-30 | Stop reason: HOSPADM

## 2024-11-26 RX ORDER — DIAZEPAM 5 MG/1
5 TABLET ORAL 3 TIMES DAILY PRN
Status: DISCONTINUED | OUTPATIENT
Start: 2024-11-26 | End: 2024-11-30 | Stop reason: HOSPADM

## 2024-11-26 RX ORDER — ZOLPIDEM TARTRATE 5 MG/1
5 TABLET ORAL NIGHTLY PRN
COMMUNITY

## 2024-11-26 RX ORDER — METOPROLOL SUCCINATE 25 MG/1
25 TABLET, EXTENDED RELEASE ORAL DAILY
Status: DISCONTINUED | OUTPATIENT
Start: 2024-11-26 | End: 2024-11-26

## 2024-11-26 RX ORDER — SODIUM CHLORIDE 0.9 % (FLUSH) 0.9 %
5-40 SYRINGE (ML) INJECTION EVERY 12 HOURS SCHEDULED
Status: DISCONTINUED | OUTPATIENT
Start: 2024-11-26 | End: 2024-11-30 | Stop reason: HOSPADM

## 2024-11-26 RX ORDER — LORAZEPAM 2 MG/ML
0.5 INJECTION INTRAMUSCULAR ONCE
Status: COMPLETED | OUTPATIENT
Start: 2024-11-26 | End: 2024-11-26

## 2024-11-26 RX ORDER — ONDANSETRON 2 MG/ML
4 INJECTION INTRAMUSCULAR; INTRAVENOUS EVERY 6 HOURS PRN
Status: DISCONTINUED | OUTPATIENT
Start: 2024-11-26 | End: 2024-11-30 | Stop reason: HOSPADM

## 2024-11-26 RX ORDER — SODIUM CHLORIDE 0.9 % (FLUSH) 0.9 %
10 SYRINGE (ML) INJECTION PRN
Status: DISCONTINUED | OUTPATIENT
Start: 2024-11-26 | End: 2024-11-30 | Stop reason: HOSPADM

## 2024-11-26 RX ORDER — ZOLPIDEM TARTRATE 5 MG/1
5 TABLET ORAL NIGHTLY PRN
Status: DISCONTINUED | OUTPATIENT
Start: 2024-11-26 | End: 2024-11-30 | Stop reason: HOSPADM

## 2024-11-26 RX ORDER — POTASSIUM CHLORIDE 7.45 MG/ML
10 INJECTION INTRAVENOUS PRN
Status: ACTIVE | OUTPATIENT
Start: 2024-11-26 | End: 2024-11-28

## 2024-11-26 RX ORDER — FUROSEMIDE 10 MG/ML
40 INJECTION INTRAMUSCULAR; INTRAVENOUS 2 TIMES DAILY
Status: DISCONTINUED | OUTPATIENT
Start: 2024-11-26 | End: 2024-11-27

## 2024-11-26 RX ORDER — DIPHENHYDRAMINE HYDROCHLORIDE 50 MG/ML
25 INJECTION INTRAMUSCULAR; INTRAVENOUS EVERY 6 HOURS PRN
Status: DISCONTINUED | OUTPATIENT
Start: 2024-11-26 | End: 2024-11-30 | Stop reason: HOSPADM

## 2024-11-26 RX ORDER — SODIUM CHLORIDE 9 MG/ML
INJECTION, SOLUTION INTRAVENOUS PRN
Status: DISCONTINUED | OUTPATIENT
Start: 2024-11-26 | End: 2024-11-30 | Stop reason: HOSPADM

## 2024-11-26 RX ORDER — ONDANSETRON 4 MG/1
4 TABLET, ORALLY DISINTEGRATING ORAL EVERY 8 HOURS PRN
Status: DISCONTINUED | OUTPATIENT
Start: 2024-11-26 | End: 2024-11-30 | Stop reason: HOSPADM

## 2024-11-26 RX ORDER — POLYETHYLENE GLYCOL 3350 17 G/17G
17 POWDER, FOR SOLUTION ORAL DAILY PRN
Status: DISCONTINUED | OUTPATIENT
Start: 2024-11-26 | End: 2024-11-29

## 2024-11-26 RX ORDER — LISINOPRIL 5 MG/1
5 TABLET ORAL DAILY
Status: DISCONTINUED | OUTPATIENT
Start: 2024-11-26 | End: 2024-11-27

## 2024-11-26 RX ORDER — LEVOFLOXACIN 5 MG/ML
500 INJECTION, SOLUTION INTRAVENOUS ONCE
Status: COMPLETED | OUTPATIENT
Start: 2024-11-26 | End: 2024-11-26

## 2024-11-26 RX ORDER — MAGNESIUM SULFATE 1 G/100ML
1000 INJECTION INTRAVENOUS PRN
Status: DISCONTINUED | OUTPATIENT
Start: 2024-11-26 | End: 2024-11-30 | Stop reason: HOSPADM

## 2024-11-26 RX ORDER — DILTIAZEM HYDROCHLORIDE 180 MG/1
180 CAPSULE, COATED, EXTENDED RELEASE ORAL DAILY
Status: DISCONTINUED | OUTPATIENT
Start: 2024-11-26 | End: 2024-11-26

## 2024-11-26 RX ORDER — DILTIAZEM HYDROCHLORIDE 30 MG/1
30 TABLET, FILM COATED ORAL ONCE
Status: COMPLETED | OUTPATIENT
Start: 2024-11-26 | End: 2024-11-26

## 2024-11-26 RX ORDER — FUROSEMIDE 10 MG/ML
40 INJECTION INTRAMUSCULAR; INTRAVENOUS ONCE
Status: COMPLETED | OUTPATIENT
Start: 2024-11-26 | End: 2024-11-26

## 2024-11-26 RX ORDER — ALBUTEROL SULFATE 0.83 MG/ML
2.5 SOLUTION RESPIRATORY (INHALATION) ONCE
Status: DISCONTINUED | OUTPATIENT
Start: 2024-11-26 | End: 2024-11-26

## 2024-11-26 RX ORDER — IPRATROPIUM BROMIDE AND ALBUTEROL SULFATE 2.5; .5 MG/3ML; MG/3ML
1 SOLUTION RESPIRATORY (INHALATION) ONCE
Status: COMPLETED | OUTPATIENT
Start: 2024-11-26 | End: 2024-11-26

## 2024-11-26 RX ORDER — DILTIAZEM HYDROCHLORIDE 240 MG/1
240 CAPSULE, COATED, EXTENDED RELEASE ORAL DAILY
Status: DISCONTINUED | OUTPATIENT
Start: 2024-11-27 | End: 2024-11-28

## 2024-11-26 RX ORDER — 0.9 % SODIUM CHLORIDE 0.9 %
70 INTRAVENOUS SOLUTION INTRAVENOUS ONCE
Status: DISCONTINUED | OUTPATIENT
Start: 2024-11-26 | End: 2024-11-28

## 2024-11-26 RX ORDER — POTASSIUM CHLORIDE 1500 MG/1
40 TABLET, EXTENDED RELEASE ORAL PRN
Status: ACTIVE | OUTPATIENT
Start: 2024-11-26 | End: 2024-11-28

## 2024-11-26 RX ADMIN — FUROSEMIDE 40 MG: 10 INJECTION, SOLUTION INTRAMUSCULAR; INTRAVENOUS at 19:13

## 2024-11-26 RX ADMIN — SODIUM CHLORIDE, PRESERVATIVE FREE 10 ML: 5 INJECTION INTRAVENOUS at 13:20

## 2024-11-26 RX ADMIN — ZOLPIDEM TARTRATE 5 MG: 5 TABLET, COATED ORAL at 22:18

## 2024-11-26 RX ADMIN — APIXABAN 2.5 MG: 2.5 TABLET, FILM COATED ORAL at 20:47

## 2024-11-26 RX ADMIN — SPIRONOLACTONE 25 MG: 25 TABLET ORAL at 19:16

## 2024-11-26 RX ADMIN — IPRATROPIUM BROMIDE AND ALBUTEROL SULFATE 1 DOSE: .5; 2.5 SOLUTION RESPIRATORY (INHALATION) at 12:40

## 2024-11-26 RX ADMIN — DIAZEPAM 5 MG: 5 TABLET ORAL at 22:18

## 2024-11-26 RX ADMIN — DILTIAZEM HYDROCHLORIDE 30 MG: 30 TABLET, FILM COATED ORAL at 13:11

## 2024-11-26 RX ADMIN — FUROSEMIDE 40 MG: 10 INJECTION, SOLUTION INTRAMUSCULAR; INTRAVENOUS at 15:06

## 2024-11-26 RX ADMIN — IOPAMIDOL 75 ML: 755 INJECTION, SOLUTION INTRAVENOUS at 13:19

## 2024-11-26 RX ADMIN — Medication 70 ML: at 13:20

## 2024-11-26 RX ADMIN — LORAZEPAM 0.5 MG: 2 INJECTION INTRAMUSCULAR; INTRAVENOUS at 20:58

## 2024-11-26 RX ADMIN — LEVOFLOXACIN 500 MG: 5 INJECTION, SOLUTION INTRAVENOUS at 15:12

## 2024-11-26 ASSESSMENT — ENCOUNTER SYMPTOMS
DIARRHEA: 0
CONSTIPATION: 1
SHORTNESS OF BREATH: 1
ABDOMINAL PAIN: 1
SORE THROAT: 1
COUGH: 1
VOMITING: 0
RHINORRHEA: 1

## 2024-11-26 ASSESSMENT — PAIN - FUNCTIONAL ASSESSMENT: PAIN_FUNCTIONAL_ASSESSMENT: NONE - DENIES PAIN

## 2024-11-26 NOTE — ED PROVIDER NOTES
[facility] ED      Pt Name: Natasha Walls  MRN: [id number]  Birthdate 1935  Date of evaluation: 11/26/2024    EMERGENCY DEPARTMENT ENCOUNTER      PERTINENT ATTENDING PHYSICIAN COMMENTS:      Faculty Attestation    I performed a history and physical examination of the patient and discussed management with the mid level provideer. I reviewed the mid level provider's note and agree with the documented findings and plan of care.Any areas of disagreement are noted on the chart. I was personally present for the key portions of any procedures. I have documented in the chart those procedures where I was not present during the key portions. I have reviewed the emergency nurses triage note. I agree with the chief complaint, past medical history, past surgical history, allergies, medications, social and family history as documented unless otherwise noted below. Documentation of the HPI, Physical Exam and Medical Decision Making performed by medical students or scribes is based on my personal performance of the HPI, PE and MDM. For Residents/Physician Assistant/ Nurse Practitioner cases/documentation I have personally evaluated this patient and have completed at least one if not all key elements of the E/M (history, physical exam, and MDM). Additional findings are as noted.    CHIEF COMPLAINT       Chief Complaint   Patient presents with    Cough       HISTORY OF PRESENT ILLNESS    Natasha Walls is a 89 y.o. female who presents to the emergency department complaining of a cough which is productive.  Symptoms have been ongoing for 3 days.  Family states the patient probably caught it from him because he has been sick with it to.  She does use inhalers at home but has not used it yesterday or today.  Patient was discharged November 7 from [facility] with congestive heart failure.  She followed up with cardiology yesterday and was started on Eliquis and Lasix.  Patient denies any fever, chills.  She vomited 1  include information that was incorporated after care transfer to another provider that were not available at the time of my evaluation.  Some of this information could likely include laboratory values, vital sign updates, medications etc.)    Xin Plaza DO   Attending Emergency Physician        Xin Plaza DO  11/26/24 2915

## 2024-11-26 NOTE — ED PROVIDER NOTES
[facility]  [address] [address]  Phone: [phone]    EMERGENCY DEPARTMENT ENCOUNTER          Pt Name: Natasha Walls  MRN: [id number]  Birthdate 1935  Date of evaluation: 11/26/2024      CHIEF COMPLAINT       Chief Complaint   Patient presents with    Cough       HISTORY OF PRESENT ILLNESS       Natasha Walls is a 89 y.o. female who presents 2 to 3-day history of worsening cough, rhinorrhea, and abdominal pain.  Her other symptoms include posttussive emesis, dysuria, and constipation.  Patient is afebrile and states that she has not had a fever, diarrhea, chest pain, ear pain, sore throat, or trouble swallowing.  Patient also has been constipated for the last 4 days and has had no bowel movements, but has not been taking her MiraLAX.  Of note patient has a history of A-fib with pacemaker and was out of Eliquis for a few days.  Patient has sick contacts of  who is also sick with similar symptoms.    REVIEW OF SYSTEMS       Review of Systems   Constitutional:  Positive for fever.   HENT:  Positive for congestion, rhinorrhea and sore throat.    Respiratory:  Positive for cough and shortness of breath.    Cardiovascular:  Negative for chest pain.   Gastrointestinal:  Positive for abdominal pain and constipation. Negative for diarrhea and vomiting.   Genitourinary:  Positive for dysuria.        PAST MEDICAL HISTORY    has a past medical history of A-fib (HCC), Atrial fibrillation (HCC), Benign essential hypertension, CHF (congestive heart failure) (HCC), COPD (chronic obstructive pulmonary disease) (HCC), Diverticulosis, TIMOTEO (generalized anxiety disorder), H/O blood clots, History of cardioversion, Liver cyst, Macular degeneration, Pneumonia, Rectocele, and Stage 3b chronic kidney disease (HCC).    SURGICAL HISTORY      has a past surgical history that includes ablation of dysrhythmic focus; Pacemaker insertion; Femur Surgery (Right); Appendectomy; Hysterectomy; and  Constipation, unspecified constipation type        89-year-old female with past medical history of A-fib with pacemaker coming in with abdominal pain, rhinorrhea, and worsening cough for the last 2 to 3 days.  Patient had not taken her Eliquis for a few days and is significantly tender in her abdomen.  This prompted a CT abdomen pelvis that showed diverticulosis without evidence of acute diverticulitis.  On physical exam lung sounds had rales throughout show patient was given a DuoNeb and imaging/labs were ordered.  Patient has a history of afib and while in the ED patient had rates in the 130's. She took her 180mg cardizem this morning, and we gave an additional dose of 30mg w/ improvement of rates to low 100s. Chest x-ray ordered showed bibasilar opacities with pulmonary edema seen on the CT abdomen/pelvis.  UA also showed evidence of UTI in combination with endorsement of dysuria with positive leukocyte esterase.  Patient was given Levaquin.  BNP was also elevated at 3600 so we ordered 40 mg of IV Lasix.  Originally patient was saturating 97% on room air, but started to become hypoxic in the 80s.  We ordered another inhalant with albuterol initiated admission to [facility] and patient was accepted by the hospitalist.  Patient to be transferred by ALS via ambulance.  DISPOSITION/PLAN   DISPOSITION Decision To Admit 11/26/2024 03:36:37 PM           Condition on Disposition    Improved    PATIENT REFERRED TO:  No follow-up provider specified.    DISCHARGE MEDICATIONS:  New Prescriptions    No medications on file       (Please note that portions of this note were completed with a voice recognition program.  Efforts were made to edit the dictations but occasionally words are mis-transcribed.)    DO Erin Rebolledo PGY2

## 2024-11-26 NOTE — PROGRESS NOTES
Spiritual Health History and Assessment/Progress Note  [facility]    (P) Initial Encounter,  ,  ,      Name: Natasha Walls MRN: [id number]    Age: 89 y.o.     Sex: female   Language: English   Ep[employer]opalian: Latter-day   Acute on chronic diastolic congestive heart failure (HCC)     Date: 11/26/2024            Total Time Calculated: (P) 3 min              Spiritual Assessment began in [facility]        Referral/Consult From: (P) Rounding   Encounter Overview/Reason: (P) Initial Encounter  Service Provided For: (P) Patient    Visit attempted. Pt requested assistance with ADLs.  will attempt another visit.    Electronically signed by Chaplain Vicenta on 11/26/2024 at 6:54 PM

## 2024-11-26 NOTE — PROGRESS NOTES
[] Medication Reconciliation was completed and the patient's home medication list was verified. The Med List Status is \"Complete\". The following sources were used to assist with Medication Reconciliation:    [] Med Rec Pharmacist already completed   [] Patient had a list of medications which was transcribed into the EHR.  [] Patient provided bottles of their medications  [] Home medications reviewed and confirmed with   [] Contacted patient's pharmacy to confirm home medications  [] Contacted patient's physician office to confirm home medications  [] Medical Records from another facility and/or Care Everywhere were reviewed  [] MAR from facility requested to be faxed over  [] Unable to complete due to patient condition  [] Unable to validate home medications      [x] There are one or more home medications that need clarification before Medication Reconciliation can be completed. The Med List Status has been marked as In Progress.     To assist with Home Medication Reconciliation the following actions have been taken:    [] Pharmacy medication reconciliation service requested. (Note: This can be done by sending a Perfect Serve message to The Med Rec Pharmacist or by phoning [phone].)  [x] Family requested to bring medications into the hospital  [] Family requested to call hospital with medication list  [] Message left with physician office  [] Request for medical records made to   [] Other

## 2024-11-26 NOTE — PROGRESS NOTES
Patient admitted to room 1010  VS taken, telemetry placed and call light given/within reach. Patient A&O and given room orientation. Orders released/reviewed, initial assessment completed and navigator started. See chart for more detail.

## 2024-11-27 PROBLEM — I50.33 ACUTE ON CHRONIC DIASTOLIC HEART FAILURE (HCC): Status: ACTIVE | Noted: 2024-11-27

## 2024-11-27 PROBLEM — H54.8 LEGALLY BLIND: Status: ACTIVE | Noted: 2024-11-27

## 2024-11-27 PROBLEM — E43 SEVERE MALNUTRITION (HCC): Status: ACTIVE | Noted: 2024-11-27

## 2024-11-27 LAB
ANION GAP SERPL CALCULATED.3IONS-SCNC: 14 MMOL/L (ref 9–16)
BASOPHILS # BLD: <0.03 K/UL (ref 0–0.2)
BASOPHILS NFR BLD: 0 % (ref 0–2)
BUN SERPL-MCNC: 21 MG/DL (ref 8–23)
CALCIUM SERPL-MCNC: 9 MG/DL (ref 8.8–10.2)
CHLORIDE SERPL-SCNC: 97 MMOL/L (ref 98–107)
CO2 SERPL-SCNC: 29 MMOL/L (ref 20–31)
CREAT SERPL-MCNC: 1.1 MG/DL (ref 0.5–0.9)
EOSINOPHIL # BLD: 0.03 K/UL (ref 0–0.44)
EOSINOPHILS RELATIVE PERCENT: 0 % (ref 1–4)
ERYTHROCYTE [DISTWIDTH] IN BLOOD BY AUTOMATED COUNT: 13.4 % (ref 11.8–14.4)
GFR, ESTIMATED: 48 ML/MIN/1.73M2
GLUCOSE SERPL-MCNC: 79 MG/DL (ref 82–115)
HCT VFR BLD AUTO: 35.9 % (ref 36.3–47.1)
HGB BLD-MCNC: 12 G/DL (ref 11.9–15.1)
IMM GRANULOCYTES # BLD AUTO: 0.02 K/UL (ref 0–0.3)
IMM GRANULOCYTES NFR BLD: 0 %
LYMPHOCYTES NFR BLD: 0.91 K/UL (ref 1.1–3.7)
LYMPHOCYTES RELATIVE PERCENT: 13 % (ref 24–43)
MCH RBC QN AUTO: 33.6 PG (ref 25.2–33.5)
MCHC RBC AUTO-ENTMCNC: 33.4 G/DL (ref 28.4–34.8)
MCV RBC AUTO: 100.6 FL (ref 82.6–102.9)
MONOCYTES NFR BLD: 0.82 K/UL (ref 0.1–1.2)
MONOCYTES NFR BLD: 12 % (ref 3–12)
NEUTROPHILS NFR BLD: 75 % (ref 36–65)
NEUTS SEG NFR BLD: 5.29 K/UL (ref 1.5–8.1)
NRBC BLD-RTO: 0 PER 100 WBC
PLATELET # BLD AUTO: 307 K/UL (ref 138–453)
PMV BLD AUTO: 9.9 FL (ref 8.1–13.5)
POTASSIUM SERPL-SCNC: 3.6 MMOL/L (ref 3.7–5.3)
RBC # BLD AUTO: 3.57 M/UL (ref 3.95–5.11)
SODIUM SERPL-SCNC: 140 MMOL/L (ref 136–145)
WBC OTHER # BLD: 7.1 K/UL (ref 3.5–11.3)

## 2024-11-27 PROCEDURE — 97116 GAIT TRAINING THERAPY: CPT

## 2024-11-27 PROCEDURE — 1200000000 HC SEMI PRIVATE

## 2024-11-27 PROCEDURE — 6370000000 HC RX 637 (ALT 250 FOR IP): Performed by: NURSE PRACTITIONER

## 2024-11-27 PROCEDURE — 99232 SBSQ HOSP IP/OBS MODERATE 35: CPT | Performed by: FAMILY MEDICINE

## 2024-11-27 PROCEDURE — 80048 BASIC METABOLIC PNL TOTAL CA: CPT

## 2024-11-27 PROCEDURE — 6360000002 HC RX W HCPCS: Performed by: NURSE PRACTITIONER

## 2024-11-27 PROCEDURE — 2580000003 HC RX 258: Performed by: NURSE PRACTITIONER

## 2024-11-27 PROCEDURE — 97167 OT EVAL HIGH COMPLEX 60 MIN: CPT

## 2024-11-27 PROCEDURE — 97530 THERAPEUTIC ACTIVITIES: CPT

## 2024-11-27 PROCEDURE — 6370000000 HC RX 637 (ALT 250 FOR IP): Performed by: FAMILY MEDICINE

## 2024-11-27 PROCEDURE — 85025 COMPLETE CBC W/AUTO DIFF WBC: CPT

## 2024-11-27 PROCEDURE — 97110 THERAPEUTIC EXERCISES: CPT

## 2024-11-27 PROCEDURE — 6360000002 HC RX W HCPCS

## 2024-11-27 PROCEDURE — 97162 PT EVAL MOD COMPLEX 30 MIN: CPT

## 2024-11-27 PROCEDURE — 36415 COLL VENOUS BLD VENIPUNCTURE: CPT

## 2024-11-27 PROCEDURE — 97535 SELF CARE MNGMENT TRAINING: CPT

## 2024-11-27 PROCEDURE — 6360000002 HC RX W HCPCS: Performed by: FAMILY MEDICINE

## 2024-11-27 RX ORDER — FUROSEMIDE 10 MG/ML
20 INJECTION INTRAMUSCULAR; INTRAVENOUS 2 TIMES DAILY
Status: DISCONTINUED | OUTPATIENT
Start: 2024-11-27 | End: 2024-11-28

## 2024-11-27 RX ORDER — LEVOFLOXACIN 5 MG/ML
500 INJECTION, SOLUTION INTRAVENOUS ONCE
Status: DISCONTINUED | OUTPATIENT
Start: 2024-11-27 | End: 2024-11-27 | Stop reason: DRUGHIGH

## 2024-11-27 RX ORDER — ALBUTEROL SULFATE 90 UG/1
2 INHALANT RESPIRATORY (INHALATION) EVERY 4 HOURS PRN
COMMUNITY
Start: 2024-10-23

## 2024-11-27 RX ORDER — BUDESONIDE AND FORMOTEROL FUMARATE DIHYDRATE 160; 4.5 UG/1; UG/1
2 AEROSOL RESPIRATORY (INHALATION)
Status: DISCONTINUED | OUTPATIENT
Start: 2024-11-27 | End: 2024-11-30 | Stop reason: HOSPADM

## 2024-11-27 RX ORDER — METOPROLOL SUCCINATE 25 MG/1
25 TABLET, EXTENDED RELEASE ORAL DAILY
Status: DISCONTINUED | OUTPATIENT
Start: 2024-11-27 | End: 2024-11-30 | Stop reason: HOSPADM

## 2024-11-27 RX ORDER — DIGOXIN 125 MCG
64 TABLET ORAL DAILY
Status: DISCONTINUED | OUTPATIENT
Start: 2024-11-28 | End: 2024-11-30 | Stop reason: HOSPADM

## 2024-11-27 RX ORDER — DIGOXIN 0.25 MG/ML
250 INJECTION INTRAMUSCULAR; INTRAVENOUS ONCE
Status: COMPLETED | OUTPATIENT
Start: 2024-11-27 | End: 2024-11-27

## 2024-11-27 RX ORDER — LEVOFLOXACIN 5 MG/ML
250 INJECTION, SOLUTION INTRAVENOUS EVERY 24 HOURS
Status: DISCONTINUED | OUTPATIENT
Start: 2024-11-27 | End: 2024-11-28

## 2024-11-27 RX ORDER — LISINOPRIL 2.5 MG/1
2.5 TABLET ORAL DAILY
Status: DISCONTINUED | OUTPATIENT
Start: 2024-11-28 | End: 2024-11-28

## 2024-11-27 RX ORDER — DILTIAZEM HYDROCHLORIDE 240 MG/1
240 CAPSULE, COATED, EXTENDED RELEASE ORAL DAILY
Status: ON HOLD | COMMUNITY
Start: 2023-10-31 | End: 2024-11-30 | Stop reason: HOSPADM

## 2024-11-27 RX ORDER — DIGOXIN 125 MCG
125 TABLET ORAL DAILY
Status: DISCONTINUED | OUTPATIENT
Start: 2024-11-28 | End: 2024-11-27

## 2024-11-27 RX ADMIN — SPIRONOLACTONE 25 MG: 25 TABLET ORAL at 08:08

## 2024-11-27 RX ADMIN — LEVOFLOXACIN 250 MG: 5 INJECTION, SOLUTION INTRAVENOUS at 16:19

## 2024-11-27 RX ADMIN — SODIUM CHLORIDE, PRESERVATIVE FREE 10 ML: 5 INJECTION INTRAVENOUS at 20:25

## 2024-11-27 RX ADMIN — SODIUM CHLORIDE, PRESERVATIVE FREE 10 ML: 5 INJECTION INTRAVENOUS at 08:09

## 2024-11-27 RX ADMIN — DIAZEPAM 5 MG: 5 TABLET ORAL at 23:25

## 2024-11-27 RX ADMIN — DIPHENHYDRAMINE HYDROCHLORIDE 25 MG: 50 INJECTION INTRAMUSCULAR; INTRAVENOUS at 01:27

## 2024-11-27 RX ADMIN — DIGOXIN 250 MCG: 0.25 INJECTION INTRAMUSCULAR; INTRAVENOUS at 09:59

## 2024-11-27 RX ADMIN — FUROSEMIDE 40 MG: 10 INJECTION, SOLUTION INTRAMUSCULAR; INTRAVENOUS at 08:09

## 2024-11-27 RX ADMIN — FUROSEMIDE 20 MG: 10 INJECTION, SOLUTION INTRAMUSCULAR; INTRAVENOUS at 19:11

## 2024-11-27 RX ADMIN — DILTIAZEM HYDROCHLORIDE 240 MG: 240 CAPSULE, COATED, EXTENDED RELEASE ORAL at 08:08

## 2024-11-27 RX ADMIN — ZOLPIDEM TARTRATE 5 MG: 5 TABLET, COATED ORAL at 23:25

## 2024-11-27 RX ADMIN — APIXABAN 2.5 MG: 2.5 TABLET, FILM COATED ORAL at 08:08

## 2024-11-27 RX ADMIN — SODIUM CHLORIDE, PRESERVATIVE FREE 10 ML: 5 INJECTION INTRAVENOUS at 01:30

## 2024-11-27 RX ADMIN — METOPROLOL SUCCINATE 25 MG: 25 TABLET, EXTENDED RELEASE ORAL at 13:54

## 2024-11-27 RX ADMIN — APIXABAN 2.5 MG: 2.5 TABLET, FILM COATED ORAL at 20:24

## 2024-11-27 RX ADMIN — POLYETHYLENE GLYCOL 3350 17 G: 17 POWDER, FOR SOLUTION ORAL at 16:08

## 2024-11-27 ASSESSMENT — ENCOUNTER SYMPTOMS
BLOOD IN STOOL: 0
SHORTNESS OF BREATH: 1
ABDOMINAL PAIN: 0
VOMITING: 0
RHINORRHEA: 0
DIARRHEA: 0
WHEEZING: 0
COUGH: 0
NAUSEA: 0
CHEST TIGHTNESS: 0
CONSTIPATION: 0

## 2024-11-27 NOTE — CARE COORDINATION
Case Management Assessment  Initial Evaluation    Date/Time of Evaluation: 11/27/2024 11:44 AM  Assessment Completed by: Tashia Mai RN    If patient is discharged prior to next notation, then this note serves as note for discharge by case management.    Patient Name: Natasha Walls                   YOB: 1935  Diagnosis: Hypoxia [R09.02]  Atrial fibrillation with RVR (HCC) [I48.91]  Acute cystitis without hematuria [N30.00]  Constipation, unspecified constipation type [K59.00]  Acute on chronic right-sided congestive heart failure (HCC) [I50.813]  Acute on chronic diastolic heart failure (HCC) [I50.33]                   Date / Time: 11/26/2024 11:55 AM    Patient Admission Status: Inpatient   Readmission Risk (Low < 19, Mod (19-27), High > 27): Readmission Risk Score: 13.4    Current PCP: John Bowers MD  PCP verified by CM? Yes    Chart Reviewed: Yes      History Provided by: Patient, Child/Family  Patient Orientation: Alert and Oriented    Patient Cognition: Alert    Hospitalization in the last 30 days (Readmission):  No    If yes, Readmission Assessment in  Navigator will be completed.    Advance Directives:      Code Status: DNR-CCA   Patient's Primary Decision Maker is: Legal Next of Kin      Discharge Planning:    Patient lives with: Children Type of Home: House  Primary Care Giver: Family  Patient Support Systems include: Family Members, Children   Current Financial resources: Medicare  Current community resources:    Current services prior to admission: Oxygen Therapy            Current DME:              Type of Home Care services:  None    ADLS  Prior functional level: Assistance with the following:, Bathing, Cooking, Housework, Shopping  Current functional level: Assistance with the following:, Bathing, Cooking, Housework, Shopping    PT AM-PAC: 17 /24  OT AM-PAC: 11 /24    Family can provide assistance at DC: Yes  Would you like Case Management to discuss the discharge plan with

## 2024-11-27 NOTE — PROGRESS NOTES
[facility]  Office: [phone]  Brian Smith DO, Bertin Rowan DO, Marko Lombardo DO, Luan Quiñonez DO, Berenice Gavin MD, Elissa Ferguson MD, Erin Claudio MD, Lilia Caceres MD,  Kosta Nunn MD, Geremias Balderrama MD, Jayson Hernandez MD,  Ting Vaz DO, Angie Nickerson MD, Nolan Strickland MD, Aravind Smith DO, Lore Ibarra MD,  Jordin Krishna DO, No García MD, Beti Zhu MD, Hawa Baker MD, Danitza Hyman MD,  David Montgomery MD, Gurmeet Ellis MD, Brigida Waggoner MD, Ruby Burrell MD, Eliezer Jasso MD, Trina Urbina MD, Leonardo Jordan DO, Shan Keller MD, Shirley Waterhouse, CNP,  Roxanna Liang CNP, Leonardo Patel, CNP,  Michaelle Veloz, HOSEA, Mercedes Mederos, CNP, Janie Coronel, CNP, Priscila Boateng, CNP, Loulou Rubio, CNP, Kim Andrews PA-C, Tracy Leong PA-C, Annmarie Cramer, CNP, Vladislav Zabala, CNP,  Carlotta Mcdonough, CNP, Shonna Gonzalez, CNP,  Myriam Agrawal, CNP, Stephanie Hilario, CNP       [facility]      Daily Progress Note     Admit Date: 11/26/2024  Bed/Room No.  1010/1010-02  Admitting Physician : Erin Claudio MD  Code Status :Full Code  Hospital Day:  LOS: 1 day   Chief Complaint:     Chief Complaint   Patient presents with    Cough     Principal Problem:    Acute on chronic diastolic congestive heart failure (HCC)  Active Problems:    Acute cystitis without hematuria    COPD without exacerbation (HCC)    Stage 3b chronic kidney disease (HCC)    Acute respiratory distress    Chronic anticoagulation    Benign essential hypertension    Chronic respiratory failure with hypoxia    HTN (hypertension)    Atrial fibrillation (HCC)    Acute on chronic diastolic heart failure (HCC)  Resolved Problems:    * No resolved hospital problems. *    Subjective :        Interval History/Significant events :  11/27/24    Patient is asking repeatedly when she can go home.  Patient continues to have A-fib with heart rate in 140s.  She has palpitations.

## 2024-11-27 NOTE — PROGRESS NOTES
Comprehensive Nutrition Assessment    Type and Reason for Visit:  Positive nutrition screen (Unplanned weight loss)    Nutrition Recommendations/Plan:   ADULT DIET; Regular; No Added Salt (3-4 gm); 1500 ml   Start Ensure Plus High Protein 2x/day  Monitor p.o intakes and labs     Malnutrition Assessment:  Malnutrition Status:  Severe malnutrition (11/27/24 1442)    Context:  Chronic Illness     Findings of the 6 clinical characteristics of malnutrition:  Energy Intake:  75% or less estimated energy requirements for 1 month or longer  Weight Loss:  Greater than 7.5% over 3 months     Body Fat Loss:  Severe body fat loss Triceps, Orbital   Muscle Mass Loss:  Severe muscle mass loss Temples (temporalis), Clavicles (pectoralis & deltoids)  Fluid Accumulation:  No fluid accumulation Extremities   Strength:  Not Performed    Nutrition Assessment:    Patient admission is related to hypoxia, A-fib RVR, acute cystitis without hematuria, constipation and CHF. Patient reported worsening symptoms over the past 2-3 days. Patient has a medical history for A-fib, CHF, COPD, TIMOTEO and liver cyst. Patient reports she is eating better today and ate some at lunch. Positive nutrition screen received for unplanned weight loss. Patient reports a usual weight of 120 lbs a few years ago. Electronic weight records indicate was at 97 lbs on 7/21/24 and now current weight is 80 lbs. Patient has lost 17.5% over 4 months. Patient lives at home with her son and takes Ensure supplements at home. Patient agreed to Ensure Plus High Protein 2x/day. Continue current diet. Monitor p.o intakes and labs.    Nutrition Related Findings:    No edema. Active bowel sounds. Constipation Wound Type: None       Current Nutrition Intake & Therapies:    Average Meal Intake: 26-50%  Average Supplements Intake: None Ordered  ADULT DIET; Regular; No Added Salt (3-4 gm); 1500 ml    Anthropometric Measures:  Height: 149.9 cm (4' 11\")  Ideal Body Weight (IBW): 95 lbs

## 2024-11-27 NOTE — PROGRESS NOTES
year)  Perception  Overall Perceptual Status: Impaired               Education Given To: Patient  Education Provided: Role of Therapy;Plan of Care;Transfer Training;Energy Conservation;Fall Prevention Strategies;Orientation;Mobility Training  Education Provided Comments: safety in function, call light use, recommendations for continued therapy, pursed lip breathing, proper bed mob tech, postural control and weight shifting  Education Method: Demonstration;Verbal  Barriers to Learning: Cognition;Hearing;Vision (agitation)  Education Outcome: Continued education needed              Co-treatment with PT warranted secondary to decreased safety and independence requiring 2 skilled therapy professionals to address individual discipline's goals. OT addressing preparation for ADL transfer, sitting/standing balance for increased ADL performance, sitting/activity tolerance, functional reaching, environmental safety/scanning, fall prevention, functional mobility for ADL transfers, ability to sequence and follow directions, bed mobility tech, and functional UE strength.                                                AM-PAC - ADL   11         Goals  Short Term Goals  Time Frame for Short Term Goals: by discharge, pt to demo  Short Term Goal 1: bed mob tasks with use of rail as needed to SBA.  Short Term Goal 2: increase BUE strength by a 1/2 grade to assist with ADL's.  Short Term Goal 3: UB ADL to set up and LB ADL to min assist with use of AD/AE as needed.  Short Term Goal 4: ADL transfers and functional mob with AD to CGA.  Short Term Goal 5: toileting tasks with use of grab bar/AD to min assist.  Long Term Goals  Long Term Goal 1: Pt to stand with SBA and AD masha > 5 mins as able to reduce falls.  Long Term Goal 2: Caregivers to be I with CHF edu, pressure relief, EC/WS and fall prevention tech, BUE HEP, and DME/AE and AD recommendations with use of handouts.  Patient Goals   Patient goals : Pt stated she is leaving at  9am!      Therapy Time   Individual Concurrent Group Co-treatment   Time In 0831 (plus 10 min chart review/nursing communcation)         Time Out 0932         Minutes 61=71 mins total          Timed Code Treatment Minutes: 56 Minutes       Kami Stevens, OT

## 2024-11-27 NOTE — H&P
[facility]  Office: [phone]  Brian Smith DO, Bertin Rowan DO, Marko Lombardo DO, Luan Quiñonez DO, Berenice Gavin MD, Elissa Ferguson MD, Erin Claudio MD, Lilia Caceres MD,  Kosta Nunn MD, Geremias Balderrama MD, Jyason Hernandez MD,  Ting Vaz DO, Angie Nickerson MD, Nolan Strickland MD, Aravind Smith DO, Lore Ibarra MD,  Jordin Krishna DO, No García MD, Beti Zhu MD, Hawa Baker MD, Danitza Hyman MD,  David Montgomery MD, Gurmeet Ellis MD, Brigida Waggoner MD, Ruby Burrell MD, Eliezer Jasso MD, Trina Urbina MD, William Avila DO, Bird Mcintosh DO, Marty Travis MD,  Shan Keller MD, Shirley Waterhouse, CNP,  Roxanna Liang, CNP, Leonardo Patel, CNP,  Michaelle Veloz, DNP, Mercedes Mederos, CNP, Janie Coronel, CNP, Asia Boothe CNP, Priscila Boateng, CNP, Loulou Rubio, CNP, Kim Andrews, PA-C, Tracy Leong, PA-C, Annmarie Cramer, CNP, Negar Lopez, CNP, Shonna Gonzalez, CNP, Sofia Carpio, CNS, Tesha Cintron, CNP, Myriam Agrawal, CNP, Tracy Schwab, CNP         [facility]   IN-PATIENT SERVICE   [facility]    HISTORY AND PHYSICAL EXAMINATION            Date:   11/26/2024  Patient name:  Natasha Walls  Date of admission:  11/26/2024 11:55 AM  MRN:   [id number]  Account:  [id number]  YOB: 1935  PCP:    John Bowers MD  Room:   [address] [place] [address]  Code Status:    Full Code    Chief Complaint:     Chief Complaint   Patient presents with    Cough       History Obtained From:     patient, electronic medical record    History of Present Illness:     Natasha Walls is a 89 y.o. Non- / non  female who presents with Cough   and is admitted to the hospital for the management of Acute on chronic diastolic congestive heart failure (HCC).    Patient presents emergency department with complaints of worsening shortness of breath, cough and abdominal pain.  He was recently discharged from [facility] on 11/7/2024 after an

## 2024-11-27 NOTE — PROGRESS NOTES
Patient confused & agitated last evening.  Received order for one-time Ativan 0.5 mg IVP, which was ineffective.  Patient requests frequently to use bathroom, despite clear, light urine urine suctioning through Purewick.  Patient alert & oriented to self & place, but repeatedly asks same questions just answered.  Patient's son, Mark, called & states patient takes Valium 5 mg PO three times daily & Ambien 5 mg HS, which this writer received orders from on-call & were given.  Patient able to sleep for a few hours, but then wakes up asking for some else to sleep.  In-house NP ordered PRN Benadryl IVP which was given.  Virtual  at bedside.  Patient periodically attempts to get out of bed, forgetting she has Purewick.  Frequent reminders to patient to remain in bed & alert staff for needs.  Side rails x2 raised & bed alarm activated for patient safety.

## 2024-11-27 NOTE — PROGRESS NOTES
Transitions of Care Pharmacy Service   Medication Review    The patient's list of current home medications has been reviewed.     Source(s) of information: Surescripts, Care everywhere, PDMP report, patient    Based on information provided by the above source(s), I have updated the patient's home med list as described below.     Please review the ACTION REQUESTED section of this note below for any discrepancies on current hospital orders.      I changed or updated the following medications on the patient's home medication list:  Removed Bisacodyl  Lotrisone  Percocet  Tetrahydrozoline      Added Albuterol  Trelegy Ellipta     Adjusted   Diltiazem  Furosemide  Glycolax  Ambien     Other Notes Patient states she only takes furosemide PRN         PROVIDER ACTION REQUESTED  Medications that need to be addressed by a physician/nurse practitioner:    Current inpatient order(s) Action requested by pharmacy   None Patient uses Trelegy Ellipta and albuterol inhalers at home. Please consider ordering if appropriate.        Please feel free to call me with any questions about this encounter. Thank you.    Mary Flores RPH   Transitions of Care Pharmacy Service  Phone:  [phone]  Fax: [phone]      Electronically signed by Mary Flores RP on 11/27/2024 at 2:03 PM     Prior to Admission medications    Medication Sig Start Date End Date Taking? Authorizing Provider   dilTIAZem (CARDIZEM CD) 240 MG extended release capsule Take 1 capsule by mouth daily 10/31/23  Yes Provider, MD iNkkie   albuterol sulfate HFA (PROVENTIL;VENTOLIN;PROAIR) 108 (90 Base) MCG/ACT inhaler Inhale 2 puffs into the lungs every 4 hours as needed 10/23/24  Yes Provider, MD Nikkie   fluticasone-umeclidin-vilant (TRELEGY ELLIPTA) 100-62.5-25 MCG/ACT AEPB inhaler Inhale 1 puff into the lungs daily 12/13/23  Yes Provider, MD Nikkie   zolpidem (AMBIEN) 5 MG tablet Take 1 tablet by mouth nightly as needed for Sleep.   Yes

## 2024-11-27 NOTE — PROGRESS NOTES
Physical Therapy  Facility/Department: [facility] CARE  Physical Therapy Initial Assessment    Name: Natasha Walls  : 1935  MRN: [id number]  Date of Service: 2024    Discharge Recommendations:  Patient would benefit from continued therapy after discharge, 24 hour supervision or assist, Therapy recommended at discharge            Pt presented to ED on 24 with complaints of worsening shortness of breath, cough and abdominal pain.  He was recently discharged from [facility] on 2024 after an extended stay with acute on chronic diastolic heart failure.  Per report of the emergency department, patient has had recent contact with sick individuals.  Upon arrival to the emergency department her blood pressure was 91/74 with a heart rate of 96 and a temperature 36.4 °C.     Blood work in the ER returned significant for proBNP 3615, troponin 21.  Urinalysis revealed moderate leukocytes and few yeast.  Chest x-ray revealed vascular congestion, bibasilar opacities favoring atelectasis with evidence of COPD.  CT abdomen and pelvis revealed small left pleural effusion.     Pt admitted for further medical management of Acute on chronic diastolic congestive heart failure.    RN reports patient is medically stable for therapy treatment this date.    Chart reviewed prior to treatment and patient is agreeable for therapy.           Patient Diagnosis(es): The primary encounter diagnosis was Hypoxia. Diagnoses of Acute cystitis without hematuria, Atrial fibrillation with RVR (HCC), Acute on chronic right-sided congestive heart failure (HCC), and Constipation, unspecified constipation type were also pertinent to this visit.  Past Medical History:  has a past medical history of A-fib (HCC), Atrial fibrillation (HCC), Benign essential hypertension, CHF (congestive heart failure) (HCC), COPD (chronic obstructive pulmonary disease) (HCC), Diverticulosis, TIMOTEO (generalized anxiety disorder), H/O blood clots,

## 2024-11-28 LAB
ANION GAP SERPL CALCULATED.3IONS-SCNC: 13 MMOL/L (ref 9–16)
BUN SERPL-MCNC: 35 MG/DL (ref 8–23)
CALCIUM SERPL-MCNC: 9.6 MG/DL (ref 8.8–10.2)
CHLORIDE SERPL-SCNC: 95 MMOL/L (ref 98–107)
CO2 SERPL-SCNC: 30 MMOL/L (ref 20–31)
CREAT SERPL-MCNC: 1.5 MG/DL (ref 0.5–0.9)
EKG ATRIAL RATE: 163 BPM
EKG Q-T INTERVAL: 360 MS
EKG QRS DURATION: 112 MS
EKG QTC CALCULATION (BAZETT): 504 MS
EKG R AXIS: -84 DEGREES
EKG T AXIS: -24 DEGREES
EKG VENTRICULAR RATE: 118 BPM
ERYTHROCYTE [DISTWIDTH] IN BLOOD BY AUTOMATED COUNT: 13.3 % (ref 11.8–14.4)
GFR, ESTIMATED: 33 ML/MIN/1.73M2
GLUCOSE SERPL-MCNC: 110 MG/DL (ref 82–115)
HCT VFR BLD AUTO: 39.8 % (ref 36.3–47.1)
HGB BLD-MCNC: 13.4 G/DL (ref 11.9–15.1)
MCH RBC QN AUTO: 33.2 PG (ref 25.2–33.5)
MCHC RBC AUTO-ENTMCNC: 33.7 G/DL (ref 28.4–34.8)
MCV RBC AUTO: 98.5 FL (ref 82.6–102.9)
NRBC BLD-RTO: 0 PER 100 WBC
PLATELET # BLD AUTO: 321 K/UL (ref 138–453)
PMV BLD AUTO: 10.3 FL (ref 8.1–13.5)
POTASSIUM SERPL-SCNC: 4.3 MMOL/L (ref 3.7–5.3)
RBC # BLD AUTO: 4.04 M/UL (ref 3.95–5.11)
SODIUM SERPL-SCNC: 138 MMOL/L (ref 136–145)
WBC OTHER # BLD: 10.4 K/UL (ref 3.5–11.3)

## 2024-11-28 PROCEDURE — 2580000003 HC RX 258: Performed by: NURSE PRACTITIONER

## 2024-11-28 PROCEDURE — 6370000000 HC RX 637 (ALT 250 FOR IP): Performed by: NURSE PRACTITIONER

## 2024-11-28 PROCEDURE — 6360000002 HC RX W HCPCS: Performed by: FAMILY MEDICINE

## 2024-11-28 PROCEDURE — 80048 BASIC METABOLIC PNL TOTAL CA: CPT

## 2024-11-28 PROCEDURE — 6370000000 HC RX 637 (ALT 250 FOR IP): Performed by: FAMILY MEDICINE

## 2024-11-28 PROCEDURE — 99232 SBSQ HOSP IP/OBS MODERATE 35: CPT | Performed by: FAMILY MEDICINE

## 2024-11-28 PROCEDURE — 36415 COLL VENOUS BLD VENIPUNCTURE: CPT

## 2024-11-28 PROCEDURE — 85027 COMPLETE CBC AUTOMATED: CPT

## 2024-11-28 PROCEDURE — 94761 N-INVAS EAR/PLS OXIMETRY MLT: CPT

## 2024-11-28 PROCEDURE — 2700000000 HC OXYGEN THERAPY PER DAY

## 2024-11-28 PROCEDURE — 6360000002 HC RX W HCPCS: Performed by: NURSE PRACTITIONER

## 2024-11-28 PROCEDURE — 6370000000 HC RX 637 (ALT 250 FOR IP): Performed by: INTERNAL MEDICINE

## 2024-11-28 PROCEDURE — 6370000000 HC RX 637 (ALT 250 FOR IP)

## 2024-11-28 PROCEDURE — 1200000000 HC SEMI PRIVATE

## 2024-11-28 RX ORDER — MIDODRINE HYDROCHLORIDE 5 MG/1
5 TABLET ORAL
Status: DISCONTINUED | OUTPATIENT
Start: 2024-11-28 | End: 2024-11-29

## 2024-11-28 RX ORDER — LEVOFLOXACIN 500 MG/1
250 TABLET, FILM COATED ORAL DAILY
Status: DISCONTINUED | OUTPATIENT
Start: 2024-11-28 | End: 2024-11-29

## 2024-11-28 RX ORDER — BISACODYL 10 MG
10 SUPPOSITORY, RECTAL RECTAL DAILY PRN
Status: DISCONTINUED | OUTPATIENT
Start: 2024-11-28 | End: 2024-11-30 | Stop reason: HOSPADM

## 2024-11-28 RX ORDER — FUROSEMIDE 10 MG/ML
40 INJECTION INTRAMUSCULAR; INTRAVENOUS 2 TIMES DAILY
Status: DISCONTINUED | OUTPATIENT
Start: 2024-11-28 | End: 2024-11-28

## 2024-11-28 RX ORDER — DILTIAZEM HYDROCHLORIDE 120 MG/1
120 CAPSULE, COATED, EXTENDED RELEASE ORAL DAILY
Status: DISCONTINUED | OUTPATIENT
Start: 2024-11-29 | End: 2024-11-30

## 2024-11-28 RX ORDER — FUROSEMIDE 20 MG/1
20 TABLET ORAL DAILY
Status: DISCONTINUED | OUTPATIENT
Start: 2024-11-28 | End: 2024-11-29

## 2024-11-28 RX ORDER — FUROSEMIDE 10 MG/ML
20 INJECTION INTRAMUSCULAR; INTRAVENOUS 2 TIMES DAILY
Status: DISCONTINUED | OUTPATIENT
Start: 2024-11-28 | End: 2024-11-28

## 2024-11-28 RX ADMIN — MIDODRINE HYDROCHLORIDE 5 MG: 5 TABLET ORAL at 16:15

## 2024-11-28 RX ADMIN — DIGOXIN 62.5 MCG: 125 TABLET ORAL at 09:30

## 2024-11-28 RX ADMIN — SODIUM CHLORIDE, PRESERVATIVE FREE 10 ML: 5 INJECTION INTRAVENOUS at 09:19

## 2024-11-28 RX ADMIN — SODIUM CHLORIDE, PRESERVATIVE FREE 10 ML: 5 INJECTION INTRAVENOUS at 19:37

## 2024-11-28 RX ADMIN — FUROSEMIDE 20 MG: 20 TABLET ORAL at 15:32

## 2024-11-28 RX ADMIN — APIXABAN 2.5 MG: 2.5 TABLET, FILM COATED ORAL at 19:37

## 2024-11-28 RX ADMIN — APIXABAN 2.5 MG: 2.5 TABLET, FILM COATED ORAL at 09:30

## 2024-11-28 RX ADMIN — DILTIAZEM HYDROCHLORIDE 240 MG: 240 CAPSULE, COATED, EXTENDED RELEASE ORAL at 09:30

## 2024-11-28 RX ADMIN — LEVOFLOXACIN 250 MG: 500 TABLET, FILM COATED ORAL at 12:58

## 2024-11-28 RX ADMIN — ONDANSETRON 4 MG: 2 INJECTION INTRAMUSCULAR; INTRAVENOUS at 19:07

## 2024-11-28 RX ADMIN — ACETAMINOPHEN 650 MG: 325 TABLET ORAL at 21:11

## 2024-11-28 RX ADMIN — ZOLPIDEM TARTRATE 5 MG: 5 TABLET, COATED ORAL at 21:11

## 2024-11-28 RX ADMIN — METOPROLOL SUCCINATE 25 MG: 25 TABLET, EXTENDED RELEASE ORAL at 16:15

## 2024-11-28 RX ADMIN — MIDODRINE HYDROCHLORIDE 5 MG: 5 TABLET ORAL at 09:48

## 2024-11-28 ASSESSMENT — ENCOUNTER SYMPTOMS
SHORTNESS OF BREATH: 1
RHINORRHEA: 0
VOMITING: 0
COUGH: 0
BLOOD IN STOOL: 0
ABDOMINAL PAIN: 0
WHEEZING: 0
DIARRHEA: 0
CHEST TIGHTNESS: 0
CONSTIPATION: 0
NAUSEA: 0

## 2024-11-28 ASSESSMENT — PAIN DESCRIPTION - ORIENTATION: ORIENTATION: MID

## 2024-11-28 ASSESSMENT — PAIN DESCRIPTION - DESCRIPTORS: DESCRIPTORS: SHARP

## 2024-11-28 ASSESSMENT — PAIN SCALES - GENERAL
PAINLEVEL_OUTOF10: 5
PAINLEVEL_OUTOF10: 0

## 2024-11-28 ASSESSMENT — PAIN DESCRIPTION - LOCATION: LOCATION: ABDOMEN

## 2024-11-28 NOTE — CONSULTS
from [facility]    Overall Financial Resource Strain (CARDIA)     Difficulty of Paying Living Expenses: Patient unable to answer   Recent Concern: Financial Resource Strain - Medium Risk (8/8/2024)    Received from [facility]    Overall Financial Resource Strain (CARDIA)     Difficulty of Paying Living Expenses: Somewhat hard   Food Insecurity: Patient Declined (11/26/2024)    Hunger Vital Sign     Worried About Running Out of Food in the Last Year: Patient declined     Ran Out of Food in the Last Year: Patient declined   Transportation Needs: Patient Declined (11/26/2024)    PRAPARE - Transportation     Lack of Transportation (Medical): Patient declined     Lack of Transportation (Non-Medical): Patient declined   Physical Activity: Inactive (10/4/2021)    Exercise Vital Sign     Days of Exercise per Week: 0 days     Minutes of Exercise per Session: 0 min   Stress: No Stress Concern Present (10/4/2021)    [employer] - Occupational Stress Questionnaire     Feeling of Stress : Not at all   Social Connections: Socially Isolated (10/4/2021)    Social Connection and Isolation Panel [NHANES]     Frequency of Communication with Friends and Family: Never     Frequency of Social Gatherings with Friends and Family: Never     Attends Protestant Services: Never     Active Member of Clubs or Organizations: Yes     Attends Club or Organization Meetings: Never     Marital Status:    Intimate Partner Violence: Unknown (8/30/2024)    Received from [facility]    Humiliation, Afraid, Rape, and Kick questionnaire     Fear of Current or Ex-Partner: Patient unable to answer     Emotionally Abused: Not on file     Physically Abused: Not on file     Sexually Abused: Not on file   Housing Stability: Patient Declined (11/26/2024)    Housing Stability Vital Sign     Unable to Pay for Housing in the Last Year: Patient declined     Number of Times Moved in the Last  TRIG 84 12/21/2021 06:32 AM    HDL 37 12/21/2021 06:32 AM         IMPRESSION  Acute on chronic diastolic CHF  Chronic NT coagulation  Chronic atrial fibrillation  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  Permanent pacemaker in situ    Patient Active Problem List   Diagnosis    History of hysterectomy    Cardiac resynchronization therapy pacemaker (CRT-P) in place    A-fib (HCC)    History of cardioversion    Rectocele    Diverticulosis    H/O blood clots    COPD without exacerbation (HCC)    Acute on chronic diastolic congestive heart failure (HCC)    Fecal impaction (HCC)    Constipation    Stage 3b chronic kidney disease (HCC)    TIMOTEO (generalized anxiety disorder)    Congestive heart failure with unknown left ventricular ejection fraction (HCC)    Acute respiratory distress    Chronic anticoagulation    Mild malnutrition (HCC)    Acute cystitis without hematuria    Benign essential hypertension    Chronic respiratory failure with hypoxia    Dyspnea on exertion    HTN (hypertension)    Atrial fibrillation (HCC)    Acute on chronic diastolic heart failure (HCC)    Legally blind    Severe malnutrition (HCC)           RECOMMENDATIONS:     Continue current medications  Management plan was discussed with patient       Patient has chronic low blood pressure however she is in need of multiple other medications to control CHF and heart rate  However, there is no immediate need to keep her on lisinopril and we can lower the dose of Cardizem which has no value in treating her CHF but it can help in controlling her heart rate however knowing low blood pressure , in my opinion, patient will benefit from lowering the dose of Cardizem and concentrate on digoxin for rate control since this medicine will not affect her blood pressure , in addition to the usual beta-blocker and spironolactone.  Recommend to switch to oral Lasix and lower the total dose per day  Patient is on midodrine and we have to balance between

## 2024-11-28 NOTE — CARE COORDINATION
DC Planning    Spoke w pt at bedside, telesitter in place lives w son, appears ill on 3l. Pt is a DNRCCA-may benefit from palliative/hospice consult.  Left HHC list in room-will need to f/u w family on HHC/SNF.                       Post Acute Facility/Agency List     Provided patient with the following list, the list includes the overall star ratings obtained from CMS per the Medicare Web site (www.Medicare.gov):     [] Long Term Acute Care Facilities  [] Acute Inpatient Rehabilitation Facilities  [] Skilled Nursing Facilities  [] Hospice Facilities  [x] Home Care    Provided verbal instructions on how to utilize the QR Code to obtain additional detailed star ratings from www.Medicare.gov     offered to print and provide the detailed list:    []Accepted   [x]Declined

## 2024-11-28 NOTE — PROGRESS NOTES
Dr. Claudio notified of low blood pressures and elevated heart rate 120's-130's. Oxygen also bumped up to 3L/NC to maintain oxygen sat of 92%. See all orders.

## 2024-11-28 NOTE — PROGRESS NOTES
Physical Therapy  DATE: 2024    NAME: Natasha Walls  MRN: [id number]   : 1935    Patient not seen this date for Physical Therapy due to:      [] Cancel by RN or physician due to:    [] Hemodialysis    [] Critical Lab Value Level     [] Blood transfusion in progress    [x] Acute or unstable cardiovascular status (HR recently elevated in 120-130s at rest and O2 requirement increased at this time to 3L. Will cont to follow.)  _MAP < 55 or more than >115  _HR < 40 or > 130    [] Acute or unstable pulmonary status   -FiO2 > 60%   _RR < 5 or >40    _O2 sats < 85%    [] Strict Bedrest    [] Off Unit for surgery or procedure    [] Off Unit for testing       [] Pending imaging to R/O fracture    [] Refusal by Patient      [] Other      [] PT being discontinued at this time. Patient independent. No further needs.     [] PT being discontinued at this time as the patient has been transferred to hospice care. No further needs.      Reba Smith, PTA

## 2024-11-28 NOTE — PROGRESS NOTES
Occupational Therapy  [facility]  Occupational Therapy Not Seen    DATE: 2024    NAME: Natasha Walls  MRN: [id number]   : 1935    Patient not seen this date for Occupational Therapy due to:      [] Cancel by RN or physician due to:    [] Hemodialysis    [] Critical Lab Value Level     [] Blood transfusion in progress    [] Acute or unstable cardiovascular status   _MAP < 55 or more than >115  _HR < 40 or > 130    [] Acute or unstable pulmonary status   -FiO2 > 60%   _RR < 5 or >40    _O2 sats < 85%    [] Strict Bedrest    [] Off Unit for surgery or procedure    [] Off Unit for testing       [] Pending imaging to R/O fracture    [x] Refusal by Patient: Pt in bed sleeping upon arrival with breakfast tray present. Pt adamantly refused all OOB activity stating \"I don't want to eat, let me sleep!\" Offered to assist pt to bathroom to which pt replied \"NO, let me sleep!\" Will cont to follow.      [] Other      [] OT being discontinued at this time. Patient independent. No further needs.     [] OT being discontinued at this time as the patient has been transferred to hospice care. No further needs.      HERLINDA Quiñones

## 2024-11-28 NOTE — PROGRESS NOTES
assistance of a speech recognition program. While intending to generate a document that actually reflects the content of the visit, the document can still have some errors including those of syntax and sound a like substitutions which may escape proof reading. In such instances, actual meaning can be extrapolated by contextual diversion.)      Erin Claudio MD  11/28/2024

## 2024-11-28 NOTE — PROGRESS NOTES
Patient had relatively uneventful evening.  SaO2 at beginning of shift = 85% on room air.  Placed on 2L/NC, SaO2 = 95%.  Crackles on auscultation.  Patient received IV Lasix just before shift change.  Patient up with assist with walker to bathroom to void.  Patient has Virtual  in room d/t attempting to get out of bed without alerting staff, but patient is calling out to use bathroom now.  Patient requested & received PO Valium & Ambien at bedside.  Side rails x2 raised & bed alarm activated for patient safety.  Possible discharge home with home care.  Patient lives with son, and patient refuses SNF.  Alert & oriented x4.

## 2024-11-28 NOTE — PROGRESS NOTES
Dr. Frey rounded and assessed patient. BP now 98/65 after giving midodrine. He states he will dc lisinopril, change lasix to oral, decrease cardizem. ( He's aware writer gave cardizem and dig per Dr. Claudio already) Also stagger Toprol and lasix. Heart rate better at this time trending 90's-110's.

## 2024-11-29 ENCOUNTER — APPOINTMENT (OUTPATIENT)
Dept: GENERAL RADIOLOGY | Age: 88
End: 2024-11-29
Payer: COMMERCIAL

## 2024-11-29 LAB
ANION GAP SERPL CALCULATED.3IONS-SCNC: 12 MMOL/L (ref 9–16)
BUN SERPL-MCNC: 41 MG/DL (ref 8–23)
CALCIUM SERPL-MCNC: 9.1 MG/DL (ref 8.8–10.2)
CHLORIDE SERPL-SCNC: 97 MMOL/L (ref 98–107)
CO2 SERPL-SCNC: 29 MMOL/L (ref 20–31)
CREAT SERPL-MCNC: 1.4 MG/DL (ref 0.5–0.9)
ERYTHROCYTE [DISTWIDTH] IN BLOOD BY AUTOMATED COUNT: 13.3 % (ref 11.8–14.4)
GFR, ESTIMATED: 35 ML/MIN/1.73M2
GLUCOSE SERPL-MCNC: 99 MG/DL (ref 82–115)
HCT VFR BLD AUTO: 40 % (ref 36.3–47.1)
HGB BLD-MCNC: 13.1 G/DL (ref 11.9–15.1)
MCH RBC QN AUTO: 33.7 PG (ref 25.2–33.5)
MCHC RBC AUTO-ENTMCNC: 32.8 G/DL (ref 28.4–34.8)
MCV RBC AUTO: 102.8 FL (ref 82.6–102.9)
NRBC BLD-RTO: 0 PER 100 WBC
PLATELET # BLD AUTO: 304 K/UL (ref 138–453)
PMV BLD AUTO: 10.3 FL (ref 8.1–13.5)
POTASSIUM SERPL-SCNC: 4.3 MMOL/L (ref 3.7–5.3)
RBC # BLD AUTO: 3.89 M/UL (ref 3.95–5.11)
SODIUM SERPL-SCNC: 138 MMOL/L (ref 136–145)
WBC OTHER # BLD: 10.1 K/UL (ref 3.5–11.3)

## 2024-11-29 PROCEDURE — 2700000000 HC OXYGEN THERAPY PER DAY

## 2024-11-29 PROCEDURE — 6370000000 HC RX 637 (ALT 250 FOR IP)

## 2024-11-29 PROCEDURE — 80048 BASIC METABOLIC PNL TOTAL CA: CPT

## 2024-11-29 PROCEDURE — 6370000000 HC RX 637 (ALT 250 FOR IP): Performed by: FAMILY MEDICINE

## 2024-11-29 PROCEDURE — 6360000002 HC RX W HCPCS: Performed by: FAMILY MEDICINE

## 2024-11-29 PROCEDURE — 36415 COLL VENOUS BLD VENIPUNCTURE: CPT

## 2024-11-29 PROCEDURE — 71045 X-RAY EXAM CHEST 1 VIEW: CPT

## 2024-11-29 PROCEDURE — 97116 GAIT TRAINING THERAPY: CPT

## 2024-11-29 PROCEDURE — 6360000002 HC RX W HCPCS: Performed by: NURSE PRACTITIONER

## 2024-11-29 PROCEDURE — 97530 THERAPEUTIC ACTIVITIES: CPT

## 2024-11-29 PROCEDURE — 94640 AIRWAY INHALATION TREATMENT: CPT

## 2024-11-29 PROCEDURE — 1200000000 HC SEMI PRIVATE

## 2024-11-29 PROCEDURE — 97535 SELF CARE MNGMENT TRAINING: CPT

## 2024-11-29 PROCEDURE — 85027 COMPLETE CBC AUTOMATED: CPT

## 2024-11-29 PROCEDURE — 94761 N-INVAS EAR/PLS OXIMETRY MLT: CPT

## 2024-11-29 PROCEDURE — 6370000000 HC RX 637 (ALT 250 FOR IP): Performed by: NURSE PRACTITIONER

## 2024-11-29 PROCEDURE — 2580000003 HC RX 258: Performed by: NURSE PRACTITIONER

## 2024-11-29 PROCEDURE — 99232 SBSQ HOSP IP/OBS MODERATE 35: CPT | Performed by: FAMILY MEDICINE

## 2024-11-29 RX ORDER — FUROSEMIDE 20 MG/1
20 TABLET ORAL DAILY
Status: DISCONTINUED | OUTPATIENT
Start: 2024-11-29 | End: 2024-11-30 | Stop reason: HOSPADM

## 2024-11-29 RX ORDER — GUAIFENESIN 600 MG/1
600 TABLET, EXTENDED RELEASE ORAL 2 TIMES DAILY
Status: DISCONTINUED | OUTPATIENT
Start: 2024-11-29 | End: 2024-11-30 | Stop reason: HOSPADM

## 2024-11-29 RX ORDER — TRAMADOL HYDROCHLORIDE 50 MG/1
50 TABLET ORAL EVERY 4 HOURS PRN
Status: DISCONTINUED | OUTPATIENT
Start: 2024-11-29 | End: 2024-11-30 | Stop reason: HOSPADM

## 2024-11-29 RX ORDER — SENNOSIDES A AND B 8.6 MG/1
1 TABLET, FILM COATED ORAL NIGHTLY
Status: DISCONTINUED | OUTPATIENT
Start: 2024-11-29 | End: 2024-11-30 | Stop reason: HOSPADM

## 2024-11-29 RX ORDER — LEVOFLOXACIN 250 MG/1
250 TABLET, FILM COATED ORAL DAILY
Status: DISCONTINUED | OUTPATIENT
Start: 2024-11-30 | End: 2024-11-30 | Stop reason: HOSPADM

## 2024-11-29 RX ORDER — ALBUTEROL SULFATE 0.83 MG/ML
2.5 SOLUTION RESPIRATORY (INHALATION) EVERY 6 HOURS PRN
Status: DISCONTINUED | OUTPATIENT
Start: 2024-11-29 | End: 2024-11-29

## 2024-11-29 RX ORDER — ALBUTEROL SULFATE 0.83 MG/ML
2.5 SOLUTION RESPIRATORY (INHALATION)
Status: DISCONTINUED | OUTPATIENT
Start: 2024-11-29 | End: 2024-11-30 | Stop reason: HOSPADM

## 2024-11-29 RX ORDER — DOCUSATE SODIUM 100 MG/1
100 CAPSULE, LIQUID FILLED ORAL 2 TIMES DAILY
Status: DISCONTINUED | OUTPATIENT
Start: 2024-11-29 | End: 2024-11-30 | Stop reason: HOSPADM

## 2024-11-29 RX ORDER — ALBUTEROL SULFATE 0.83 MG/ML
2.5 SOLUTION RESPIRATORY (INHALATION) EVERY 4 HOURS PRN
Status: DISCONTINUED | OUTPATIENT
Start: 2024-11-29 | End: 2024-11-30 | Stop reason: HOSPADM

## 2024-11-29 RX ORDER — MIDODRINE HYDROCHLORIDE 10 MG/1
10 TABLET ORAL
Status: DISCONTINUED | OUTPATIENT
Start: 2024-11-29 | End: 2024-11-30 | Stop reason: HOSPADM

## 2024-11-29 RX ORDER — PROCHLORPERAZINE EDISYLATE 5 MG/ML
10 INJECTION INTRAMUSCULAR; INTRAVENOUS ONCE
Status: COMPLETED | OUTPATIENT
Start: 2024-11-30 | End: 2024-11-29

## 2024-11-29 RX ORDER — POLYETHYLENE GLYCOL 3350 17 G/17G
17 POWDER, FOR SOLUTION ORAL DAILY
Status: DISCONTINUED | OUTPATIENT
Start: 2024-11-29 | End: 2024-11-30 | Stop reason: HOSPADM

## 2024-11-29 RX ADMIN — BUDESONIDE AND FORMOTEROL FUMARATE DIHYDRATE 2 PUFF: 160; 4.5 AEROSOL RESPIRATORY (INHALATION) at 20:48

## 2024-11-29 RX ADMIN — APIXABAN 2.5 MG: 2.5 TABLET, FILM COATED ORAL at 20:27

## 2024-11-29 RX ADMIN — PROCHLORPERAZINE EDISYLATE 10 MG: 5 INJECTION INTRAMUSCULAR; INTRAVENOUS at 23:45

## 2024-11-29 RX ADMIN — DIAZEPAM 5 MG: 5 TABLET ORAL at 22:03

## 2024-11-29 RX ADMIN — DOCUSATE SODIUM 100 MG: 100 CAPSULE, LIQUID FILLED ORAL at 20:27

## 2024-11-29 RX ADMIN — APIXABAN 2.5 MG: 2.5 TABLET, FILM COATED ORAL at 08:03

## 2024-11-29 RX ADMIN — MIDODRINE HYDROCHLORIDE 10 MG: 10 TABLET ORAL at 17:32

## 2024-11-29 RX ADMIN — DILTIAZEM HYDROCHLORIDE 120 MG: 120 CAPSULE, COATED, EXTENDED RELEASE ORAL at 08:02

## 2024-11-29 RX ADMIN — POLYETHYLENE GLYCOL 3350 17 G: 17 POWDER, FOR SOLUTION ORAL at 13:12

## 2024-11-29 RX ADMIN — DIAZEPAM 5 MG: 5 TABLET ORAL at 14:40

## 2024-11-29 RX ADMIN — SODIUM CHLORIDE, PRESERVATIVE FREE 10 ML: 5 INJECTION INTRAVENOUS at 08:03

## 2024-11-29 RX ADMIN — ZOLPIDEM TARTRATE 5 MG: 5 TABLET, COATED ORAL at 22:03

## 2024-11-29 RX ADMIN — TRAMADOL HYDROCHLORIDE 50 MG: 50 TABLET, COATED ORAL at 13:10

## 2024-11-29 RX ADMIN — LEVOFLOXACIN 250 MG: 500 TABLET, FILM COATED ORAL at 08:03

## 2024-11-29 RX ADMIN — MIDODRINE HYDROCHLORIDE 10 MG: 10 TABLET ORAL at 13:10

## 2024-11-29 RX ADMIN — SODIUM CHLORIDE, PRESERVATIVE FREE 10 ML: 5 INJECTION INTRAVENOUS at 20:27

## 2024-11-29 RX ADMIN — ACETAMINOPHEN 650 MG: 325 TABLET ORAL at 05:47

## 2024-11-29 RX ADMIN — ALBUTEROL SULFATE 2.5 MG: 2.5 SOLUTION RESPIRATORY (INHALATION) at 15:33

## 2024-11-29 RX ADMIN — DOCUSATE SODIUM 100 MG: 100 CAPSULE, LIQUID FILLED ORAL at 13:10

## 2024-11-29 RX ADMIN — SENNOSIDES 8.6 MG: 8.6 TABLET, FILM COATED ORAL at 20:27

## 2024-11-29 RX ADMIN — GUAIFENESIN 600 MG: 600 TABLET ORAL at 20:27

## 2024-11-29 RX ADMIN — FUROSEMIDE 20 MG: 20 TABLET ORAL at 20:48

## 2024-11-29 RX ADMIN — TIOTROPIUM BROMIDE INHALATION SPRAY 2 PUFF: 3.12 SPRAY, METERED RESPIRATORY (INHALATION) at 09:19

## 2024-11-29 RX ADMIN — ONDANSETRON 4 MG: 2 INJECTION INTRAMUSCULAR; INTRAVENOUS at 22:19

## 2024-11-29 RX ADMIN — ALBUTEROL SULFATE 2.5 MG: 2.5 SOLUTION RESPIRATORY (INHALATION) at 20:48

## 2024-11-29 RX ADMIN — MIDODRINE HYDROCHLORIDE 10 MG: 10 TABLET ORAL at 08:02

## 2024-11-29 RX ADMIN — DIGOXIN 62.5 MCG: 125 TABLET ORAL at 08:02

## 2024-11-29 ASSESSMENT — ENCOUNTER SYMPTOMS
VOMITING: 0
COUGH: 0
NAUSEA: 0
WHEEZING: 0
DIARRHEA: 0
CONSTIPATION: 0
ABDOMINAL PAIN: 0
BLOOD IN STOOL: 0
RHINORRHEA: 0
CHEST TIGHTNESS: 0
SHORTNESS OF BREATH: 1

## 2024-11-29 ASSESSMENT — PAIN SCALES - GENERAL
PAINLEVEL_OUTOF10: 8
PAINLEVEL_OUTOF10: 6
PAINLEVEL_OUTOF10: 5
PAINLEVEL_OUTOF10: 5

## 2024-11-29 ASSESSMENT — PAIN DESCRIPTION - LOCATION
LOCATION: LEG
LOCATION: ABDOMEN

## 2024-11-29 ASSESSMENT — PAIN DESCRIPTION - DESCRIPTORS
DESCRIPTORS: ACHING
DESCRIPTORS: ACHING

## 2024-11-29 ASSESSMENT — PAIN - FUNCTIONAL ASSESSMENT: PAIN_FUNCTIONAL_ASSESSMENT: ACTIVITIES ARE NOT PREVENTED

## 2024-11-29 ASSESSMENT — PAIN DESCRIPTION - ORIENTATION
ORIENTATION: LEFT
ORIENTATION: RIGHT;LEFT

## 2024-11-29 NOTE — RT PROTOCOL NOTE
RT Inhaler-Nebulizer Bronchodilator Protocol Note    There is a bronchodilator order in the chart from a provider indicating to follow the RT Bronchodilator Protocol and there is an “Initiate RT Inhaler-Nebulizer Bronchodilator Protocol” order as well (see protocol at bottom of note).    CXR Findings:  XR CHEST PORTABLE    Result Date: 11/29/2024  Left basilar airspace disease and trace left pleural effusion.  This could represent atelectasis and/or pneumonia       The findings from the last RT Protocol Assessment were as follows:   History Pulmonary Disease: Chronic pulmonary disease  Respiratory Pattern: Regular pattern and RR 12-20 bpm  Breath Sounds: Slightly diminished and/or crackles  Cough: Strong, spontaneous, non-productive  Indication for Bronchodilator Therapy: Decreased or absent breath sounds, On home bronchodilators  Bronchodilator Assessment Score: 4    Aerosolized bronchodilator medication orders have been revised according to the RT Inhaler-Nebulizer Bronchodilator Protocol below.    Respiratory Therapist to perform RT Therapy Protocol Assessment initially then follow the protocol.  Repeat RT Therapy Protocol Assessment PRN for score 0-3 or on second treatment, BID, and PRN for scores above 3.    No Indications - adjust the frequency to every 6 hours PRN wheezing or bronchospasm, if no treatments needed after 48 hours then discontinue using Per Protocol order mode.     If indication present, adjust the RT bronchodilator orders based on the Bronchodilator Assessment Score as indicated below.  Use Inhaler orders unless patient has one or more of the following: on home nebulizer, not able to hold breath for 10 seconds, is not alert and oriented, cannot activate and use MDI correctly, or respiratory rate 25 breaths per minute or more, then use the equivalent nebulizer order(s) with same Frequency and PRN reasons based on the score.  If a patient is on this medication at home then do not decrease  Frequency below that used at home.    0-3 - enter or revise RT bronchodilator order(s) to equivalent RT Bronchodilator order with Frequency of every 4 hours PRN for wheezing or increased work of breathing using Per Protocol order mode.        4-6 - enter or revise RT Bronchodilator order(s) to two equivalent RT bronchodilator orders with one order with BID Frequency and one order with Frequency of every 4 hours PRN wheezing or increased work of breathing using Per Protocol order mode.        7-10 - enter or revise RT Bronchodilator order(s) to two equivalent RT bronchodilator orders with one order with TID Frequency and one order with Frequency of every 4 hours PRN wheezing or increased work of breathing using Per Protocol order mode.       11-13 - enter or revise RT Bronchodilator order(s) to one equivalent RT bronchodilator order with QID Frequency and an Albuterol order with Frequency of every 4 hours PRN wheezing or increased work of breathing using Per Protocol order mode.      Greater than 13 - enter or revise RT Bronchodilator order(s) to one equivalent RT bronchodilator order with every 4 hours Frequency and an Albuterol order with Frequency of every 2 hours PRN wheezing or increased work of breathing using Per Protocol order mode.     RT to enter RT Home Evaluation for COPD & MDI Assessment order using Per Protocol order mode.    Electronically signed by ASHLEY KITCHEN RCP on 11/29/2024 at 3:49 PM

## 2024-11-29 NOTE — PROGRESS NOTES
Section of Cardiology  Progress Note      Date:  11/29/2024  Patient: Natasha Walls  Admission:  11/26/2024 11:55 AM  Admit DX: Hypoxia [R09.02]  Atrial fibrillation with RVR (HCC) [I48.91]  Acute cystitis without hematuria [N30.00]  Constipation, unspecified constipation type [K59.00]  Acute on chronic right-sided congestive heart failure (HCC) [I50.813]  Acute on chronic diastolic heart failure (HCC) [I50.33]  Age:  89 y.o., 1935     LOS: 3 days     Reason for evaluation:   atrial fibrillation and CHF      SUBJECTIVE:     The patient was seen and examined. Notes and labs reviewed.  There were not complications over night.  Patient seen and examined in room with her nurse at bedside.  She is more alert comparing to yesterday.  She still has some abdominal discomfort.  She wants to go home?  Patient's cardiac review of systems: negative for chest pain, dyspnea, and irregular heart beat.  The patient is generally feeling unchanged.    OBJECTIVE:    Telemetry: Atrial fibrillation  BP (!) 84/55   Pulse 65   Temp 97.5 °F (36.4 °C) (Axillary)   Resp 18   Ht 1.499 m (4' 11\")   Wt 35.4 kg (78 lb)   SpO2 97%   BMI 15.75 kg/m²     Intake/Output Summary (Last 24 hours) at 11/29/2024 0828  Last data filed at 11/29/2024 0535  Gross per 24 hour   Intake 240 ml   Output --   Net 240 ml       EXAM:   CONSTITUTIONAL:  awake, alert, cooperative, no apparent distress, and appears stated age.  HEENT: Normal jugular venous pulsations, no carotid bruits. Head is atraumatic, normocephalic. Eyes and oral mucosa are normal.  LUNGS: Good respiratory effort. No for increased work of breathing. On auscultation: clear to auscultation bilaterally  CARDIOVASCULAR:  Normal apical impulse, irregular rate and rhythm, normal S1 and S2,   ABDOMEN: Soft, nontender, nondistended. Bowel sounds present.    SKIN: Warm and dry.  EXTREMITIES: No lower extremities edema. No cyanosis or clubbing.    Current Inpatient Medications:   midodrine   3b chronic kidney disease (HCC)    TIMOTEO (generalized anxiety disorder)    Congestive heart failure with unknown left ventricular ejection fraction (HCC)    Acute respiratory distress    Chronic anticoagulation    Mild malnutrition (HCC)    Acute cystitis without hematuria    Benign essential hypertension    Chronic respiratory failure with hypoxia    Dyspnea on exertion    HTN (hypertension)    Atrial fibrillation (HCC)    Acute on chronic diastolic heart failure (HCC)    Legally blind    Severe malnutrition (HCC)       PLAN:    Medications reviewed  Continue current medications  Heart rate remained in good control with the current regimen however blood pressure is low despite midodrine  Will add pressure stockings  Place parameters on the medications  Hold Lasix for today      Please see orders.  Discussed with patient and nursing.    Lora Frey MD, MD

## 2024-11-29 NOTE — PROGRESS NOTES
Occupational Therapy  Facility/Department: [facility] PROGRESSIVE CARE  Rehabilitation Occupational Therapy Daily Treatment Note    Date: 24  Patient Name: Natasha Walls       Room: 1010/1010-02  MRN: [id number]  Account: [id number]   : 1935  (89 y.o.) Gender: female   Past Medical History:  has a past medical history of A-fib (HCC), Atrial fibrillation (HCC), Benign essential hypertension, CHF (congestive heart failure) (HCC), COPD (chronic obstructive pulmonary disease) (HCC), Diverticulosis, TIMOTEO (generalized anxiety disorder), H/O blood clots, History of cardioversion, Liver cyst, Macular degeneration, Pneumonia, Rectocele, and Stage 3b chronic kidney disease (HCC).  Past Surgical History:   has a past surgical history that includes ablation of dysrhythmic focus; Pacemaker insertion; Femur Surgery (Right); Appendectomy; Hysterectomy; and Cataract removal (Bilateral).    Restrictions  Restrictions/Precautions: General Precautions, Fall Risk  Implants present? : Pacemaker  Other position/activity restrictions: Up with assist, telemetry, has Virtual , Ext urinary catheter, R UE IV, ALARMS, pt is very impulsive, agitates easily  Required Braces or Orthoses?: No    Subjective  Subjective: \"Will you call my son?\"  Restrictions/Precautions: General Precautions;Fall Risk  Objective     Cognition  Overall Cognitive Status: Exceptions  Arousal/Alertness: Delayed responses to stimuli  Following Commands: Inconsistently follows commands;Follows one step commands with increased time;Follows one step commands with repetition  Attention Span: Attends with cues to redirect;Difficulty attending to directions;Difficulty dividing attention  Safety Judgement: Decreased awareness of need for assistance;Decreased awareness of need for safety  Problem Solving: Assistance required to implement solutions;Decreased awareness of errors;Assistance required to correct errors made;Assistance required to generate  scooting bottom over to EOB  Transfers  Surface: From bed;To bed;Standard toilet  Additional Factors: Set-up;Verbal cues;Hand placement cues;Increased time to complete  Device: Walker  Sit to Stand  Assistance Level: Minimal assistance;Moderate assistance  Skilled Clinical Factors: with max verbal cues to push up from sitting surface.  Stand to Sit  Assistance Level: Minimal assistance;Moderate assistance  Skilled Clinical Factors: with max verbal cues to reach back for sitting surface and control sit.         Assessment  Assessment  Assessment: Pt. completed multiple ADL transfers with min/mod assist x2 or safety d/t impulsiveness and poor safety judgement. Pt. confused and easily distracted off of task at hand. Skilled OT warranted to promote I/safety to return pt to prior living arrangement with assist as needed.  Activity Tolerance: Patient limited by fatigue;Patient limited by endurance;Treatment limited secondary to decreased cognition  Discharge Recommendations: Patient would benefit from continued therapy after discharge  OT Equipment Recommendations  Equipment Needed: Yes  Mobility Devices: Walker  Walker: Rolling  Safety Devices  Safety Devices in place: Yes  Type of devices: All fall risk precautions in place;Call light within reach;Nurse notified;Left in bed;Bed alarm in place  Restraints  Initially in place: No    Patient Education  Education  Education Given To: Patient  Education Provided: Role of Therapy;Safety;Transfer Training;Mobility Training;Equipment;ADL Function  Education Provided Comments: Pt. educated on safety awareness with controling RW and proper use of bedrails and grab bars in rest room. Pt. attentive to education but requiring constant cues to remain focused to complete task.  Education Method: Demonstration;Verbal  Barriers to Learning: Cognition  Education Outcome: Verbalized understanding;Continued education needed    Plan  Occupational Therapy Plan  Times Per Week: 5x/week 1x/day

## 2024-11-29 NOTE — PROGRESS NOTES
Physical Therapy  Facility/Department: [facility] PROGRESSIVE CARE  Daily Treatment Note  NAME: Natasha Walls  : 1935  MRN: [id number]    Date of Service: 2024    Discharge Recommendations:  Patient would benefit from continued therapy after discharge, 24 hour supervision or assist, Therapy recommended at discharge   PT Equipment Recommendations  Equipment Needed: No    Patient Diagnosis(es): The primary encounter diagnosis was Hypoxia. Diagnoses of Acute cystitis without hematuria, Atrial fibrillation with RVR (HCC), Acute on chronic right-sided congestive heart failure (HCC), and Constipation, unspecified constipation type were also pertinent to this visit.    Assessment  Assessment: Patient limited by confused, poor safety awareness and fatigued with activity. Able to increased ambulation this session d/t the difficulties to keep patient on task and following simple directions requried 2 assist to manage RW, patient's balance, SpO2 monitor and O2 tank/line. Patient impuslive and will required 24 hour supervision. Patient is a HIGH FALL RISK and would benefit from continued skilled PT services.  Activity Tolerance: Patient limited by fatigue;Patient limited by endurance;Treatment limited secondary to decreased cognition  Equipment Needed: No    Plan  Physical Therapy Plan  General Plan: 5-7 times per week  Current Treatment Recommendations: Strengthening;Balance training;Functional mobility training;Transfer training;ADL/Self-care training;Endurance training;Gait training;Home exercise program;Safety education & training;Patient/Caregiver education & training;Positioning;Therapeutic activities    Restrictions  Restrictions/Precautions  Restrictions/Precautions: General Precautions, Fall Risk  Required Braces or Orthoses?: No  Implants present? : Pacemaker  Position Activity Restriction  Other position/activity restrictions: Up with assist, telemetry, has Virtual , Ext urinary catheter, R UE  Ed  Patient Goals   Patient Goals : Go Home TODAY    Education  Patient Education  Education Given To: Patient  Education Provided: Role of Therapy;Energy Conservation;Transfer Training;Home Exercise Program  Education Provided Comments: Pt educated on purpose of PT eval, importance of continued mobility throughout admission, safety awareness, fall risk prevention, safe transfers & ambulation w/ RW, circulation ex's, pressure relief, proper pacing and breathing techniques, prevention of sedentary complications, and PT POC.  Pt demonstrated POOR carryover  Pt requires CONTINUED reinforcement of education.  Education Method: Verbal  Barriers to Learning: Cognition;Vision  Education Outcome: Continued education needed    AM-PAC - Mobility    AM-PAC Basic Mobility - Inpatient   How much help is needed turning from your back to your side while in a flat bed without using bedrails?: None  How much help is needed moving from lying on your back to sitting on the side of a flat bed without using bedrails?: A Little  How much help is needed moving to and from a bed to a chair?: A Little  How much help is needed standing up from a chair using your arms?: A Little  How much help is needed walking in hospital room?: A Lot  How much help is needed climbing 3-5 steps with a railing?: A Lot  AM-PAC Inpatient Mobility Raw Score : 17  AM-PAC Inpatient T-Scale Score : 42.13  Mobility Inpatient CMS 0-100% Score: 50.57  Mobility Inpatient CMS G-Code Modifier : CK         Therapy Time   Individual Concurrent Group Co-treatment   Time In      1050   Time Out       1116   Minutes       26       Co-treatment with OT warranted secondary to decreased safety and independence requiring 2 skilled therapy professionals to address individual discipline's goals. PT addressing pre gait trunk strengthening, weight shifting prior to transfers, transfer training, and postural control in sitting/standing, gait with RW.     Reba Smith, PTA

## 2024-11-29 NOTE — PROGRESS NOTES
[facility]  Office: [phone]  Brian Smith DO, Bertin Rowan DO, Marko Lombardo DO, Luan Quiñonez DO, Berenice Gavin MD, Elissa Ferguson MD, Erin Claudio MD, Lilia Caceres MD,  Kosta Nunn MD, Geremias Balderrama MD, Jayson Hernandez MD,  Ting Vaz DO, Angie Nickerson MD, Nolan Strickland MD, Aravind Smith DO, Lore Ibarra MD,  Jordin Krishna DO, No García MD, Beti Zhu MD, Hawa Baker MD, Danitza Hyman MD,  David Montgomery MD, Gurmeet Ellis MD, Brigida Waggoner MD, Ruby Burrell MD, Eliezer Jasso MD, Trina Urbina MD, Leonardo Jordan DO, Shan Keller MD, Shirley Waterhouse, CNP,  Roxanna Liang CNP, Leonardo Patel, CNP,  Michaelle Veloz, HOSEA, Mercedes Mederos, CNP, Janie Coronel, CNP, Priscila Boateng, CNP, Loulou Rubio, CNP, Kim Andrews PA-C, Tracy Leong PA-C, Annmarie Cramer, CNP, Vladislav Zabala, CNP,  Carlotta Mcdonough, CNP, Shonna Gonzalez, CNP,  Myriam Agrawal, CNP, Stephanie Hilario, CNP       [facility]      Daily Progress Note     Admit Date: 11/26/2024  Bed/Room No.  1010/1010-02  Admitting Physician : Erin Claudio MD  Code Status :DNR-CCA  Hospital Day:  LOS: 3 days   Chief Complaint:     Chief Complaint   Patient presents with    Cough     Principal Problem:    Acute on chronic diastolic congestive heart failure (HCC)  Active Problems:    Acute cystitis without hematuria    COPD without exacerbation (HCC)    Stage 3b chronic kidney disease (HCC)    Acute respiratory distress    Chronic anticoagulation    Benign essential hypertension    Chronic respiratory failure with hypoxia    HTN (hypertension)    Atrial fibrillation (HCC)    Acute on chronic diastolic heart failure (HCC)    Legally blind    Severe malnutrition (HCC)  Resolved Problems:    * No resolved hospital problems. *    Subjective :        Interval History/Significant events :  11/29/24    Patient continues to have hypotension.  Diuretic, Lopressor was held this morning due to

## 2024-11-29 NOTE — CARE COORDINATION
Transitional planning:  Spoke with patient re: therapy's recommendation for SNF.  Patient declines.  Discussed HHC.  Patient asking I speak with son.  Call to son Bill.  Discussed therapy recommendations. Declines SNF.  After much discussion re: mother's needs, he is agreeable to HHC with the exception of [facility] as he was not pleased with their care.  Offered freedom of choice, he does not have a preference. Referrals to [facility] & [facility].    15:50  Call from Netta at [facility].  They can accept but will not be able to do SOC until Monday.

## 2024-11-29 NOTE — PROGRESS NOTES
Comprehensive Nutrition Assessment    Type and Reason for Visit:  Reassess    Nutrition Recommendations/Plan:   Continue current diet and ONS.   Encourage good po intakes  RD to follow and monitor weights, labs, POC, intakes.      Malnutrition Assessment:  Malnutrition Status:  Severe malnutrition (11/27/24 1442)      Nutrition Assessment:    Patient continues on regular diet with no added salt, 1500 ml fluid restriction with ONS BID. She is confused and unsure if she drank her supplement. Breakfast was in room, not given to patient, nursing aware and she was preparing tray for her to eat and drink supplement. Patient says she is \"not eating too well\". Weight down 2 more lbs, currently 78#. She has been having some nausea, given zofran. Labs, meds, PMH reviewed.    Nutrition Related Findings:    No edema, skin intact, LBM 11/26 Wound Type: None       Current Nutrition Intake & Therapies:    Average Meal Intake: 26-50%  Average Supplements Intake: None Ordered  ADULT DIET; Regular; No Added Salt (3-4 gm); 1500 ml  ADULT ORAL NUTRITION SUPPLEMENT; Breakfast, Dinner; Standard High Calorie/High Protein Oral Supplement    Anthropometric Measures:  Height: 149.9 cm (4' 11\")  Ideal Body Weight (IBW): 95 lbs (43 kg)       Current Body Weight: 35.4 kg (78 lb), 84.2 % IBW. Weight Source: Bed scale  Current BMI (kg/m2): 15.7  Usual Body Weight: 44 kg (97 lb) (7/21/24)     % Weight Change (Calculated): -17.5                    BMI Categories: Underweight (BMI less than 22) age over 65    Estimated Daily Nutrient Needs:  Energy Requirements Based On: Kcal/kg  Weight Used for Energy Requirements: Current  Energy (kcal/day): 3011-4476 kcal (32-35 kcal/kg)  Weight Used for Protein Requirements: Current  Protein (g/day): 47-55 gm of protein (1.3-1.5 gm/kg)  Method Used for Fluid Requirements: Defer to provider  Fluid (ml/day): 1500 mL per diet order    Nutrition Diagnosis:   Severe malnutrition related to inadequate protein-energy  intake as evidenced by intake 0-25%, weight loss, muscle loss, loss of subcutaneous fat    Nutrition Interventions:   Food and/or Nutrient Delivery: Continue Current Diet, Start Oral Nutrition Supplement  Nutrition Education/Counseling: Education/Counseling not indicated  Coordination of Nutrition Care: Continue to monitor while inpatient       Goals:  Goals: PO intake 50% or greater  Type of Goal: New goal  Previous Goal Met: No Progress toward Goal(s)    Nutrition Monitoring and Evaluation:      Food/Nutrient Intake Outcomes: Diet Advancement/Tolerance  Physical Signs/Symptoms Outcomes: Biochemical Data, Fluid Status or Edema, Skin, Weight, GI Status    Discharge Planning:    Continue current diet, Continue Oral Nutrition Supplement     Elizabeth Whittington RD  Contact: [phone]

## 2024-11-30 VITALS
BODY MASS INDEX: 14.72 KG/M2 | TEMPERATURE: 97.2 F | HEIGHT: 59 IN | DIASTOLIC BLOOD PRESSURE: 50 MMHG | WEIGHT: 73 LBS | RESPIRATION RATE: 20 BRPM | OXYGEN SATURATION: 95 % | HEART RATE: 94 BPM | SYSTOLIC BLOOD PRESSURE: 101 MMHG

## 2024-11-30 PROCEDURE — 6370000000 HC RX 637 (ALT 250 FOR IP): Performed by: NURSE PRACTITIONER

## 2024-11-30 PROCEDURE — 6360000002 HC RX W HCPCS

## 2024-11-30 PROCEDURE — 6360000002 HC RX W HCPCS: Performed by: FAMILY MEDICINE

## 2024-11-30 PROCEDURE — 94640 AIRWAY INHALATION TREATMENT: CPT

## 2024-11-30 PROCEDURE — 2700000000 HC OXYGEN THERAPY PER DAY

## 2024-11-30 PROCEDURE — 6370000000 HC RX 637 (ALT 250 FOR IP): Performed by: FAMILY MEDICINE

## 2024-11-30 PROCEDURE — 94761 N-INVAS EAR/PLS OXIMETRY MLT: CPT

## 2024-11-30 PROCEDURE — 99239 HOSP IP/OBS DSCHRG MGMT >30: CPT | Performed by: FAMILY MEDICINE

## 2024-11-30 PROCEDURE — 2580000003 HC RX 258: Performed by: NURSE PRACTITIONER

## 2024-11-30 RX ORDER — POLYETHYLENE GLYCOL 3350 17 G/17G
17 POWDER, FOR SOLUTION ORAL DAILY PRN
Qty: 30 EACH | Refills: 0 | Status: SHIPPED | OUTPATIENT
Start: 2024-11-30 | End: 2024-12-30

## 2024-11-30 RX ORDER — METOPROLOL SUCCINATE 25 MG/1
25 TABLET, EXTENDED RELEASE ORAL DAILY
Qty: 30 TABLET | Refills: 3 | Status: SHIPPED | OUTPATIENT
Start: 2024-11-30

## 2024-11-30 RX ORDER — LEVOFLOXACIN 250 MG/1
250 TABLET, FILM COATED ORAL DAILY
Qty: 2 TABLET | Refills: 0 | Status: SHIPPED | OUTPATIENT
Start: 2024-11-30 | End: 2024-11-30

## 2024-11-30 RX ORDER — FUROSEMIDE 20 MG/1
20 TABLET ORAL DAILY
Qty: 60 TABLET | Refills: 3 | Status: SHIPPED | OUTPATIENT
Start: 2024-11-30 | End: 2024-11-30

## 2024-11-30 RX ORDER — PSEUDOEPHEDRINE HCL 30 MG
100 TABLET ORAL 2 TIMES DAILY
Qty: 30 CAPSULE | Refills: 0 | Status: SHIPPED | OUTPATIENT
Start: 2024-11-30

## 2024-11-30 RX ORDER — PSEUDOEPHEDRINE HCL 30 MG
100 TABLET ORAL 2 TIMES DAILY
Qty: 30 CAPSULE | Refills: 0 | Status: SHIPPED | OUTPATIENT
Start: 2024-11-30 | End: 2024-11-30

## 2024-11-30 RX ORDER — MIDODRINE HYDROCHLORIDE 10 MG/1
10 TABLET ORAL
Qty: 90 TABLET | Refills: 1 | Status: SHIPPED | OUTPATIENT
Start: 2024-11-30

## 2024-11-30 RX ORDER — FUROSEMIDE 20 MG/1
20 TABLET ORAL DAILY
Qty: 60 TABLET | Refills: 3 | Status: SHIPPED | OUTPATIENT
Start: 2024-11-30

## 2024-11-30 RX ORDER — DILTIAZEM HYDROCHLORIDE 120 MG/1
120 CAPSULE, COATED, EXTENDED RELEASE ORAL DAILY
Qty: 30 CAPSULE | Refills: 3 | Status: SHIPPED | OUTPATIENT
Start: 2024-11-30 | End: 2024-11-30 | Stop reason: HOSPADM

## 2024-11-30 RX ORDER — DIGOXIN 0.06 MG/1
62.5 TABLET ORAL DAILY
Qty: 30 TABLET | Refills: 3 | Status: SHIPPED | OUTPATIENT
Start: 2024-11-30

## 2024-11-30 RX ORDER — DIGOXIN 0.06 MG/1
64 TABLET ORAL DAILY
Qty: 30 TABLET | Refills: 3 | Status: SHIPPED | OUTPATIENT
Start: 2024-11-30 | End: 2024-11-30

## 2024-11-30 RX ORDER — LEVOFLOXACIN 250 MG/1
250 TABLET, FILM COATED ORAL DAILY
Qty: 2 TABLET | Refills: 0 | Status: SHIPPED | OUTPATIENT
Start: 2024-11-30 | End: 2024-12-02

## 2024-11-30 RX ORDER — METOPROLOL SUCCINATE 25 MG/1
25 TABLET, EXTENDED RELEASE ORAL DAILY
Qty: 30 TABLET | Refills: 3 | Status: SHIPPED | OUTPATIENT
Start: 2024-11-30 | End: 2024-11-30

## 2024-11-30 RX ORDER — GUAIFENESIN 600 MG/1
600 TABLET, EXTENDED RELEASE ORAL 2 TIMES DAILY
Qty: 30 TABLET | Refills: 0 | Status: SHIPPED | OUTPATIENT
Start: 2024-11-30 | End: 2024-11-30

## 2024-11-30 RX ORDER — GUAIFENESIN 600 MG/1
600 TABLET, EXTENDED RELEASE ORAL 2 TIMES DAILY
Qty: 30 TABLET | Refills: 0 | Status: SHIPPED | OUTPATIENT
Start: 2024-11-30

## 2024-11-30 RX ORDER — MIDODRINE HYDROCHLORIDE 10 MG/1
10 TABLET ORAL
Qty: 90 TABLET | Refills: 1 | Status: SHIPPED | OUTPATIENT
Start: 2024-11-30 | End: 2024-11-30

## 2024-11-30 RX ADMIN — DIGOXIN 62.5 MCG: 125 TABLET ORAL at 11:42

## 2024-11-30 RX ADMIN — BUDESONIDE AND FORMOTEROL FUMARATE DIHYDRATE 2 PUFF: 160; 4.5 AEROSOL RESPIRATORY (INHALATION) at 08:19

## 2024-11-30 RX ADMIN — POLYETHYLENE GLYCOL 3350 17 G: 17 POWDER, FOR SOLUTION ORAL at 11:40

## 2024-11-30 RX ADMIN — METOPROLOL SUCCINATE 25 MG: 25 TABLET, EXTENDED RELEASE ORAL at 11:40

## 2024-11-30 RX ADMIN — TIOTROPIUM BROMIDE INHALATION SPRAY 2 PUFF: 3.12 SPRAY, METERED RESPIRATORY (INHALATION) at 08:19

## 2024-11-30 RX ADMIN — FUROSEMIDE 20 MG: 20 TABLET ORAL at 11:40

## 2024-11-30 RX ADMIN — ALBUTEROL SULFATE 2.5 MG: 2.5 SOLUTION RESPIRATORY (INHALATION) at 08:18

## 2024-11-30 RX ADMIN — TRAMADOL HYDROCHLORIDE 50 MG: 50 TABLET, COATED ORAL at 11:41

## 2024-11-30 RX ADMIN — DIPHENHYDRAMINE HYDROCHLORIDE 25 MG: 50 INJECTION INTRAMUSCULAR; INTRAVENOUS at 04:03

## 2024-11-30 RX ADMIN — GUAIFENESIN 600 MG: 600 TABLET ORAL at 11:41

## 2024-11-30 RX ADMIN — SODIUM CHLORIDE, PRESERVATIVE FREE 10 ML: 5 INJECTION INTRAVENOUS at 11:42

## 2024-11-30 RX ADMIN — DOCUSATE SODIUM 100 MG: 100 CAPSULE, LIQUID FILLED ORAL at 11:41

## 2024-11-30 RX ADMIN — APIXABAN 2.5 MG: 2.5 TABLET, FILM COATED ORAL at 11:41

## 2024-11-30 RX ADMIN — MIDODRINE HYDROCHLORIDE 10 MG: 10 TABLET ORAL at 11:40

## 2024-11-30 RX ADMIN — ONDANSETRON 4 MG: 4 TABLET, ORALLY DISINTEGRATING ORAL at 11:55

## 2024-11-30 ASSESSMENT — PAIN DESCRIPTION - ONSET: ONSET: GRADUAL

## 2024-11-30 ASSESSMENT — ENCOUNTER SYMPTOMS
COUGH: 0
RHINORRHEA: 0
CONSTIPATION: 0
WHEEZING: 0
DIARRHEA: 0
CHEST TIGHTNESS: 0
BLOOD IN STOOL: 0
NAUSEA: 0
VOMITING: 0
ABDOMINAL PAIN: 0
SHORTNESS OF BREATH: 1

## 2024-11-30 ASSESSMENT — PAIN - FUNCTIONAL ASSESSMENT: PAIN_FUNCTIONAL_ASSESSMENT: ACTIVITIES ARE NOT PREVENTED

## 2024-11-30 ASSESSMENT — PAIN DESCRIPTION - ORIENTATION: ORIENTATION: MID

## 2024-11-30 ASSESSMENT — PAIN DESCRIPTION - FREQUENCY: FREQUENCY: INTERMITTENT

## 2024-11-30 ASSESSMENT — PAIN SCALES - GENERAL
PAINLEVEL_OUTOF10: 3
PAINLEVEL_OUTOF10: 0

## 2024-11-30 ASSESSMENT — PAIN DESCRIPTION - PAIN TYPE: TYPE: ACUTE PAIN

## 2024-11-30 ASSESSMENT — PAIN DESCRIPTION - LOCATION: LOCATION: ABDOMEN

## 2024-11-30 ASSESSMENT — PAIN DESCRIPTION - DESCRIPTORS: DESCRIPTORS: ACHING

## 2024-11-30 NOTE — DISCHARGE SUMMARY
[Z79.01]     Acute respiratory distress [R06.03]     Stage 3b chronic kidney disease (HCC) [N18.32] 10/04/2021    COPD without exacerbation (HCC) [J44.9]     Acute on chronic diastolic congestive heart failure (HCC) [I50.33]     Benign essential hypertension [I10] 05/15/2016       Admission Condition:  fair     Discharged Condition: stable.  High risk of readmission.  Poor prognosis overall.  CODE STATUS DNR CCA    Hospital Stay:     Hospital Course:  Natasha Walls is a 89 y.o. female who was admitted for the management of   Acute on chronic diastolic congestive heart failure (HCC) , presented to ER with Cough  Patient presented to freestanding emergency room at [facility] with dysuria, cough, breathing difficulty.  Patient was noted to have A-fib RVR.  She has history of permanent atrial fibrillation.  Patient has been having frequent hospitalizations since summer of this year.  Patient had right inguinal hernia repair on 6/21/2024 at [facility].  She was later admitted again in August 2024 after having a fall at home, altered mental status and was noted to have A-fib RVR, septic shock , perirectal abscess which required I&D.  Patient was discharged to [facility] on 8/29/2024.  Patient was in [facility] for more than a month for physical therapy.  Patient had a living with daughter since.  She did get admitted earlier this month for 30 days at [facility] facility for decompensated CHF.  Patient qualified for 2 L of oxygen on discharge.     She has chronic debility, legally blind due to macular degeneration.  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     Patient was on room air at presentation.  She had A-fib with RVR.  Blood pressure was soft.  She

## 2024-11-30 NOTE — RT PROTOCOL NOTE
RT Inhaler-Nebulizer Bronchodilator Protocol Note    There is a bronchodilator order in the chart from a provider indicating to follow the RT Bronchodilator Protocol and there is an “Initiate RT Inhaler-Nebulizer Bronchodilator Protocol” order as well (see protocol at bottom of note).    CXR Findings:  XR CHEST PORTABLE    Result Date: 11/29/2024  Left basilar airspace disease and trace left pleural effusion.  This could represent atelectasis and/or pneumonia       The findings from the last RT Protocol Assessment were as follows:   History Pulmonary Disease: Chronic pulmonary disease  Respiratory Pattern: Regular pattern and RR 12-20 bpm  Breath Sounds: Slightly diminished and/or crackles  Cough: Strong, spontaneous, non-productive  Indication for Bronchodilator Therapy: Decreased or absent breath sounds, On home bronchodilators  Bronchodilator Assessment Score: 4    Aerosolized bronchodilator medication orders have been revised according to the RT Inhaler-Nebulizer Bronchodilator Protocol below.    Respiratory Therapist to perform RT Therapy Protocol Assessment initially then follow the protocol.  Repeat RT Therapy Protocol Assessment PRN for score 0-3 or on second treatment, BID, and PRN for scores above 3.    No Indications - adjust the frequency to every 6 hours PRN wheezing or bronchospasm, if no treatments needed after 48 hours then discontinue using Per Protocol order mode.     If indication present, adjust the RT bronchodilator orders based on the Bronchodilator Assessment Score as indicated below.  Use Inhaler orders unless patient has one or more of the following: on home nebulizer, not able to hold breath for 10 seconds, is not alert and oriented, cannot activate and use MDI correctly, or respiratory rate 25 breaths per minute or more, then use the equivalent nebulizer order(s) with same Frequency and PRN reasons based on the score.  If a patient is on this medication at home then do not decrease

## 2024-11-30 NOTE — PLAN OF CARE
Problem: Chronic Conditions and Co-morbidities  Goal: Patient's chronic conditions and co-morbidity symptoms are monitored and maintained or improved  11/26/2024 2302 by Benny Henriquez RN  Outcome: Progressing  11/26/2024 1935 by Reyes, Brittnie, RN  Outcome: Progressing     Problem: Discharge Planning  Goal: Discharge to home or other facility with appropriate resources  11/26/2024 2302 by Benny Henriquez RN  Outcome: Progressing  Note: Discharge teaching and instructions for diagnosis/procedure explained with patient using teachback method.  Patient voiced understanding regarding prescriptions, follow up appointments, and care of self at home.   11/26/2024 1935 by Reyes, Brittnie, RN  Outcome: Progressing     Problem: Safety - Adult  Goal: Free from fall injury  11/26/2024 2302 by Benny Henriquez RN  Outcome: Progressing  Note: Pt fall risk, fall band present, falling star, safety alarm activated and in use as needed. Hourly rounding performed. Pt encouraged to use call light. See Monae fall risk assessment.  11/26/2024 1935 by Reyes, Brittnie, RN  Outcome: Progressing     Problem: ABCDS Injury Assessment  Goal: Absence of physical injury  11/26/2024 2302 by Benny Henriquez RN  Outcome: Progressing  Note: Non-skid socks in place, up with assistance, bed in lowest position, bed exit & alarm as needed, provide toileting every 2 hours an d as needed.  11/26/2024 1935 by Reyes, Brittnie, RN  Outcome: Progressing     
  Problem: Chronic Conditions and Co-morbidities  Goal: Patient's chronic conditions and co-morbidity symptoms are monitored and maintained or improved  11/28/2024 0039 by Benny Henriquez RN  Outcome: Progressing  11/27/2024 1358 by Shonna Kothari RN  Outcome: Progressing     Problem: Discharge Planning  Goal: Discharge to home or other facility with appropriate resources  11/28/2024 0039 by Benny Henriquez RN  Outcome: Progressing  Note: Discharge teaching and instructions for diagnosis/procedure explained with patient using teachback method.  Patient voiced understanding regarding prescriptions, follow up appointments, and care of self at home.   11/27/2024 1358 by Shonna Kothari RN  Outcome: Progressing     Problem: Safety - Adult  Goal: Free from fall injury  11/28/2024 0039 by Benny Henriquez RN  Outcome: Progressing  Note: Pt fall risk, fall band present, falling star, safety alarm activated and in use as needed. Hourly rounding performed. Pt encouraged to use call light. See Letha fall risk assessment.  11/27/2024 1358 by Shonna Kothari RN  Outcome: Progressing     Problem: ABCDS Injury Assessment  Goal: Absence of physical injury  11/28/2024 0039 by Benny Henriquez RN  Outcome: Progressing  Note: Non-skid socks in place, up with assistance, bed in lowest position, bed exit & alarm as needed, provide toileting every 2 hours an d as needed.  11/27/2024 1358 by Shonna Kothari RN  Outcome: Progressing     Problem: Respiratory - Adult  Goal: Achieves optimal ventilation and oxygenation  11/28/2024 0039 by Benny Henriquez RN  Outcome: Progressing  Note: Assess breath sounds every shift and as needed.  Assess oxygenation level & respiration rate.  Encourage coughing & deep breathing.  Encourage use of incentive spirometer.  Assess cough & sputum.  Administer oxygen as needed.  11/27/2024 2048 by Rashid Sarah RCP  Outcome: Progressing  11/27/2024 1358 by Shonna Kothari 
  Problem: Chronic Conditions and Co-morbidities  Goal: Patient's chronic conditions and co-morbidity symptoms are monitored and maintained or improved  11/28/2024 1155 by Rebecca Benites, RN  Outcome: Progressing    Problem: Safety - Adult  Goal: Free from fall injury  11/28/2024 1155 by Rebecca Benites, RN  Outcome: Progressing  Pt fall risk, fall band present, falling star, safety alarm activated and in use as needed. Hourly rounding performed. Pt encouraged to use call light. See Monae fall risk assessment.   Problem: Respiratory - Adult  Goal: Achieves optimal ventilation and oxygenation  11/28/2024 1155 by Rebecca Benites, RN  Outcome: Progressing    Problem: Skin/Tissue Integrity - Adult  Goal: Skin integrity remains intact  11/28/2024 1155 by Rebecca Benites, RN  Outcome: Progressing    
  Problem: Respiratory - Adult  Goal: Achieves optimal ventilation and oxygenation  11/27/2024 2048 by Rashid Sarah, RCFADUMO  Outcome: Progressing     
  Problem: Respiratory - Adult  Goal: Achieves optimal ventilation and oxygenation  11/28/2024 0840 by Johanny Garcia RCP  Outcome: Progressing     
  Problem: Respiratory - Adult  Goal: Achieves optimal ventilation and oxygenation  11/29/2024 1315 by Nelia Resendiz RCP  Outcome: Progressing  11/29/2024 1125 by Shonna Kothari RN  Outcome: Progressing  11/29/2024 0429 by Eileen Harrison RN  Outcome: Progressing     
  Problem: Respiratory - Adult  Goal: Achieves optimal ventilation and oxygenation  11/29/2024 2049 by Rashid Sarah, MIGUELITO  Outcome: Progressing     
  Problem: Respiratory - Adult  Goal: Achieves optimal ventilation and oxygenation  11/30/2024 0822 by Johanny Sullivan RCP  Outcome: Progressing  11/30/2024 0457 by Eileen Harrison RN  Outcome: Progressing  11/29/2024 2049 by Rashid Sarah RCP  Outcome: Progressing     
D/c plan likely Home tomorrow with HC, pt refused SNF. Pt remains A&O x 4, on RA. No new s/s of skin breakdown or injury. Safety protocols in place and enforced. Pt ambulates SB 1x with walker. Pt has no c/o pain. Pt utilized RR a few times today, missed the hat for accurate output.     Problem: Chronic Conditions and Co-morbidities  Goal: Patient's chronic conditions and co-morbidity symptoms are monitored and maintained or improved  Outcome: Progressing     Problem: Discharge Planning  Goal: Discharge to home or other facility with appropriate resources  Outcome: Progressing     Problem: Safety - Adult  Goal: Free from fall injury  Outcome: Progressing     Problem: ABCDS Injury Assessment  Goal: Absence of physical injury  Outcome: Progressing     Problem: Respiratory - Adult  Goal: Achieves optimal ventilation and oxygenation  Reactivated     Problem: Skin/Tissue Integrity - Adult  Goal: Skin integrity remains intact  Reactivated  Goal: Incisions, wounds, or drain sites healing without S/S of infection  Reactivated     Problem: Genitourinary - Adult  Goal: Absence of urinary retention  Reactivated     
Patient A&O x4 with times of confusion. Ambulating stand by assist with walker  Patient using call light often  Remains on 3L NC  Requiring valium and Ambien to sleep, this is her home regiment.   Also requiring PRN benadryl for anxiety.  Patient was unable to take lasix yesterday morning d/t hypotension, Was able to take it in the PM instead when blood pressure stable, per on call NP.   Patient refused morning labs, will try again later  Tele-sitter remains in place for safety.       Problem: Chronic Conditions and Co-morbidities  Goal: Patient's chronic conditions and co-morbidity symptoms are monitored and maintained or improved  Outcome: Progressing     Problem: Discharge Planning  Goal: Discharge to home or other facility with appropriate resources  Outcome: Progressing     Problem: Safety - Adult  Goal: Free from fall injury  Outcome: Progressing     Problem: ABCDS Injury Assessment  Goal: Absence of physical injury  Outcome: Progressing     Problem: Respiratory - Adult  Goal: Achieves optimal ventilation and oxygenation  11/30/2024 0457 by Eileen Harrison RN  Outcome: Progressing     Problem: Skin/Tissue Integrity - Adult  Goal: Skin integrity remains intact  Outcome: Progressing  Flowsheets (Taken 11/29/2024 2302)  Skin Integrity Remains Intact: Monitor for areas of redness and/or skin breakdown     Problem: Skin/Tissue Integrity - Adult  Goal: Incisions, wounds, or drain sites healing without S/S of infection  Outcome: Progressing     Problem: Genitourinary - Adult  Goal: Absence of urinary retention  Outcome: Progressing     Problem: Nutrition Deficit:  Goal: Optimize nutritional status  Outcome: Progressing     Problem: Skin/Tissue Integrity  Goal: Absence of new skin breakdown  Description: 1.  Monitor for areas of redness and/or skin breakdown  2.  Assess vascular access sites hourly  3.  Every 4-6 hours minimum:  Change oxygen saturation probe site  4.  Every 4-6 hours:  If on nasal continuous 
Patient A&O x4, with times of confusion  Ambulating stand by assist to restroom  Complaints of nausea, Zofran given  Complaints of abdominal and leg pain, tylenol given. Patient having pain on their abdomen and rates it a 6. Pain interventions includefrequent position changes, correct body alignment/body mechanics, relaxation techniques, and medication. Patients goal for pain relief is  0 . The need for pain and symptom management will be considered in the discharge planning process to ensure patients comfort.    Remains on 3L NC  Tele-sitter in place for safety  Blood pressures soft, on call NP notified, no new orders  Safety maintained  Problem: Chronic Conditions and Co-morbidities  Goal: Patient's chronic conditions and co-morbidity symptoms are monitored and maintained or improved  Outcome: Progressing     Problem: Discharge Planning  Goal: Discharge to home or other facility with appropriate resources  Outcome: Progressing     Problem: Safety - Adult  Goal: Free from fall injury  Outcome: Progressing     Problem: ABCDS Injury Assessment  Goal: Absence of physical injury  Outcome: Progressing     Problem: Respiratory - Adult  Goal: Achieves optimal ventilation and oxygenation  Outcome: Progressing     Problem: Skin/Tissue Integrity - Adult  Goal: Skin integrity remains intact  Outcome: Progressing  Flowsheets (Taken 11/28/2024 2122)  Skin Integrity Remains Intact: Monitor for areas of redness and/or skin breakdown     Problem: Skin/Tissue Integrity - Adult  Goal: Incisions, wounds, or drain sites healing without S/S of infection  Outcome: Progressing     Problem: Genitourinary - Adult  Goal: Absence of urinary retention  Outcome: Progressing     Problem: Nutrition Deficit:  Goal: Optimize nutritional status  Outcome: Progressing     Problem: Skin/Tissue Integrity  Goal: Absence of new skin breakdown  Description: 1.  Monitor for areas of redness and/or skin breakdown  2.  Assess vascular access sites hourly  3. 
Patient admitted for CHF. Crackles present bilaterally. Receiving IV lasix. Aox4 but very forgetful and impulsive. Moved closer to nursing station and virtual  placed in room. 1 assist with the walker. VSS, call light within reach, safety maintained        Problem: Chronic Conditions and Co-morbidities  Goal: Patient's chronic conditions and co-morbidity symptoms are monitored and maintained or improved  Outcome: Progressing     Problem: Discharge Planning  Goal: Discharge to home or other facility with appropriate resources  Outcome: Progressing     Problem: Safety - Adult  Goal: Free from fall injury  Outcome: Progressing     Problem: ABCDS Injury Assessment  Goal: Absence of physical injury  Outcome: Progressing     
breakdown  Description: 1.  Monitor for areas of redness and/or skin breakdown  2.  Assess vascular access sites hourly  3.  Every 4-6 hours minimum:  Change oxygen saturation probe site  4.  Every 4-6 hours:  If on nasal continuous positive airway pressure, respiratory therapy assess nares and determine need for appliance change or resting period.  11/29/2024 1125 by Shonna Kothari RN  Outcome: Progressing     Problem: Pain  Goal: Verbalizes/displays adequate comfort level or baseline comfort level  Outcome: Progressing

## 2024-11-30 NOTE — DISCHARGE INSTR - COC
Continuity of Care Form    Patient Name: Natasha Smith   :  1935  MRN:  [id number]    Admit date:  2024  Discharge date:  ***    Code Status Order: DNR-CCA   Advance Directives:   Advance Care Flowsheet Documentation             Admitting Physician:  Erin Claudio MD  PCP: John Bowers MD    Discharging Nurse: ***  Discharging Hospital Unit/Room#: 1010/1010-02  Discharging Unit Phone Number: ***    Emergency Contact:   Extended Emergency Contact Information  Primary Emergency Contact: arley smith  Home Phone: [phone]  Relation: Child  Secondary Emergency Contact: Mark Smith  Home Phone: [phone]  Relation: Child    Past Surgical History:  Past Surgical History:   Procedure Laterality Date    ABLATION OF DYSRHYTHMIC FOCUS      APPENDECTOMY      CATARACT REMOVAL Bilateral     FEMUR SURGERY Right     francois in femur    HYSTERECTOMY (CERVIX STATUS UNKNOWN)      PACEMAKER INSERTION         Immunization History:   Immunization History   Administered Date(s) Administered    COVID-19, MODERNA Bivalent, (age 12y+), IM, 50 mcg/0.5 mL 10/21/2022    COVID-19, PFIZER PURPLE top, DILUTE for use, (age 12 y+), 30mcg/0.3mL 2021, 2021, 2021       Active Problems:  Patient Active Problem List   Diagnosis Code    History of hysterectomy Z90.710    Cardiac resynchronization therapy pacemaker (CRT-P) in place Z95.0    A-fib ([facility]) I48.91    History of cardioversion Z92.89    Rectocele N81.6    Diverticulosis K57.90    H/O blood clots Z86.718    COPD without exacerbation ([facility]) J44.9    Acute on chronic diastolic congestive heart failure ([facility]) I50.33    Fecal impaction ([facility]) K56.41    Constipation K59.00    Stage 3b chronic kidney disease ([facility]) N18.32    TIMOTEO (generalized anxiety disorder) F41.1    Congestive heart failure with unknown left ventricular ejection fraction ([facility]) I50.9    Acute respiratory distress R06.03    Chronic anticoagulation Z79.01    Mild malnutrition ([facility]) E44.1    Acute cystitis  Admission H&P: No change in H&P    PHYSICIAN SIGNATURE:  Electronically signed by Erin Claudio MD on 11/30/24 at 9:45 AM EST

## 2024-11-30 NOTE — CARE COORDINATION
DC Planning    Spoke w Virginia from [facility] pt has been accepted, w DIXIE Monday, Tomi from  [facility] did call and left message re referral-called him back and left message. KUSHAL is signed, nurse portion needs completed.

## 2024-11-30 NOTE — PROGRESS NOTES
hours.    Invalid input(s): \"PROT\", \"[id number]\", \"LDLCHOLESTEROL\"         Protein, UA   Date Value Ref Range Status   11/26/2024 NEGATIVE NEGATIVE mg/dL Final     RBC, UA   Date Value Ref Range Status   11/26/2024 0 TO 2 0 - 2 /HPF Final     Bacteria, UA   Date Value Ref Range Status   11/26/2024 FEW (A) None Final     Nitrite, Urine   Date Value Ref Range Status   11/26/2024 NEGATIVE NEGATIVE Final     WBC, UA   Date Value Ref Range Status   11/26/2024 2 TO 5 0 - 5 /HPF Final     Leukocyte Esterase, Urine   Date Value Ref Range Status   11/26/2024 MODERATE (A) NEGATIVE Final       Imaging / Clinical Data :-   XR CHEST PORTABLE   Final Result   Left basilar airspace disease and trace left pleural effusion.  This could   represent atelectasis and/or pneumonia         CT ABDOMEN PELVIS W IV CONTRAST Additional Contrast? Radiologist Recommendation   Final Result   1.  Small left pleural effusion with mild interstitial edema in the lung   bases bilaterally.      2.  Colonic diverticulosis without evidence of acute diverticulitis.      3. Other chronic findings as above         XR CHEST PORTABLE   Final Result   1. Cardiomegaly and vascular congestion without evidence of interstitial   edema.   2. Bibasilar opacities are predominately linear.  Atelectasis is favored over   pneumonia.   3. Large lung volumes and flattening of the diaphragm suggesting COPD.              Clinical Course : stable  Assessment and Plan  :        Acute on chronic HFpEF  Low-dose Lasix.  Fluid restriction, low-salt diet.  Diuresis limited by hypotension.  Patient on ProAmatine dose to 10 mg 3 times daily..  Persistent atrial fibrillation with RVR. -Rate control limited by hypotension.  Started on digoxin.  Cardizem discontinued..  On midodrine for hypotension.  Left lower lobe pneumonia-Levaquin to finish the course.  Continue Mucinex, pulmonary toilet.  Increase incentive spirometry.  COPD emphysema.  Bronchodilators./   Chronic respiratory  failure on home oxygen.  Started 2 weeks ago from recent hospitalization at 2 LPM.  Continue O2 supplement to keep SpO2 > 90%.  Chronic pain syndrome -tramadol as needed.  Chronic benzodiazepine use -lorazepam as needed.  Pulmonary cachexia and malnutrition  Homebound  Former smoker -quit 4 years ago  UTI-on Levaquin.    Overall prognosis poor.    She wants to go home.  Discussed with patient at bedside.   CODE STATUS DNR CCA.    Discharge home with home Care   Continue to monitor vitals , Intake / output ,  Cell count , HGB , Kidney function, oxygenation  as indicated .   Plan and updates discussed with patient ,  answers  explained to satisfaction.   Plan discussed with Staff Anh PHILIP     (This note is created with the assistance of a speech recognition program. While intending to generate a document that actually reflects the content of the visit, the document can still have some errors including those of syntax and sound a like substitutions which may escape proof reading. In such instances, actual meaning can be extrapolated by contextual diversion.)      Erin Claudio MD  11/30/2024

## 2024-12-01 LAB
MICROORGANISM SPEC CULT: NORMAL
MICROORGANISM SPEC CULT: NORMAL
SERVICE CMNT-IMP: NORMAL
SERVICE CMNT-IMP: NORMAL
SPECIMEN DESCRIPTION: NORMAL
SPECIMEN DESCRIPTION: NORMAL

## 2024-12-01 NOTE — PROGRESS NOTES
Pt picked up by son/personal vehicle to D/C home with [facility]; son said pt could go home without oxygen and did not bring O2 tank, pt son said this is how she usually travels; all belongings collected; discharge paperwork gone over with son and pt at length regarding what med changes there were. No further questions and pt escorted out by W/C to son's vehicle.

## 2024-12-01 NOTE — PROGRESS NOTES
Physician Progress Note      PATIENT:               KELVIN CAMPOS  CSN #:                  [id number]  :                       1935  ADMIT DATE:       2024 11:55 AM  DISCH DATE:        2024 1:00 PM  RESPONDING  PROVIDER #:        Erin Claudio MD          QUERY TEXT:    Patient admitted with A/C diastolic CHF, noted to have persistent atrial   fibrillation and is maintained on Eliquis .If possible, please document in   progress notes and discharge summary if you are evaluating and/or treating any   of the following:?  ?  The medical record reflects the following:  Risk Factors: advanced age 89yo, female, CHF , HTN  Clinical Indicators: H&P: Atrial fibrillation on chronic anticoagulation. On   Cardizem for rate control. Persistent atrial fibrillation with RVR. -Cardizem   240 mg daily,   Rate control limited by hypotension.  Treatment: Continuous cardiac monitoring, Eliquis , cardiology consulted.    Thank You, Tamra CDS  Options provided:  -- Secondary hypercoagulable state in a patient with atrial fibrillation  -- Other - I will add my own diagnosis  -- Disagree - Not applicable / Not valid  -- Disagree - Clinically unable to determine / Unknown  -- Refer to Clinical Documentation Reviewer    PROVIDER RESPONSE TEXT:    This patient has secondary hypercoagulable state in a patient with atrial   fibrillation.    Query created by: Ariella Fierro on 2024 9:07 AM      Electronically signed by:  Erin Claudio MD 2024 2:16 PM